# Patient Record
Sex: MALE | Race: WHITE | Employment: OTHER | URBAN - METROPOLITAN AREA
[De-identification: names, ages, dates, MRNs, and addresses within clinical notes are randomized per-mention and may not be internally consistent; named-entity substitution may affect disease eponyms.]

---

## 2017-03-08 PROBLEM — E89.0 POSTABLATIVE HYPOTHYROIDISM: Status: ACTIVE | Noted: 2017-03-08

## 2017-03-08 PROBLEM — I10 ESSENTIAL HYPERTENSION: Status: ACTIVE | Noted: 2017-03-08

## 2018-01-31 PROBLEM — E66.01 OBESITY, MORBID (HCC): Status: ACTIVE | Noted: 2018-01-31

## 2018-04-10 PROBLEM — E78.2 MIXED HYPERLIPIDEMIA: Status: ACTIVE | Noted: 2018-04-10

## 2018-08-21 PROBLEM — K52.9 GASTROENTERITIS: Status: ACTIVE | Noted: 2018-08-21

## 2019-05-09 PROBLEM — E66.01 OBESITY, MORBID (HCC): Status: RESOLVED | Noted: 2018-01-31 | Resolved: 2019-05-09

## 2019-08-29 PROBLEM — E66.01 SEVERE OBESITY (HCC): Status: ACTIVE | Noted: 2019-08-29

## 2020-01-20 ENCOUNTER — HOSPITAL ENCOUNTER (OUTPATIENT)
Dept: ULTRASOUND IMAGING | Age: 65
Discharge: HOME | End: 2020-01-20
Attending: FAMILY MEDICINE

## 2020-01-20 DIAGNOSIS — R10.9 RIGHT FLANK PAIN: ICD-10-CM

## 2020-02-08 ENCOUNTER — HOSPITAL ENCOUNTER (OUTPATIENT)
Dept: MRI IMAGING | Age: 65
Discharge: HOME | End: 2020-02-08
Attending: FAMILY MEDICINE
Payer: MEDICARE

## 2020-02-08 DIAGNOSIS — K86.9 PANCREATIC LESION: ICD-10-CM

## 2020-02-08 PROCEDURE — A9575 INJ GADOTERATE MEGLUMI 0.1ML: HCPCS | Performed by: FAMILY MEDICINE

## 2020-02-08 PROCEDURE — 74183 MRI ABD W/O CNTR FLWD CNTR: CPT

## 2020-02-08 PROCEDURE — 74011000258 HC RX REV CODE- 258: Performed by: FAMILY MEDICINE

## 2020-02-08 PROCEDURE — 74011250636 HC RX REV CODE- 250/636: Performed by: FAMILY MEDICINE

## 2020-02-08 RX ORDER — GADOTERATE MEGLUMINE 376.9 MG/ML
20 INJECTION INTRAVENOUS
Status: COMPLETED | OUTPATIENT
Start: 2020-02-08 | End: 2020-02-08

## 2020-02-08 RX ORDER — SODIUM CHLORIDE 0.9 % (FLUSH) 0.9 %
10 SYRINGE (ML) INJECTION
Status: COMPLETED | OUTPATIENT
Start: 2020-02-08 | End: 2020-02-08

## 2020-02-08 RX ADMIN — Medication 10 ML: at 09:53

## 2020-02-08 RX ADMIN — GADOTERATE MEGLUMINE 20 ML: 376.9 INJECTION INTRAVENOUS at 09:53

## 2020-02-08 RX ADMIN — SODIUM CHLORIDE 100 ML: 900 INJECTION, SOLUTION INTRAVENOUS at 09:53

## 2020-05-15 PROBLEM — K86.89 PANCREATIC MASS: Status: ACTIVE | Noted: 2020-05-15

## 2020-05-18 RX ORDER — CEFAZOLIN SODIUM/WATER 2 G/20 ML
2 SYRINGE (ML) INTRAVENOUS
Status: CANCELLED | OUTPATIENT
Start: 2020-05-18 | End: 2020-05-18

## 2020-05-27 ENCOUNTER — ANESTHESIA EVENT (OUTPATIENT)
Dept: SURGERY | Age: 65
End: 2020-05-27
Payer: MEDICARE

## 2020-05-28 ENCOUNTER — HOSPITAL ENCOUNTER (OUTPATIENT)
Age: 65
Setting detail: OUTPATIENT SURGERY
Discharge: HOME | End: 2020-05-28
Attending: UROLOGY | Admitting: UROLOGY
Payer: MEDICARE

## 2020-05-28 ENCOUNTER — ANESTHESIA (OUTPATIENT)
Dept: SURGERY | Age: 65
End: 2020-05-28
Payer: MEDICARE

## 2020-05-28 VITALS
DIASTOLIC BLOOD PRESSURE: 66 MMHG | WEIGHT: 276.6 LBS | RESPIRATION RATE: 14 BRPM | HEART RATE: 85 BPM | OXYGEN SATURATION: 99 % | HEIGHT: 70 IN | SYSTOLIC BLOOD PRESSURE: 126 MMHG | BODY MASS INDEX: 39.6 KG/M2 | TEMPERATURE: 97.9 F

## 2020-05-28 DIAGNOSIS — N20.0 LEFT RENAL STONE: Primary | ICD-10-CM

## 2020-05-28 LAB
ANION GAP SERPL CALC-SCNC: 7 MMOL/L (ref 7–16)
BUN SERPL-MCNC: 10 MG/DL (ref 8–23)
CALCIUM SERPL-MCNC: 8.1 MG/DL (ref 8.3–10.4)
CHLORIDE SERPL-SCNC: 109 MMOL/L (ref 98–107)
CO2 SERPL-SCNC: 27 MMOL/L (ref 21–32)
CREAT SERPL-MCNC: 0.77 MG/DL (ref 0.8–1.5)
ERYTHROCYTE [DISTWIDTH] IN BLOOD BY AUTOMATED COUNT: 19 % (ref 11.9–14.6)
GLUCOSE BLD STRIP.AUTO-MCNC: 139 MG/DL (ref 65–100)
GLUCOSE BLD STRIP.AUTO-MCNC: 174 MG/DL (ref 65–100)
GLUCOSE SERPL-MCNC: 165 MG/DL (ref 65–100)
HCT VFR BLD AUTO: 39.1 % (ref 41.1–50.3)
HGB BLD-MCNC: 11.7 G/DL (ref 13.6–17.2)
MCH RBC QN AUTO: 20 PG (ref 26.1–32.9)
MCHC RBC AUTO-ENTMCNC: 29.9 G/DL (ref 31.4–35)
MCV RBC AUTO: 66.7 FL (ref 79.6–97.8)
NRBC # BLD: 0 K/UL (ref 0–0.2)
PLATELET # BLD AUTO: 201 K/UL (ref 150–450)
PMV BLD AUTO: ABNORMAL FL (ref 9.4–12.3)
POTASSIUM SERPL-SCNC: 4.2 MMOL/L (ref 3.5–5.1)
RBC # BLD AUTO: 5.86 M/UL (ref 4.23–5.6)
SODIUM SERPL-SCNC: 143 MMOL/L (ref 136–145)
WBC # BLD AUTO: 8.5 K/UL (ref 4.3–11.1)

## 2020-05-28 PROCEDURE — 77030037088 HC TUBE ENDOTRACH ORAL NSL COVD-A: Performed by: NURSE ANESTHETIST, CERTIFIED REGISTERED

## 2020-05-28 PROCEDURE — 74011250636 HC RX REV CODE- 250/636: Performed by: NURSE ANESTHETIST, CERTIFIED REGISTERED

## 2020-05-28 PROCEDURE — 74011000250 HC RX REV CODE- 250: Performed by: NURSE ANESTHETIST, CERTIFIED REGISTERED

## 2020-05-28 PROCEDURE — 80048 BASIC METABOLIC PNL TOTAL CA: CPT

## 2020-05-28 PROCEDURE — 77030039425 HC BLD LARYNG TRULITE DISP TELE -A: Performed by: NURSE ANESTHETIST, CERTIFIED REGISTERED

## 2020-05-28 PROCEDURE — 74011250636 HC RX REV CODE- 250/636: Performed by: UROLOGY

## 2020-05-28 PROCEDURE — C2617 STENT, NON-COR, TEM W/O DEL: HCPCS | Performed by: UROLOGY

## 2020-05-28 PROCEDURE — 77030019908 HC STETH ESOPH SIMS -A: Performed by: NURSE ANESTHETIST, CERTIFIED REGISTERED

## 2020-05-28 PROCEDURE — C1894 INTRO/SHEATH, NON-LASER: HCPCS | Performed by: UROLOGY

## 2020-05-28 PROCEDURE — C1758 CATHETER, URETERAL: HCPCS | Performed by: UROLOGY

## 2020-05-28 PROCEDURE — 74011250637 HC RX REV CODE- 250/637: Performed by: ANESTHESIOLOGY

## 2020-05-28 PROCEDURE — 77030019927 HC TBNG IRR CYSTO BAXT -A: Performed by: UROLOGY

## 2020-05-28 PROCEDURE — 77030021678 HC GLIDESCP STAT DISP VERT -B: Performed by: NURSE ANESTHETIST, CERTIFIED REGISTERED

## 2020-05-28 PROCEDURE — 77030018832 HC SOL IRR H20 ICUM -A: Performed by: UROLOGY

## 2020-05-28 PROCEDURE — 76210000016 HC OR PH I REC 1 TO 1.5 HR: Performed by: UROLOGY

## 2020-05-28 PROCEDURE — 74011636320 HC RX REV CODE- 636/320: Performed by: UROLOGY

## 2020-05-28 PROCEDURE — 77030040361 HC SLV COMPR DVT MDII -B: Performed by: UROLOGY

## 2020-05-28 PROCEDURE — 85027 COMPLETE CBC AUTOMATED: CPT

## 2020-05-28 PROCEDURE — 76010000131 HC OR TIME 2 TO 2.5 HR: Performed by: UROLOGY

## 2020-05-28 PROCEDURE — 77030040922 HC BLNKT HYPOTHRM STRY -A: Performed by: NURSE ANESTHETIST, CERTIFIED REGISTERED

## 2020-05-28 PROCEDURE — 76210000020 HC REC RM PH II FIRST 0.5 HR: Performed by: UROLOGY

## 2020-05-28 PROCEDURE — 74011000250 HC RX REV CODE- 250: Performed by: ANESTHESIOLOGY

## 2020-05-28 PROCEDURE — 77030005518 HC CATH URETH FOL 2W BARD -B: Performed by: UROLOGY

## 2020-05-28 PROCEDURE — 76060000035 HC ANESTHESIA 2 TO 2.5 HR: Performed by: UROLOGY

## 2020-05-28 PROCEDURE — 74011250636 HC RX REV CODE- 250/636: Performed by: ANESTHESIOLOGY

## 2020-05-28 PROCEDURE — C1769 GUIDE WIRE: HCPCS | Performed by: UROLOGY

## 2020-05-28 PROCEDURE — 82962 GLUCOSE BLOOD TEST: CPT

## 2020-05-28 PROCEDURE — 77030035011 HC LSR FBR HOLM FLXIVA TRAC TIP BSC -F: Performed by: UROLOGY

## 2020-05-28 DEVICE — URETERAL STENT
Type: IMPLANTABLE DEVICE | Site: URETER | Status: FUNCTIONAL
Brand: PERCUFLEX™ PLUS

## 2020-05-28 RX ORDER — OXYCODONE HYDROCHLORIDE 5 MG/1
10 TABLET ORAL
Status: DISCONTINUED | OUTPATIENT
Start: 2020-05-28 | End: 2020-05-28 | Stop reason: HOSPADM

## 2020-05-28 RX ORDER — SODIUM CHLORIDE, SODIUM LACTATE, POTASSIUM CHLORIDE, CALCIUM CHLORIDE 600; 310; 30; 20 MG/100ML; MG/100ML; MG/100ML; MG/100ML
75 INJECTION, SOLUTION INTRAVENOUS CONTINUOUS
Status: DISCONTINUED | OUTPATIENT
Start: 2020-05-28 | End: 2020-05-28 | Stop reason: HOSPADM

## 2020-05-28 RX ORDER — ROCURONIUM BROMIDE 10 MG/ML
INJECTION, SOLUTION INTRAVENOUS AS NEEDED
Status: DISCONTINUED | OUTPATIENT
Start: 2020-05-28 | End: 2020-05-28 | Stop reason: HOSPADM

## 2020-05-28 RX ORDER — EPHEDRINE SULFATE/0.9% NACL/PF 50 MG/5 ML
SYRINGE (ML) INTRAVENOUS AS NEEDED
Status: DISCONTINUED | OUTPATIENT
Start: 2020-05-28 | End: 2020-05-28 | Stop reason: HOSPADM

## 2020-05-28 RX ORDER — LIDOCAINE HYDROCHLORIDE 10 MG/ML
0.1 INJECTION INFILTRATION; PERINEURAL AS NEEDED
Status: DISCONTINUED | OUTPATIENT
Start: 2020-05-28 | End: 2020-05-28 | Stop reason: HOSPADM

## 2020-05-28 RX ORDER — ONDANSETRON 2 MG/ML
INJECTION INTRAMUSCULAR; INTRAVENOUS AS NEEDED
Status: DISCONTINUED | OUTPATIENT
Start: 2020-05-28 | End: 2020-05-28 | Stop reason: HOSPADM

## 2020-05-28 RX ORDER — SUCCINYLCHOLINE CHLORIDE 20 MG/ML
INJECTION INTRAMUSCULAR; INTRAVENOUS AS NEEDED
Status: DISCONTINUED | OUTPATIENT
Start: 2020-05-28 | End: 2020-05-28 | Stop reason: HOSPADM

## 2020-05-28 RX ORDER — MIDAZOLAM HYDROCHLORIDE 1 MG/ML
2 INJECTION, SOLUTION INTRAMUSCULAR; INTRAVENOUS
Status: DISCONTINUED | OUTPATIENT
Start: 2020-05-28 | End: 2020-05-28 | Stop reason: HOSPADM

## 2020-05-28 RX ORDER — DIPHENHYDRAMINE HYDROCHLORIDE 50 MG/ML
12.5 INJECTION, SOLUTION INTRAMUSCULAR; INTRAVENOUS
Status: DISCONTINUED | OUTPATIENT
Start: 2020-05-28 | End: 2020-05-28 | Stop reason: HOSPADM

## 2020-05-28 RX ORDER — SODIUM CHLORIDE, SODIUM LACTATE, POTASSIUM CHLORIDE, CALCIUM CHLORIDE 600; 310; 30; 20 MG/100ML; MG/100ML; MG/100ML; MG/100ML
INJECTION, SOLUTION INTRAVENOUS
Status: DISCONTINUED | OUTPATIENT
Start: 2020-05-28 | End: 2020-05-28 | Stop reason: HOSPADM

## 2020-05-28 RX ORDER — PROPOFOL 10 MG/ML
INJECTION, EMULSION INTRAVENOUS AS NEEDED
Status: DISCONTINUED | OUTPATIENT
Start: 2020-05-28 | End: 2020-05-28 | Stop reason: HOSPADM

## 2020-05-28 RX ORDER — NALOXONE HYDROCHLORIDE 0.4 MG/ML
0.1 INJECTION, SOLUTION INTRAMUSCULAR; INTRAVENOUS; SUBCUTANEOUS
Status: DISCONTINUED | OUTPATIENT
Start: 2020-05-28 | End: 2020-05-28 | Stop reason: HOSPADM

## 2020-05-28 RX ORDER — LIDOCAINE HYDROCHLORIDE 20 MG/ML
INJECTION, SOLUTION EPIDURAL; INFILTRATION; INTRACAUDAL; PERINEURAL AS NEEDED
Status: DISCONTINUED | OUTPATIENT
Start: 2020-05-28 | End: 2020-05-28 | Stop reason: HOSPADM

## 2020-05-28 RX ORDER — ACETAMINOPHEN 500 MG
1000 TABLET ORAL ONCE
Status: COMPLETED | OUTPATIENT
Start: 2020-05-28 | End: 2020-05-28

## 2020-05-28 RX ORDER — DEXAMETHASONE SODIUM PHOSPHATE 4 MG/ML
INJECTION, SOLUTION INTRA-ARTICULAR; INTRALESIONAL; INTRAMUSCULAR; INTRAVENOUS; SOFT TISSUE AS NEEDED
Status: DISCONTINUED | OUTPATIENT
Start: 2020-05-28 | End: 2020-05-28 | Stop reason: HOSPADM

## 2020-05-28 RX ORDER — GLYCOPYRROLATE 0.2 MG/ML
INJECTION INTRAMUSCULAR; INTRAVENOUS AS NEEDED
Status: DISCONTINUED | OUTPATIENT
Start: 2020-05-28 | End: 2020-05-28 | Stop reason: HOSPADM

## 2020-05-28 RX ORDER — HYDROCODONE BITARTRATE AND ACETAMINOPHEN 5; 325 MG/1; MG/1
1 TABLET ORAL
Qty: 20 TAB | Refills: 0 | Status: SHIPPED | OUTPATIENT
Start: 2020-05-28 | End: 2020-06-04

## 2020-05-28 RX ORDER — HYDROMORPHONE HYDROCHLORIDE 2 MG/ML
0.5 INJECTION, SOLUTION INTRAMUSCULAR; INTRAVENOUS; SUBCUTANEOUS
Status: DISCONTINUED | OUTPATIENT
Start: 2020-05-28 | End: 2020-05-28 | Stop reason: HOSPADM

## 2020-05-28 RX ORDER — FENTANYL CITRATE 50 UG/ML
INJECTION, SOLUTION INTRAMUSCULAR; INTRAVENOUS AS NEEDED
Status: DISCONTINUED | OUTPATIENT
Start: 2020-05-28 | End: 2020-05-28 | Stop reason: HOSPADM

## 2020-05-28 RX ORDER — KETOROLAC TROMETHAMINE 30 MG/ML
INJECTION, SOLUTION INTRAMUSCULAR; INTRAVENOUS AS NEEDED
Status: DISCONTINUED | OUTPATIENT
Start: 2020-05-28 | End: 2020-05-28 | Stop reason: HOSPADM

## 2020-05-28 RX ORDER — OXYCODONE HYDROCHLORIDE 5 MG/1
5 TABLET ORAL
Status: DISCONTINUED | OUTPATIENT
Start: 2020-05-28 | End: 2020-05-28 | Stop reason: HOSPADM

## 2020-05-28 RX ORDER — FLUMAZENIL 0.1 MG/ML
0.2 INJECTION INTRAVENOUS AS NEEDED
Status: DISCONTINUED | OUTPATIENT
Start: 2020-05-28 | End: 2020-05-28 | Stop reason: HOSPADM

## 2020-05-28 RX ORDER — NEOSTIGMINE METHYLSULFATE 1 MG/ML
INJECTION, SOLUTION INTRAVENOUS AS NEEDED
Status: DISCONTINUED | OUTPATIENT
Start: 2020-05-28 | End: 2020-05-28 | Stop reason: HOSPADM

## 2020-05-28 RX ADMIN — PROPOFOL 20 MG: 10 INJECTION, EMULSION INTRAVENOUS at 13:36

## 2020-05-28 RX ADMIN — PHENYLEPHRINE HYDROCHLORIDE 60 MCG: 10 INJECTION INTRAVENOUS at 13:19

## 2020-05-28 RX ADMIN — PROMETHAZINE HYDROCHLORIDE 3.12 MG: 25 INJECTION INTRAMUSCULAR; INTRAVENOUS at 15:59

## 2020-05-28 RX ADMIN — FENTANYL CITRATE 25 MCG: 50 INJECTION INTRAMUSCULAR; INTRAVENOUS at 12:54

## 2020-05-28 RX ADMIN — GLYCOPYRROLATE 0.2 MG: 0.2 INJECTION, SOLUTION INTRAMUSCULAR; INTRAVENOUS at 14:54

## 2020-05-28 RX ADMIN — ONDANSETRON 4 MG: 2 INJECTION INTRAMUSCULAR; INTRAVENOUS at 13:04

## 2020-05-28 RX ADMIN — SUCCINYLCHOLINE CHLORIDE 180 MG: 20 INJECTION, SOLUTION INTRAMUSCULAR; INTRAVENOUS at 12:58

## 2020-05-28 RX ADMIN — PROPOFOL 10 MG: 10 INJECTION, EMULSION INTRAVENOUS at 13:56

## 2020-05-28 RX ADMIN — PROPOFOL 50 MG: 10 INJECTION, EMULSION INTRAVENOUS at 13:30

## 2020-05-28 RX ADMIN — PHENYLEPHRINE HYDROCHLORIDE 60 MCG: 10 INJECTION INTRAVENOUS at 13:08

## 2020-05-28 RX ADMIN — PROPOFOL 10 MG: 10 INJECTION, EMULSION INTRAVENOUS at 13:39

## 2020-05-28 RX ADMIN — Medication 5 MG: at 13:11

## 2020-05-28 RX ADMIN — GLYCOPYRROLATE 0.1 MG: 0.2 INJECTION, SOLUTION INTRAMUSCULAR; INTRAVENOUS at 13:19

## 2020-05-28 RX ADMIN — PHENYLEPHRINE HYDROCHLORIDE 60 MCG: 10 INJECTION INTRAVENOUS at 13:10

## 2020-05-28 RX ADMIN — PHENYLEPHRINE HYDROCHLORIDE 60 MCG: 10 INJECTION INTRAVENOUS at 13:17

## 2020-05-28 RX ADMIN — PROPOFOL 200 MG: 10 INJECTION, EMULSION INTRAVENOUS at 12:57

## 2020-05-28 RX ADMIN — PHENYLEPHRINE HYDROCHLORIDE 60 MCG: 10 INJECTION INTRAVENOUS at 13:13

## 2020-05-28 RX ADMIN — DEXAMETHASONE SODIUM PHOSPHATE 4 MG: 4 INJECTION, SOLUTION INTRAMUSCULAR; INTRAVENOUS at 13:04

## 2020-05-28 RX ADMIN — FENTANYL CITRATE 50 MCG: 50 INJECTION INTRAMUSCULAR; INTRAVENOUS at 12:57

## 2020-05-28 RX ADMIN — PROPOFOL 10 MG: 10 INJECTION, EMULSION INTRAVENOUS at 13:58

## 2020-05-28 RX ADMIN — KETOROLAC TROMETHAMINE 15 MG: 30 INJECTION, SOLUTION INTRAMUSCULAR; INTRAVENOUS at 14:18

## 2020-05-28 RX ADMIN — PHENYLEPHRINE HYDROCHLORIDE 60 MCG: 10 INJECTION INTRAVENOUS at 13:43

## 2020-05-28 RX ADMIN — CEFAZOLIN 1 G: 1 INJECTION, POWDER, FOR SOLUTION INTRAVENOUS at 13:02

## 2020-05-28 RX ADMIN — PROPOFOL 10 MG: 10 INJECTION, EMULSION INTRAVENOUS at 14:21

## 2020-05-28 RX ADMIN — FENTANYL CITRATE 25 MCG: 50 INJECTION INTRAMUSCULAR; INTRAVENOUS at 13:30

## 2020-05-28 RX ADMIN — GLYCOPYRROLATE 0.1 MG: 0.2 INJECTION, SOLUTION INTRAMUSCULAR; INTRAVENOUS at 13:11

## 2020-05-28 RX ADMIN — CEFAZOLIN 1 G: 1 INJECTION, POWDER, FOR SOLUTION INTRAVENOUS at 13:04

## 2020-05-28 RX ADMIN — ROCURONIUM BROMIDE 10 MG: 10 INJECTION, SOLUTION INTRAVENOUS at 12:57

## 2020-05-28 RX ADMIN — Medication 1 MG: at 14:54

## 2020-05-28 RX ADMIN — PROPOFOL 10 MG: 10 INJECTION, EMULSION INTRAVENOUS at 14:23

## 2020-05-28 RX ADMIN — ACETAMINOPHEN 1000 MG: 500 TABLET, FILM COATED ORAL at 11:36

## 2020-05-28 RX ADMIN — FENTANYL CITRATE 25 MCG: 50 INJECTION INTRAMUSCULAR; INTRAVENOUS at 14:16

## 2020-05-28 RX ADMIN — PROPOFOL 20 MG: 10 INJECTION, EMULSION INTRAVENOUS at 13:33

## 2020-05-28 RX ADMIN — PROPOFOL 50 MG: 10 INJECTION, EMULSION INTRAVENOUS at 13:02

## 2020-05-28 RX ADMIN — SODIUM CHLORIDE, SODIUM LACTATE, POTASSIUM CHLORIDE, AND CALCIUM CHLORIDE: 600; 310; 30; 20 INJECTION, SOLUTION INTRAVENOUS at 12:51

## 2020-05-28 RX ADMIN — PROPOFOL 10 MG: 10 INJECTION, EMULSION INTRAVENOUS at 14:19

## 2020-05-28 RX ADMIN — PROPOFOL 50 MG: 10 INJECTION, EMULSION INTRAVENOUS at 13:05

## 2020-05-28 RX ADMIN — FENTANYL CITRATE 50 MCG: 50 INJECTION INTRAMUSCULAR; INTRAVENOUS at 13:04

## 2020-05-28 RX ADMIN — SODIUM CHLORIDE, SODIUM LACTATE, POTASSIUM CHLORIDE, AND CALCIUM CHLORIDE: 600; 310; 30; 20 INJECTION, SOLUTION INTRAVENOUS at 14:07

## 2020-05-28 RX ADMIN — Medication 1 MG: at 14:52

## 2020-05-28 RX ADMIN — SODIUM CHLORIDE, SODIUM LACTATE, POTASSIUM CHLORIDE, AND CALCIUM CHLORIDE 75 ML/HR: 600; 310; 30; 20 INJECTION, SOLUTION INTRAVENOUS at 11:15

## 2020-05-28 RX ADMIN — PHENYLEPHRINE HYDROCHLORIDE 60 MCG: 10 INJECTION INTRAVENOUS at 13:07

## 2020-05-28 RX ADMIN — CEFAZOLIN 1 G: 1 INJECTION, POWDER, FOR SOLUTION INTRAVENOUS at 13:00

## 2020-05-28 RX ADMIN — LIDOCAINE HYDROCHLORIDE 100 MG: 20 INJECTION, SOLUTION EPIDURAL; INFILTRATION; INTRACAUDAL; PERINEURAL at 12:57

## 2020-05-28 RX ADMIN — PROPOFOL 10 MG: 10 INJECTION, EMULSION INTRAVENOUS at 14:25

## 2020-05-28 RX ADMIN — GLYCOPYRROLATE 0.2 MG: 0.2 INJECTION, SOLUTION INTRAMUSCULAR; INTRAVENOUS at 14:52

## 2020-05-28 RX ADMIN — FENTANYL CITRATE 25 MCG: 50 INJECTION INTRAMUSCULAR; INTRAVENOUS at 12:52

## 2020-05-28 NOTE — BRIEF OP NOTE
Brief Postoperative Note    Patient: Manolo Reed  YOB: 1955  MRN: [id number]    Date of Procedure: 5/28/2020     Pre-Op Diagnosis: Kidney mass [N28.89]    Post-Op Diagnosis: Same as preoperative diagnosis. Urethral stricture, left renal stone. Procedure(s):  CYSTOSCOPY/ LEFT RETROGRADE PYELOGRAM/ LEFT URETEROSCOPY W/ LEFT URETERAL BIOPSY AND LEFT STENT    Surgeon(s):  Billy Nguyen MD    Surgical Assistant: None    Anesthesia: General     Estimated Blood Loss (mL): Minimal    Complications: None    Specimens: * No specimens in log *     Implants:   Implant Name Type Inv.  Item Serial No.  Lot No. LRB No. Used Action   STENT URET 7F 26CM -- [address] [place] [address] [id number] - [id number]  STENT URET 7F 26CM -- [address] [place] [address] [id number]  [facility] [id number] Left 1 Implanted       Drains: * No LDAs found *    Findings: see op note    Electronically Signed by Santos Sawyer MD on 5/28/2020 at 2:51 PM

## 2020-05-28 NOTE — ANESTHESIA POSTPROCEDURE EVALUATION
Procedure(s):  CYSTOSCOPY/ LEFT RETROGRADE PYELOGRAM/ LEFT URETEROSCOPY W/ LEFT URETERAL BIOPSY AND LEFT STENT/.    general    Anesthesia Post Evaluation      Multimodal analgesia: multimodal analgesia used between 6 hours prior to anesthesia start to PACU discharge  Patient location during evaluation: PACU  Patient participation: complete - patient participated  Level of consciousness: awake and alert  Pain management: adequate  Airway patency: patent  Anesthetic complications: no  Cardiovascular status: acceptable and hemodynamically stable  Respiratory status: acceptable  Hydration status: acceptable        INITIAL Post-op Vital signs:   Vitals Value Taken Time   /66 5/28/2020  4:01 PM   Temp 36.6 °C (97.9 °F) 5/28/2020  3:11 PM   Pulse 89 5/28/2020  4:04 PM   Resp 16 5/28/2020  4:01 PM   spo2 98% 5/28/2020  4:04 PM   Vitals shown include unvalidated device data.

## 2020-05-28 NOTE — DISCHARGE INSTRUCTIONS
Patient Education        Cystoscopy: What to Expect at [facility]     A cystoscopy is a procedure that lets a doctor look inside of the bladder and the urethra. The urethra is the tube that carries urine from the bladder to outside the body. The doctor uses a thin, lighted tool called a cystoscope. Your bladder is filled with fluid. This stretches the bladder so that your doctor can look closely at the inside of your bladder. After the cystoscopy, your urethra may be sore at first, and it may burn when you urinate for the first few days after the procedure. You may feel the need to urinate more often, and your urine may be pink. These symptoms should get better in 1 or 2 days. You will probably be able to go back to most of your usual activities in 1 or 2 days. This care sheet gives you a general idea about how long it will take for you to recover. But each person recovers at a different pace. Follow the steps below to get better as quickly as possible. How can you care for yourself at home? Activity  · Rest when you feel tired. Getting enough sleep will help you recover. · Try to walk each day. Start by walking a little more than you did the day before. Bit by bit, increase the amount you walk. Walking boosts blood flow and helps prevent pneumonia and constipation. · Avoid strenuous activities, such as bicycle riding, jogging, weight lifting, or aerobic exercise, until your doctor says it is okay. · Ask your doctor when you can drive again. · Most people are able to return to work within 1 or 2 days after the procedure. · You may shower and take baths as usual.  · Ask your doctor when it is okay for you to have sex. Diet  · You can eat your normal diet. If your stomach is upset, try bland, low-fat foods like plain rice, broiled chicken, toast, and yogurt. · Drink plenty of fluids (unless your doctor tells you not to). Medicines  · Take pain medicines exactly as directed.   ? If the doctor gave you a prescription medicine for pain, take it as prescribed. ? If you are not taking a prescription pain medicine, ask your doctor if you can take an over-the-counter medicine. · If you think your pain medicine is making you sick to your stomach:  ? Take your medicine after meals (unless your doctor has told you not to). ? Ask your doctor for a different pain medicine. · If your doctor prescribed antibiotics, take them as directed. Do not stop taking them just because you feel better. You need to take the full course of antibiotics. Follow-up care is a key part of your treatment and safety. Be sure to make and go to all appointments, and call your doctor if you are having problems. It's also a good idea to know your test results and keep a list of the medicines you take. When should you call for help? [id number] anytime you think you may need emergency care. For example, call if:  · You passed out (lost consciousness). · You have severe trouble breathing. · You have sudden chest pain and shortness of breath, or you cough up blood. · You have severe belly pain. Call your doctor now or seek immediate medical care if:  · You are sick to your stomach or cannot keep fluids down. · Your urine is still red or you see blood clots after you have urinated several times. · You have trouble passing urine or stool, especially if you have pain or swelling in your lower belly. · You have signs of a blood clot, such as:  ? Pain in your calf, back of the knee, thigh, or groin. ? Redness and swelling in your leg or groin. · You develop a fever or severe chills. · You have pain in your back just below your rib cage. This is called flank pain. Watch closely for changes in your health, and be sure to contact your doctor if:  · You have pain or burning when you urinate. A burning feeling is normal for a day or two after the test, but call if it does not get better.   · You have a frequent urge to urinate but can pass only small amounts of urine. · Your urine is pink, red, or cloudy, or smells bad. It is normal for the urine to have a pinkish color for a few days after the test, but call if it does not get better. Where can you learn more? Go to http://mendoza-dwain.info/  Enter C842 in the search box to learn more about \"Cystoscopy: What to Expect at Home. \"  Current as of: August 22, 2019               Content Version: 12.5  © 9265-4036 [employer]. Care instructions adapted under license by [employer] (which disclaims liability or warranty for this information). If you have questions about a medical condition or this instruction, always ask your healthcare professional. [place] [address] any warranty or liability for your use of this information. TYPICAL SIDE EFFECTS OF PAIN MEDICATION:  *    Constipation: Drink lots of fluids. Over the counter stool softener if needed. *    Nausea: Take pain medication with food. Call your doctor with persistent nausea. ACTIVITY  · As tolerated and as directed by your doctor. · Bathe or shower as directed by your doctor. DIET  · Day of surgery: Clear liquids until no nausea or vomiting; small portion, light diet [place] foods (ex: baked chicken, plain rice, grits, scrambled eggs, toast). Nothing greasy, fried or spicy today. · Advance to regular diet on second day, unless your doctor orders otherwise. · If nausea and vomiting continues, call your doctor. PAIN  · Take pain medication as directed by your doctor. · DO NOT take aspirin or blood thinners unless directed by your doctor.        CALL YOUR DOCTOR IF    s Call your doctor if pain is NOT relieved by medication.   s Excessive bleeding that does not stop after holding pressure over the area  · Temperature of 101 degrees F or above  · Excessive redness, swelling or bruising, and/ or green or yellow, smelly discharge from incision    AFTER ANESTHESIA · For the first 24 hours: DO NOT Drive, Drink alcoholic beverages, or Make important decisions. · Be aware of dizziness following anesthesia and while taking pain medication. DISCHARGE SUMMARY from Nurse    PATIENT INSTRUCTIONS:    After general anesthesia or intravenous sedation, for 24 hours or while taking prescription Narcotics:  · Limit your activities  · Do not drive and operate hazardous machinery  · Do not make important personal or business decisions  · Do  not drink alcoholic beverages  · If you have not urinated within 8 hours after discharge, please contact your surgeon on call. *  Please give a list of your current medications to your Primary Care Provider. *  Please update this list whenever your medications are discontinued, doses are      changed, or new medications (including over-the-counter products) are added. *  Please carry medication information at all times in case of emergency situations. Preventing Infection at Home  We care about preventing infection and avoiding the spread of germs - not only when you are in the hospital but also when you return home. When you return home from the hospital, its important to take the following steps to help prevent infection and avoid spreading germs that could infect you and others. Ask everyone in your home to follow these guidelines, too. Clean Your Hands  · Clean your hands whenever your hands are visibly dirty, before you eat, before or after touching your mouth, nose or eyes, and before preparing food. Clean them after contact with body fluids, using the restroom, touching animals or changing diapers. · When washing hands, wet them with warm water and work up a lather. Rub hands for at least 15 seconds, then rinse them and pat them dry with a clean towel or paper towel. · When using hand sanitizers, it should take about 15 seconds to rub your hands dry. If not, you probably didnt apply enough .     Cover Your Sneeze or Cough  Germs are released into the air whenever you sneeze or cough. To prevent the spread of infection:  · Turn away from other people before coughing or sneezing. · Cover your mouth or nose with a tissue when you cough or sneeze. Put the tissue in the trash. · If you dont have a tissue, cough or sneeze into your upper sleeve, not your hands. · Always clean your hands after coughing or sneezing. Care for Wounds  Your skin is your bodys first line of defense against germs, but an open wound leaves an easy way for germs to enter your body. To prevent infection:  · Clean your hands before and after changing wound dressings, and wear gloves to change dressings if recommended by your doctor. · Take special care with IV lines or other devices inserted into the body. If you must touch them, clean your hands first.  · Follow any specific instructions from your doctor to care for your wounds. Contact your doctor if you experience any signs of infection, such as fever or increased redness at the surgical or wound site. Keep a Clean Home  · Clean or wipe commonly touched hard surfaces like door handles, sinks, tabletops, phones and TV remotes. · Use products labeled disinfectant to kill harmful bacteria and viruses. · Use a clean cloth or paper towel to clean and dry surfaces. Wiping surfaces with a dirty dishcloth, sponge or towel will only spread germs. · Never share toothbrushes, mcdermott, drinking glasses, utensils, razor blades, face cloths or bath towels to avoid spreading germs. · Be sure that the linens that you sleep on are clean. · Keep pets away from wounds and wash your hands after touching pets, their toys or bedding. We care about you and your health. Remember, preventing infections is a team effort between you, your family, friends and health care providers.        These are general instructions for a healthy lifestyle:    No smoking/ No tobacco products/ Avoid exposure to second hand smoke    Surgeon General's Warning:  Quitting smoking now greatly reduces serious risk to your health. Obesity, smoking, and sedentary lifestyle greatly increases your risk for illness    A healthy diet, regular physical exercise & weight monitoring are important for maintaining a healthy lifestyle    You may be retaining fluid if you have a history of heart failure or if you experience any of the following symptoms:  Weight gain of 3 pounds or more overnight or 5 pounds in a week, increased swelling in our hands or feet or shortness of breath while lying flat in bed. Please call your doctor as soon as you notice any of these symptoms; do not wait until your next office visit. Recognize signs and symptoms of STROKE:    F-face looks uneven    A-arms unable to move or move unevenly    S-speech slurred or non-existent    T-time-call 911 as soon as signs and symptoms begin-DO NOT go       Back to bed or wait to see if you get better-TIME IS BRAIN. Advance Care Planning  People with COVID-19 may have no symptoms, mild symptoms, such as fever, cough, and shortness of breath or they may have more severe illness, developing severe and fatal pneumonia. As a result, Advance Care Planning with attention to naming a health care decision maker (someone you trust to make healthcare decisions for you if you could not speak for yourself) and sharing other health care preferences is important BEFORE a possible health crisis. Please contact your Primary Care Provider to discuss Advance Care Planning.      Preventing the Spread of Coronavirus Disease 2019 in Homes and Residential Communities  For the most recent information go to [employer].fi    Prevention steps for People with confirmed or suspected COVID-19 (including persons under investigation) who do not need to be hospitalized  and   People with confirmed COVID-19 who were hospitalized and determined to be medically stable to go home    Your healthcare provider and public health staff will evaluate whether you can be cared for at home. If it is determined that you do not need to be hospitalized and can be isolated at home, you will be monitored by staff from your local or state health department. You should follow the prevention steps below until a healthcare provider or local or state health department says you can return to your normal activities. Stay home except to get medical care  People who are mildly ill with COVID-19 are able to isolate at home during their illness. You should restrict activities outside your home, except for getting medical care. Do not go to work, school, or public areas. Avoid using public transportation, ride-sharing, or taxis. Separate yourself from other people and animals in your home  People: As much as possible, you should stay in a specific room and away from other people in your home. Also, you should use a separate bathroom, if available. Animals: You should restrict contact with pets and other animals while you are sick with COVID-19, just like you would around other people. Although there have not been reports of pets or other animals becoming sick with COVID-19, it is still recommended that people sick with COVID-19 limit contact with animals until more information is known about the virus. When possible, have another member of your household care for your animals while you are sick. If you are sick with COVID-19, avoid contact with your pet, including petting, snuggling, being kissed or licked, and sharing food. If you must care for your pet or be around animals while you are sick, wash your hands before and after you interact with pets and wear a facemask. Call ahead before visiting your doctor  If you have a medical appointment, call the healthcare provider and tell them that you have or may have COVID-19.  This will help the healthcare providers office take steps to keep other people from getting infected or exposed. Wear a facemask  You should wear a facemask when you are around other people (e.g., sharing a room or vehicle) or pets and before you enter a healthcare providers office. If you are not able to wear a facemask (for example, because it causes trouble breathing), then people who live with you should not stay in the same room with you, or they should wear a facemask if they enter your room. Cover your coughs and sneezes  Cover your mouth and nose with a tissue when you cough or sneeze. Throw used tissues in a lined trash can. Immediately wash your hands with soap and water for at least 20 seconds or, if soap and water are not available, clean your hands with an alcohol-based hand  that contains at least 60% alcohol. Clean your hands often  Wash your hands often with soap and water for at least 20 seconds, especially after blowing your nose, coughing, or sneezing; going to the bathroom; and before eating or preparing food. If soap and water are not readily available, use an alcohol-based hand  with at least 60% alcohol, covering all surfaces of your hands and rubbing them together until they feel dry. Soap and water are the best option if hands are visibly dirty. Avoid touching your eyes, nose, and mouth with unwashed hands. Avoid sharing personal household items  You should not share dishes, drinking glasses, cups, eating utensils, towels, or bedding with other people or pets in your home. After using these items, they should be washed thoroughly with soap and water. Clean all high-touch surfaces everyday  High touch surfaces include counters, tabletops, doorknobs, bathroom fixtures, toilets, phones, keyboards, tablets, and bedside tables. Also, clean any surfaces that may have blood, stool, or body fluids on them. Use a household cleaning spray or wipe, according to the label instructions.  Labels contain instructions for safe and effective use of the cleaning product including precautions you should take when applying the product, such as wearing gloves and making sure you have good ventilation during use of the product. Monitor your symptoms  Seek prompt medical attention if your illness is worsening (e.g., difficulty breathing). Before seeking care, call your healthcare provider and tell them that you have, or are being evaluated for, COVID-19. Put on a facemask before you enter the facility. These steps will help the healthcare providers office to keep other people in the office or waiting room from getting infected or exposed. Ask your healthcare provider to call the local or state health department. Persons who are placed under active monitoring or facilitated self-monitoring should follow instructions provided by their local health department or occupational health professionals, as appropriate. When working with your local health department check their available hours. If you have a medical emergency and need to call 911, notify the dispatch personnel that you have, or are being evaluated for COVID-19. If possible, put on a facemask before emergency medical services arrive. Discontinuing home isolation  Patients with confirmed COVID-19 should remain under home isolation precautions until the risk of secondary transmission to others is thought to be low. The decision to discontinue home isolation precautions should be made on a case-by-case basis, in consultation with healthcare providers and state and local health departments.

## 2020-05-28 NOTE — ANESTHESIA PREPROCEDURE EVALUATION
Relevant Problems   No relevant active problems       Anesthetic History   No history of anesthetic complications            Review of Systems / Medical History  Patient summary reviewed and pertinent labs reviewed    Pulmonary        Sleep apnea (s/p UPPP)           Neuro/Psych   Within defined limits           Cardiovascular    Hypertension: well controlled              Exercise tolerance: >4 METS     GI/Hepatic/Renal         Renal disease (Renal mass)      Comments: Cystic pancreatic mass Endo/Other    Diabetes: poorly controlled, type 2, using insulin  Hypothyroidism: well controlled  Obesity and arthritis     Other Findings              Physical Exam    Airway  Mallampati: II  TM Distance: > 6 cm  Neck ROM: normal range of motion   Mouth opening: Normal     Cardiovascular    Rhythm: regular  Rate: normal         Dental  No notable dental hx       Pulmonary  Breath sounds clear to auscultation               Abdominal         Other Findings            Anesthetic Plan    ASA: 3  Anesthesia type: general            Anesthetic plan and risks discussed with: Patient

## 2020-05-29 NOTE — OP NOTES
[address] [place] [address]  OPERATIVE REPORT    Name:  Jerry Dorantes  MR#:  [id number]  :  1955  ACCOUNT #:  [de-identified]  DATE OF SERVICE:  2020    PREOPERATIVE DIAGNOSES:  1. Left renal mass. 2.  Hematuria. POSTOPERATIVE DIAGNOSES:  1. Left renal mass. 2.  Left renal calculus. 3.  Urethral stricture. PROCEDURES PERFORMED:  Cystoscopy, urethral dilation with Amplatz dilators, left retrograde pyelogram, left ureteropyeloscopy with laser lithotripsy of renal stone and placement of left double-J ureteral stent. SURGEON:  Mark Talley MD    ASSISTANT:  None. ANESTHESIA:  General.    COMPLICATIONS:  None. SPECIMENS REMOVED:  None. IMPLANTS:  Left ureteral stent. ESTIMATED BLOOD LOSS:  none. FINDINGS:  Tight bulbar urethral stricture, which was dilated with Amplatz dilators. A left retrograde pyelogram shows possible mass effect in the mid kidney with a 15 mm radiolucent filling defect in the upper dominik. Direct vision showed this to be a stone. DESCRIPTION OF PROCEDURE:   The patient was given a general anesthetic, placed in the dorsal supine position. He was prepped and draped in sterile fashion. Fluoroscopy was performed. It did not show any stones. I passed a 22-Dutch cystoscope into the urethra using video camera and 30-degree lens. There is a tight stricture of the bulbar urethra through which I could not pass the scope. Using fluoroscopic guidance a floppy guidewire was passed into the bladder. I then dilated the stricture with Amplatz dilators beginning at 12-Dutch and going up to 24-Dutch. The wire was left in place and the scope was passed. The stricture is well dilated. There was moderate obstructing BPH. The scope was passed into the bladder. There is no evidence of any bladder trauma. There are no bladder tumors.     Both ureteral orifices appeared normal.  A cone-tipped catheter was used to perform a left retrograde pyelogram.    INTERPRETATION OF PYELOGRAM:  Contrast was injected via a cone-tipped catheter. The left ureter appears normal.  There is a bifid type collecting system of the left renal pelvis with some evidence of mass effect in the mid kidney. There definitely is a 15 mm filling defect in the superior dominik, which appears to be radiolucent. Also, questionable filling defects in the inferior calyces. Guidewire was passed up into the upper dominik. I attempted to pass a 6.5 Northern Irish semi-rigid ureteroscope, but it would not pass up into the upper portion of the kidney. I then passed another guidewire through the scope leaving two wires in place. The 11/13 Northern Irish coaxial ureteral access sheath was passed in a twisting motion and left in the superior infundibulum. I then passed a P6 flexible ureteroscope through the sheath. I was able to pass it up into the upper dominik. The previously noted filling defect appears to be a stone, which is probably radiolucent. I did not see any urothelial tumors. A 20 micron holmium laser fiber was used to fragment a portion of the stone. We began a dusting setting of 0.3 joules and 50 Hz. I gradually increased up to 0.5 joules to break up some of the fragments. Because the patient has likely renal cancer which is likely going to require nephrectomy, we elected not to totally remove the stones. At this point one wire was left in place. I reintroduced the inner portion of the access sheath and passed a guidewire, removed access sheath carefully in a twisting motion leaving one guidewire in place going into the upper dominik. I passed a 7-Northern Irish 26-cm long double-J stent over this wire and was left in good position with suture attached to the stent and left in the patient's urethra. I then left the wire in the bladder and passed a 24-Northern Irish Pokagon catheter over that wire with 10 mL of water in the balloon. It was left indwelling.     PLAN:  He is to be discharged home.  He will have his catheter removed next week. We will discuss treatment of his renal mass at that time.         MD ANAT [id number]  D:  05/28/2020 15:00  T:  05/29/2020 0:30  JOB #:  [id number]

## 2020-06-23 ENCOUNTER — ANESTHESIA (OUTPATIENT)
Dept: ENDOSCOPY | Age: 65
End: 2020-06-23
Payer: MEDICARE

## 2020-06-23 ENCOUNTER — HOSPITAL ENCOUNTER (OUTPATIENT)
Age: 65
Setting detail: OUTPATIENT SURGERY
Discharge: HOME | End: 2020-06-23
Attending: INTERNAL MEDICINE | Admitting: INTERNAL MEDICINE
Payer: MEDICARE

## 2020-06-23 ENCOUNTER — ANESTHESIA EVENT (OUTPATIENT)
Dept: ENDOSCOPY | Age: 65
End: 2020-06-23
Payer: MEDICARE

## 2020-06-23 VITALS
HEART RATE: 75 BPM | RESPIRATION RATE: 18 BRPM | SYSTOLIC BLOOD PRESSURE: 134 MMHG | TEMPERATURE: 98 F | DIASTOLIC BLOOD PRESSURE: 81 MMHG | OXYGEN SATURATION: 91 %

## 2020-06-23 LAB — GLUCOSE BLD STRIP.AUTO-MCNC: 192 MG/DL (ref 65–100)

## 2020-06-23 PROCEDURE — 77030028690 HC NDL ASPIR ULTRSND BSC -E: Performed by: INTERNAL MEDICINE

## 2020-06-23 PROCEDURE — 88173 CYTOPATH EVAL FNA REPORT: CPT

## 2020-06-23 PROCEDURE — 88312 SPECIAL STAINS GROUP 1: CPT

## 2020-06-23 PROCEDURE — 74011250636 HC RX REV CODE- 250/636: Performed by: NURSE ANESTHETIST, CERTIFIED REGISTERED

## 2020-06-23 PROCEDURE — 74011250636 HC RX REV CODE- 250/636: Performed by: INTERNAL MEDICINE

## 2020-06-23 PROCEDURE — 88304 TISSUE EXAM BY PATHOLOGIST: CPT

## 2020-06-23 PROCEDURE — 88305 TISSUE EXAM BY PATHOLOGIST: CPT

## 2020-06-23 PROCEDURE — 76060000032 HC ANESTHESIA 0.5 TO 1 HR: Performed by: INTERNAL MEDICINE

## 2020-06-23 PROCEDURE — 74011000250 HC RX REV CODE- 250: Performed by: NURSE ANESTHETIST, CERTIFIED REGISTERED

## 2020-06-23 PROCEDURE — 76040000026: Performed by: INTERNAL MEDICINE

## 2020-06-23 PROCEDURE — 77030021593 HC FCPS BIOP ENDOSC BSC -A: Performed by: INTERNAL MEDICINE

## 2020-06-23 PROCEDURE — 82962 GLUCOSE BLOOD TEST: CPT

## 2020-06-23 PROCEDURE — 77030008969: Performed by: INTERNAL MEDICINE

## 2020-06-23 RX ORDER — LIDOCAINE HYDROCHLORIDE 40 MG/ML
SOLUTION TOPICAL AS NEEDED
Status: DISCONTINUED | OUTPATIENT
Start: 2020-06-23 | End: 2020-06-23 | Stop reason: HOSPADM

## 2020-06-23 RX ORDER — SODIUM CHLORIDE, SODIUM LACTATE, POTASSIUM CHLORIDE, CALCIUM CHLORIDE 600; 310; 30; 20 MG/100ML; MG/100ML; MG/100ML; MG/100ML
1000 INJECTION, SOLUTION INTRAVENOUS CONTINUOUS
Status: DISCONTINUED | OUTPATIENT
Start: 2020-06-23 | End: 2020-06-23 | Stop reason: HOSPADM

## 2020-06-23 RX ORDER — LIDOCAINE HYDROCHLORIDE 20 MG/ML
INJECTION, SOLUTION EPIDURAL; INFILTRATION; INTRACAUDAL; PERINEURAL AS NEEDED
Status: DISCONTINUED | OUTPATIENT
Start: 2020-06-23 | End: 2020-06-23 | Stop reason: HOSPADM

## 2020-06-23 RX ORDER — LEVOFLOXACIN 5 MG/ML
500 INJECTION, SOLUTION INTRAVENOUS ONCE
Status: DISCONTINUED | OUTPATIENT
Start: 2020-06-23 | End: 2020-06-23

## 2020-06-23 RX ORDER — PROPOFOL 10 MG/ML
INJECTION, EMULSION INTRAVENOUS AS NEEDED
Status: DISCONTINUED | OUTPATIENT
Start: 2020-06-23 | End: 2020-06-23 | Stop reason: HOSPADM

## 2020-06-23 RX ORDER — LEVOFLOXACIN 5 MG/ML
500 INJECTION, SOLUTION INTRAVENOUS
Status: COMPLETED | OUTPATIENT
Start: 2020-06-23 | End: 2020-06-23

## 2020-06-23 RX ORDER — SODIUM CHLORIDE, SODIUM LACTATE, POTASSIUM CHLORIDE, CALCIUM CHLORIDE 600; 310; 30; 20 MG/100ML; MG/100ML; MG/100ML; MG/100ML
INJECTION, SOLUTION INTRAVENOUS
Status: DISCONTINUED | OUTPATIENT
Start: 2020-06-23 | End: 2020-06-23 | Stop reason: HOSPADM

## 2020-06-23 RX ADMIN — PROPOFOL 20 MG: 10 INJECTION, EMULSION INTRAVENOUS at 14:29

## 2020-06-23 RX ADMIN — PROPOFOL 20 MG: 10 INJECTION, EMULSION INTRAVENOUS at 14:12

## 2020-06-23 RX ADMIN — PROPOFOL 20 MG: 10 INJECTION, EMULSION INTRAVENOUS at 14:21

## 2020-06-23 RX ADMIN — PROPOFOL 20 MG: 10 INJECTION, EMULSION INTRAVENOUS at 14:17

## 2020-06-23 RX ADMIN — PROPOFOL 20 MG: 10 INJECTION, EMULSION INTRAVENOUS at 14:14

## 2020-06-23 RX ADMIN — PROPOFOL 20 MG: 10 INJECTION, EMULSION INTRAVENOUS at 14:25

## 2020-06-23 RX ADMIN — PROPOFOL 20 MG: 10 INJECTION, EMULSION INTRAVENOUS at 14:41

## 2020-06-23 RX ADMIN — PROPOFOL 20 MG: 10 INJECTION, EMULSION INTRAVENOUS at 14:35

## 2020-06-23 RX ADMIN — PROPOFOL 20 MG: 10 INJECTION, EMULSION INTRAVENOUS at 14:39

## 2020-06-23 RX ADMIN — PROPOFOL 20 MG: 10 INJECTION, EMULSION INTRAVENOUS at 14:43

## 2020-06-23 RX ADMIN — PROPOFOL 50 MG: 10 INJECTION, EMULSION INTRAVENOUS at 14:11

## 2020-06-23 RX ADMIN — PROPOFOL 20 MG: 10 INJECTION, EMULSION INTRAVENOUS at 14:31

## 2020-06-23 RX ADMIN — PROPOFOL 20 MG: 10 INJECTION, EMULSION INTRAVENOUS at 14:23

## 2020-06-23 RX ADMIN — PROPOFOL 20 MG: 10 INJECTION, EMULSION INTRAVENOUS at 14:27

## 2020-06-23 RX ADMIN — SODIUM CHLORIDE, SODIUM LACTATE, POTASSIUM CHLORIDE, AND CALCIUM CHLORIDE 1000 ML: 600; 310; 30; 20 INJECTION, SOLUTION INTRAVENOUS at 13:19

## 2020-06-23 RX ADMIN — LIDOCAINE HYDROCHLORIDE 80 MG: 20 INJECTION, SOLUTION EPIDURAL; INFILTRATION; INTRACAUDAL; PERINEURAL at 14:11

## 2020-06-23 RX ADMIN — SODIUM CHLORIDE, SODIUM LACTATE, POTASSIUM CHLORIDE, AND CALCIUM CHLORIDE: 600; 310; 30; 20 INJECTION, SOLUTION INTRAVENOUS at 12:35

## 2020-06-23 RX ADMIN — PROPOFOL 20 MG: 10 INJECTION, EMULSION INTRAVENOUS at 14:37

## 2020-06-23 RX ADMIN — PROPOFOL 20 MG: 10 INJECTION, EMULSION INTRAVENOUS at 14:16

## 2020-06-23 RX ADMIN — PROPOFOL 20 MG: 10 INJECTION, EMULSION INTRAVENOUS at 14:33

## 2020-06-23 RX ADMIN — PROPOFOL 20 MG: 10 INJECTION, EMULSION INTRAVENOUS at 14:13

## 2020-06-23 RX ADMIN — PROPOFOL 20 MG: 10 INJECTION, EMULSION INTRAVENOUS at 14:19

## 2020-06-23 RX ADMIN — PROPOFOL 20 MG: 10 INJECTION, EMULSION INTRAVENOUS at 14:45

## 2020-06-23 RX ADMIN — LEVOFLOXACIN 500 MG: 5 INJECTION, SOLUTION INTRAVENOUS at 14:43

## 2020-06-23 RX ADMIN — PROPOFOL 20 MG: 10 INJECTION, EMULSION INTRAVENOUS at 14:15

## 2020-06-23 RX ADMIN — LIDOCAINE HYDROCHLORIDE 3 ML: 40 SOLUTION TOPICAL at 14:07

## 2020-06-23 NOTE — ANESTHESIA PREPROCEDURE EVALUATION
Relevant Problems   No relevant active problems       Anesthetic History   No history of anesthetic complications       Comments: Slow to awaken with cysto May 2020     Review of Systems / Medical History  Patient summary reviewed and pertinent labs reviewed    Pulmonary        Sleep apnea (s/p UPPP, does not wear CPAP and wife reports very heavy snoring)           Neuro/Psych   Within defined limits           Cardiovascular    Hypertension: well controlled              Exercise tolerance: >4 METS     GI/Hepatic/Renal         Renal disease (Renal mass)      Comments: Cystic pancreatic mass Endo/Other    Diabetes: poorly controlled, type 2, using insulin  Hypothyroidism: well controlled  Morbid obesity and arthritis     Other Findings              Physical Exam    Airway  Mallampati: II  TM Distance: > 6 cm  Neck ROM: normal range of motion   Mouth opening: Normal     Cardiovascular    Rhythm: regular  Rate: normal         Dental  No notable dental hx       Pulmonary  Breath sounds clear to auscultation               Abdominal         Other Findings            Anesthetic Plan    ASA: 3  Anesthesia type: total IV anesthesia          Induction: Intravenous  Anesthetic plan and risks discussed with: Patient

## 2020-06-23 NOTE — PROCEDURES
ENDOSCOPIC ULTRASOUND PROCEDURE NOTE    DATE OF PROCEDURE: 6/23/2020    PRE-OP DIAGNOSIS:  Abnormal CT   Pancreatic cyst     POST-OP DIAGNOSIS:  Moderate gastritis   Complex pancreatic cyst-possible serous cystadenoma versus mucinous cystic neoplasm Or pseudocyst     MEDICATIONS ADMINISTERED: MAC    INSTRUMENT: [address] T180    PROCEDURE:  After obtaining informed consent, the patient was placed in the left lateral position and sedated. The echoendoscope was advanced to the upper GI tract without difficulty. The scope was slowly removed while detailed images of the adjacent organs were obtained. The patient was taken to the recovery area in stable condition. FINDINGS:  ESOPHAGUS: Not well visualized  STOMACH: Limited views with the oblique-viewing echoendoscope Demonstrated moderate antral gastritis with erythema friability and a few small erosions. Multiple biopsies obtained with cold forceps. DUODENUM: Limited views with the oblique-viewing echoendoscope were unremarkable. PANCREAS: The pancreas was well visualized from the head to the tail. The ampulla appears normal endosonographically. There is a large complex cystic lesion involving the uncinate process and the body of the pancreas. There is a large fluid component measuring 46 x 33 mm. There are no visible septations, Mural nodules or surrounding solid mass. It has a lobular outer margin. Directly adjacent is a microcystic portion measuring 38 x 39 mm. This has the appearance of a serous cystadenoma. Given the atypical appearance of semi solid and cystic components, this could also represent a mucinous cystic neoplasm, pancreatic pseudocyst, or cystic degeneration. FNA was performed with a Hulafrog acquired 22-gauge needle. 2 passes were made from a transgastric approach. First fast in this microcystic portion returned tissue sent in formalin.  The second pass into the fluid component aspirated 40 cc of light yellow thin clear fluid and sent for cytology, CEA, amylase, and possible genetic testing. The cyst compartment completely collapsed with aspiration. Prophylactic antibiotics were given. No other cysts were identified in the pancreas. The main pancreatic duct measures 1-2 mm throughout. New direct communication is visualized between the cystic lesion in the pancreatic duct. There are hypoechoic strands in a slightly heterogeneous appearance in the body and tail, but no diagnostic features of chronic pancreatitis. BILIARY SYSTEM:  Gallbladder appears normal. Common bile duct is 5.5 mm and normal in appearance. OTHER ORGANS: There was no ascites in the upper abdomen. Limited views of the left lobe of the liver demonstrated no solid mass lesions. The celiac axis appears normal.  No significant celiac or vilma-pancreatic lymphadenopathy.     Estimated blood loss: 0-minimal     PLAN:  Begin Protonix   Prophylactic antibiotics-Cipro   Schedule screening colonoscopy   Avoid NSAIDs   Further recommendations will depend on cyst aspiration results     Boo Grace MD  [facility]

## 2020-06-23 NOTE — ANESTHESIA POSTPROCEDURE EVALUATION
Procedure(s):  ENDOSCOPIC ULTRASOUND (EUS)/BMI 39  ESOPHAGOGASTRODUODENAL (EGD) BIOPSY  ESOPHAGOGASTRODUODENOSCOPY (EGD)  FINE NEEDLE ASPIRATION. total IV anesthesia    Anesthesia Post Evaluation        Patient location during evaluation: PACU  Patient participation: complete - patient participated  Level of consciousness: awake  Pain management: adequate  Airway patency: patent  Anesthetic complications: no  Cardiovascular status: acceptable  Respiratory status: acceptable  Hydration status: acceptable  Post anesthesia nausea and vomiting:  none      INITIAL Post-op Vital signs:   Vitals Value Taken Time   /81 6/23/2020  3:22 PM   Temp     Pulse 81 6/23/2020  3:23 PM   Resp 16 6/23/2020  3:12 PM   SpO2 91 % 6/23/2020  3:23 PM   Vitals shown include unvalidated device data.
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
## 2020-06-23 NOTE — DISCHARGE INSTRUCTIONS
Gastrointestinal Esophagogastroduodenoscopy (EGD) - Upper Exam Discharge Instructions    1. Call Dr. Rodolfo Fabian at [phone] for any problems or questions. 2. Contact the doctor's office for follow up appointment as directed. 3. Medication may cause drowsiness for several hours, therefore:  · Do not drive or operate machinery for remainder of the day. · No alcohol today. · Do not make any important or legal decisions for 24 hours. · Do not sign any legal documents for 24 hours. 5. Ordinarily, you may resume regular diet and activity after exam unless otherwise specified by your physician. 6. For mild soreness in your throat you may use Cepacol throat lozenges or warm salt-water gargles as needed. Any additional instructions:   1. Begin Protonix prescription  2. Prophylactic antibiotics-Cipro   3. Schedule screening [facility] with [facility]  4. Avoid NSAIDs   5. Await pathology     Instructions given to McLeod Crmercedezzina and other family members.
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
## 2020-06-23 NOTE — ROUTINE PROCESS
VSS. Discharge instructions given to patient and family. Pt denies any needs. Pt wheeled out to car via wheelchair with staff.

## 2020-06-27 LAB
Lab: NORMAL
REFERENCE LAB,REFLB: NORMAL
TEST DESCRIPTION:,ATST: NORMAL

## 2020-07-06 ENCOUNTER — HOSPITAL ENCOUNTER (OUTPATIENT)
Dept: CT IMAGING | Age: 65
Discharge: HOME | End: 2020-07-06
Attending: NURSE PRACTITIONER
Payer: MEDICARE

## 2020-07-06 VITALS
SYSTOLIC BLOOD PRESSURE: 135 MMHG | HEIGHT: 69 IN | WEIGHT: 270 LBS | DIASTOLIC BLOOD PRESSURE: 78 MMHG | RESPIRATION RATE: 18 BRPM | HEART RATE: 80 BPM | OXYGEN SATURATION: 92 % | TEMPERATURE: 98.2 F | BODY MASS INDEX: 39.99 KG/M2

## 2020-07-06 DIAGNOSIS — N28.89 LEFT RENAL MASS: ICD-10-CM

## 2020-07-06 LAB — GLUCOSE BLD STRIP.AUTO-MCNC: 266 MG/DL (ref 65–100)

## 2020-07-06 PROCEDURE — 88305 TISSUE EXAM BY PATHOLOGIST: CPT

## 2020-07-06 PROCEDURE — 82962 GLUCOSE BLOOD TEST: CPT

## 2020-07-06 PROCEDURE — 74011000250 HC RX REV CODE- 250: Performed by: RADIOLOGY

## 2020-07-06 PROCEDURE — 88342 IMHCHEM/IMCYTCHM 1ST ANTB: CPT

## 2020-07-06 PROCEDURE — 74011250636 HC RX REV CODE- 250/636: Performed by: RADIOLOGY

## 2020-07-06 PROCEDURE — 99152 MOD SED SAME PHYS/QHP 5/>YRS: CPT

## 2020-07-06 PROCEDURE — 50200 RENAL BIOPSY PERQ: CPT

## 2020-07-06 PROCEDURE — 99153 MOD SED SAME PHYS/QHP EA: CPT

## 2020-07-06 PROCEDURE — 88341 IMHCHEM/IMCYTCHM EA ADD ANTB: CPT

## 2020-07-06 RX ORDER — MIDAZOLAM HYDROCHLORIDE 1 MG/ML
.5-2 INJECTION, SOLUTION INTRAMUSCULAR; INTRAVENOUS
Status: DISCONTINUED | OUTPATIENT
Start: 2020-07-06 | End: 2020-07-10 | Stop reason: HOSPADM

## 2020-07-06 RX ORDER — LIDOCAINE HYDROCHLORIDE 20 MG/ML
0.2 INJECTION, SOLUTION INFILTRATION; PERINEURAL ONCE
Status: COMPLETED | OUTPATIENT
Start: 2020-07-06 | End: 2020-07-06

## 2020-07-06 RX ORDER — FENTANYL CITRATE 50 UG/ML
25-50 INJECTION, SOLUTION INTRAMUSCULAR; INTRAVENOUS
Status: DISCONTINUED | OUTPATIENT
Start: 2020-07-06 | End: 2020-07-10 | Stop reason: HOSPADM

## 2020-07-06 RX ORDER — SODIUM CHLORIDE 9 MG/ML
25 INJECTION, SOLUTION INTRAVENOUS ONCE
Status: COMPLETED | OUTPATIENT
Start: 2020-07-06 | End: 2020-07-06

## 2020-07-06 RX ORDER — DIPHENHYDRAMINE HYDROCHLORIDE 50 MG/ML
12.5-5 INJECTION, SOLUTION INTRAMUSCULAR; INTRAVENOUS ONCE
Status: COMPLETED | OUTPATIENT
Start: 2020-07-06 | End: 2020-07-06

## 2020-07-06 RX ADMIN — DIPHENHYDRAMINE HYDROCHLORIDE 50 MG: 50 INJECTION, SOLUTION INTRAMUSCULAR; INTRAVENOUS at 09:56

## 2020-07-06 RX ADMIN — LIDOCAINE HYDROCHLORIDE 4 MG: 20 INJECTION, SOLUTION INFILTRATION; PERINEURAL at 10:08

## 2020-07-06 RX ADMIN — FENTANYL CITRATE 50 MCG: 50 INJECTION, SOLUTION INTRAMUSCULAR; INTRAVENOUS at 10:08

## 2020-07-06 RX ADMIN — SODIUM CHLORIDE 25 ML/HR: 900 INJECTION, SOLUTION INTRAVENOUS at 09:45

## 2020-07-06 RX ADMIN — FENTANYL CITRATE 50 MCG: 50 INJECTION, SOLUTION INTRAMUSCULAR; INTRAVENOUS at 09:56

## 2020-07-06 RX ADMIN — MIDAZOLAM 1 MG: 1 INJECTION INTRAMUSCULAR; INTRAVENOUS at 09:56

## 2020-07-06 RX ADMIN — MIDAZOLAM 1 MG: 1 INJECTION INTRAMUSCULAR; INTRAVENOUS at 10:08

## 2020-07-06 NOTE — PROGRESS NOTES
Called CT regarding room availability. They stated the floor pt just got there, to give them 20 minutes.
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
## 2020-07-06 NOTE — DISCHARGE INSTRUCTIONS
[address]  Department of Interventional Radiology  [address] [facility]  [phone] Office  [phone] Fax    BIOPSY DISCHARGE INSTRUCTIONS    General Instructions:     A biopsy is the removal of a small piece of tissue for microscopic examination or testing. Healthy tissue can be obtained for the purpose of tissue-type matching for transplants. Unhealthy tissues are more commonly biopsied to diagnose disease. Lung Biopsy:     A needle lung biopsy is performed when there is a mass discovered in the lung area. The most serious risk is infection, bleeding, and pneumothorax (a collapsed lung). Signs of pneumothorax include shortness of breath, rapid heart rate, and blueness of the skin. If any of these occur, call 911. Liver Biopsy: This test helps detect cancer, infections, and the cause of an enlargement of the liver or elevated liver enzymes. It also helps to diagnose a number of liver diseases. The pain associated with the procedure may be felt in the shoulder. The risks include internal bleeding, pneumothorax, and injury to the surrounding organs. Renal Biopsy: This procedure is sometimes done to evaluate a transplanted kidney. It is also used to evaluate unexplained decrease in kidney function, blood, or protein in the urine. You may see bright red blood in the urine the first 24 hours after the test. If the bleeding lasts longer, you need to call your doctor. There is a risk of infection and bleeding into the muscle, which may cause soreness. Spinal Biopsy: This test is sometimes done in conjunction with other procedures. Your back will be sore, as we are taking a small sample of bone, which is slightly more difficult to sample than tissue. General Biopsy:     A mass can grow in any area of the body, and we are taking a specimen as ordered by your doctor. The risks are the same.  They include bleeding, pain, and infection. Home Care Instructions: You may resume your regular diet and medication regimen. Do not drink alcohol, drive, or make any important legal decisions in the next 24 hours. Do not lift anything heavier than a gallon of milk until the soreness goes away. You may use over the counter acetaminophen or ibuprofen for the soreness. You may apply an ice pack to the affected area for 20-30 minutes at time for the first 24 hours. After that, you may apply a heat pack. Call If: You should call your Physician and/or the Radiology Nurse if you have any questions or concerns about the biopsy site. Call if you should have increased pain, fever, redness, drainage, or bleeding more than a small spot on the bandage. Follow-Up Instructions: Please see your ordering doctor as he/she has requested. To Reach Us: If you have any questions about your procedure, please call the Interventional Radiology department at [phone]. After business hours (5pm) and weekends, call the answering service at [phone] and ask for the Radiologist on call to be paged. Si tiene Preguntas acerca del procedimiento, por favor llame al departamento de Radiología Intervencional al [phone]. Después de horas de oficina (5 pm) y los fines de [place], llamar al Leena Ko al [phone] y pregunte por el Rad[facility]ologo de [place] [facility]. Interventional Radiology General Nurse Discharge    After general anesthesia or intravenous sedation, for 24 hours or while taking prescription Narcotics:  · Limit your activities  · Do not drive and operate hazardous machinery  · Do not make important personal or business decisions  · Do  not drink alcoholic beverages  · If you have not urinated within 8 hours after discharge, please contact your surgeon on call. * Please give a list of your current medications to your Primary Care Provider.   * Please update this list whenever your medications are discontinued, doses are changed, or new medications (including over-the-counter products) are added. * Please carry medication information at all times in case of emergency situations. These are general instructions for a healthy lifestyle:    No smoking/ No tobacco products/ Avoid exposure to second hand smoke  Surgeon General's Warning:  Quitting smoking now greatly reduces serious risk to your health. Obesity, smoking, and sedentary lifestyle greatly increases your risk for illness  A healthy diet, regular physical exercise & weight monitoring are important for maintaining a healthy lifestyle    You may be retaining fluid if you have a history of heart failure or if you experience any of the following symptoms:  Weight gain of 3 pounds or more overnight or 5 pounds in a week, increased swelling in our hands or feet or shortness of breath while lying flat in bed. Please call your doctor as soon as you notice any of these symptoms; do not wait until your next office visit. Recognize signs and symptoms of STROKE:  F-face looks uneven    A-arms unable to move or move unevenly    S-speech slurred or non-existent    T-time-call 911 as soon as signs and symptoms begin-DO NOT go       Back to bed or wait to see if you get better-TIME IS BRAIN.       Patient Signature:  Date: 7/6/2020  Discharging Nurse: Currie Mohs, RN

## 2020-07-06 NOTE — PROGRESS NOTES
CT unavailable; stated they have a patient on the way down form the floor. To give them 20 minutes or so.

## 2020-07-06 NOTE — PROCEDURES
Department of Interventional Radiology  [phone]        Interventional Radiology Brief Procedure Note    Patient: Erin Oviedo MRN: [id number]  SSN: [id number]    YOB: 1955  Age: 71 y.o.   Sex: male      Date of Procedure: 7/6/2020    Pre-Procedure Diagnosis: renal mass    Post-Procedure Diagnosis: SAME    Procedure(s): Image Guided Biopsy    Brief Description of Procedure: as above    Performed By: Jane Restrepo MD     Assistants: None    Anesthesia:Moderate Sedation    Estimated Blood Loss:  10-20 ml    Specimens:  Pathology    Implants:  None    Findings: left perihilar mass    Complications: None    Recommendations: close monitoring for bleeding     Follow Up: as needed    Signed By: Jane Restrepo MD     July 6, 2020

## 2020-07-06 NOTE — PROGRESS NOTES
TRANSFER - OUT REPORT:    Verbal report given to [facility] on Freeda Leaks  being transferred to IR room #5 for routine progression of care       Report consisted of patients Situation, Background, Assessment and   Recommendations(SBAR). Information from the following report(s) SBAR, Kardex, Procedure Summary and MAR was reviewed with the receiving nurse. Lines:   Peripheral IV 07/06/20 Left Forearm (Active)        Opportunity for questions and clarification was provided.       Patient transported with:   Registered Nurse

## 2020-08-17 ENCOUNTER — HOSPITAL ENCOUNTER (INPATIENT)
Age: 65
LOS: 2 days | Discharge: HOME | DRG: 377 | End: 2020-08-19
Attending: EMERGENCY MEDICINE | Admitting: HOSPITALIST
Payer: MEDICARE

## 2020-08-17 ENCOUNTER — APPOINTMENT (OUTPATIENT)
Dept: ULTRASOUND IMAGING | Age: 65
DRG: 377 | End: 2020-08-17
Attending: HOSPITALIST
Payer: MEDICARE

## 2020-08-17 DIAGNOSIS — K92.2 UPPER GI BLEED: Primary | ICD-10-CM

## 2020-08-17 PROBLEM — D62 ACUTE BLOOD LOSS ANEMIA: Status: ACTIVE | Noted: 2020-08-17

## 2020-08-17 PROBLEM — C64.2 MALIGNANT NEOPLASM OF LEFT KIDNEY (HCC): Status: ACTIVE | Noted: 2020-08-17

## 2020-08-17 PROBLEM — N17.0 ACUTE KIDNEY INJURY (AKI) WITH ACUTE TUBULAR NECROSIS (ATN) (HCC): Status: ACTIVE | Noted: 2020-08-17

## 2020-08-17 LAB
ALBUMIN SERPL-MCNC: 3.3 G/DL (ref 3.2–4.6)
ALBUMIN/GLOB SERPL: 1.1 {RATIO} (ref 1.2–3.5)
ALP SERPL-CCNC: 50 U/L (ref 50–136)
ALT SERPL-CCNC: 46 U/L (ref 12–65)
ANION GAP SERPL CALC-SCNC: 7 MMOL/L (ref 7–16)
AST SERPL-CCNC: 20 U/L (ref 15–37)
BASOPHILS # BLD: 0.1 K/UL (ref 0–0.2)
BASOPHILS NFR BLD: 1 % (ref 0–2)
BILIRUB SERPL-MCNC: 1.2 MG/DL (ref 0.2–1.1)
BUN SERPL-MCNC: 47 MG/DL (ref 8–23)
CALCIUM SERPL-MCNC: 8.5 MG/DL (ref 8.3–10.4)
CHLORIDE SERPL-SCNC: 103 MMOL/L (ref 98–107)
CO2 SERPL-SCNC: 28 MMOL/L (ref 21–32)
CREAT SERPL-MCNC: 1.24 MG/DL (ref 0.8–1.5)
DIFFERENTIAL METHOD BLD: ABNORMAL
EOSINOPHIL # BLD: 0.1 K/UL (ref 0–0.8)
EOSINOPHIL NFR BLD: 1 % (ref 0.5–7.8)
ERYTHROCYTE [DISTWIDTH] IN BLOOD BY AUTOMATED COUNT: 17.8 % (ref 11.9–14.6)
FERRITIN SERPL-MCNC: 60 NG/ML (ref 8–388)
GLOBULIN SER CALC-MCNC: 3 G/DL (ref 2.3–3.5)
GLUCOSE BLD STRIP.AUTO-MCNC: 317 MG/DL (ref 65–100)
GLUCOSE SERPL-MCNC: 444 MG/DL (ref 65–100)
HCT VFR BLD AUTO: 25.6 % (ref 41.1–50.3)
HCT VFR BLD AUTO: 26.1 % (ref 41.1–50.3)
HGB BLD-MCNC: 7.9 G/DL (ref 13.6–17.2)
HGB BLD-MCNC: 7.9 G/DL (ref 13.6–17.2)
IMM GRANULOCYTES # BLD AUTO: 0.1 K/UL (ref 0–0.5)
IMM GRANULOCYTES NFR BLD AUTO: 1 % (ref 0–5)
IRON SATN MFR SERPL: 83 %
IRON SERPL-MCNC: 290 UG/DL (ref 35–150)
IRON SERPL-MCNC: 293 UG/DL (ref 35–150)
LIPASE SERPL-CCNC: 173 U/L (ref 73–393)
LYMPHOCYTES # BLD: 2.8 K/UL (ref 0.5–4.6)
LYMPHOCYTES NFR BLD: 23 % (ref 13–44)
MCH RBC QN AUTO: 20.1 PG (ref 26.1–32.9)
MCHC RBC AUTO-ENTMCNC: 30.3 G/DL (ref 31.4–35)
MCV RBC AUTO: 66.2 FL (ref 79.6–97.8)
MONOCYTES # BLD: 1 K/UL (ref 0.1–1.3)
MONOCYTES NFR BLD: 8 % (ref 4–12)
NEUTS SEG # BLD: 8.3 K/UL (ref 1.7–8.2)
NEUTS SEG NFR BLD: 67 % (ref 43–78)
NRBC # BLD: 0.02 K/UL (ref 0–0.2)
PLATELET # BLD AUTO: 223 K/UL (ref 150–450)
PMV BLD AUTO: 12.3 FL (ref 9.4–12.3)
POTASSIUM SERPL-SCNC: 4.5 MMOL/L (ref 3.5–5.1)
PROT SERPL-MCNC: 6.3 G/DL (ref 6.3–8.2)
RBC # BLD AUTO: 3.94 M/UL (ref 4.23–5.6)
SODIUM SERPL-SCNC: 138 MMOL/L (ref 136–145)
TIBC SERPL-MCNC: 355 UG/DL (ref 250–450)
VIT B12 SERPL-MCNC: 480 PG/ML (ref 193–986)
WBC # BLD AUTO: 12.3 K/UL (ref 4.3–11.1)

## 2020-08-17 PROCEDURE — 82962 GLUCOSE BLOOD TEST: CPT

## 2020-08-17 PROCEDURE — 76705 ECHO EXAM OF ABDOMEN: CPT

## 2020-08-17 PROCEDURE — 82607 VITAMIN B-12: CPT

## 2020-08-17 PROCEDURE — 74011250636 HC RX REV CODE- 250/636: Performed by: EMERGENCY MEDICINE

## 2020-08-17 PROCEDURE — 74011636637 HC RX REV CODE- 636/637: Performed by: HOSPITALIST

## 2020-08-17 PROCEDURE — 96361 HYDRATE IV INFUSION ADD-ON: CPT

## 2020-08-17 PROCEDURE — 74011250637 HC RX REV CODE- 250/637: Performed by: HOSPITALIST

## 2020-08-17 PROCEDURE — 74011636637 HC RX REV CODE- 636/637: Performed by: EMERGENCY MEDICINE

## 2020-08-17 PROCEDURE — 83540 ASSAY OF IRON: CPT

## 2020-08-17 PROCEDURE — 36430 TRANSFUSION BLD/BLD COMPNT: CPT

## 2020-08-17 PROCEDURE — 86923 COMPATIBILITY TEST ELECTRIC: CPT

## 2020-08-17 PROCEDURE — 74011250636 HC RX REV CODE- 250/636: Performed by: HOSPITALIST

## 2020-08-17 PROCEDURE — C9113 INJ PANTOPRAZOLE SODIUM, VIA: HCPCS | Performed by: EMERGENCY MEDICINE

## 2020-08-17 PROCEDURE — 93005 ELECTROCARDIOGRAM TRACING: CPT | Performed by: EMERGENCY MEDICINE

## 2020-08-17 PROCEDURE — 82728 ASSAY OF FERRITIN: CPT

## 2020-08-17 PROCEDURE — 96375 TX/PRO/DX INJ NEW DRUG ADDON: CPT

## 2020-08-17 PROCEDURE — 74011000250 HC RX REV CODE- 250: Performed by: EMERGENCY MEDICINE

## 2020-08-17 PROCEDURE — 96374 THER/PROPH/DIAG INJ IV PUSH: CPT

## 2020-08-17 PROCEDURE — 85018 HEMOGLOBIN: CPT

## 2020-08-17 PROCEDURE — 83690 ASSAY OF LIPASE: CPT

## 2020-08-17 PROCEDURE — 80053 COMPREHEN METABOLIC PANEL: CPT

## 2020-08-17 PROCEDURE — 85025 COMPLETE CBC W/AUTO DIFF WBC: CPT

## 2020-08-17 PROCEDURE — 99284 EMERGENCY DEPT VISIT MOD MDM: CPT

## 2020-08-17 PROCEDURE — 65270000029 HC RM PRIVATE

## 2020-08-17 PROCEDURE — 86900 BLOOD TYPING SEROLOGIC ABO: CPT

## 2020-08-17 PROCEDURE — 30233N1 TRANSFUSION OF NONAUTOLOGOUS RED BLOOD CELLS INTO PERIPHERAL VEIN, PERCUTANEOUS APPROACH: ICD-10-PCS | Performed by: EMERGENCY MEDICINE

## 2020-08-17 PROCEDURE — P9016 RBC LEUKOCYTES REDUCED: HCPCS

## 2020-08-17 RX ORDER — INSULIN GLARGINE 100 [IU]/ML
25 INJECTION, SOLUTION SUBCUTANEOUS
Status: DISCONTINUED | OUTPATIENT
Start: 2020-08-17 | End: 2020-08-18

## 2020-08-17 RX ORDER — INSULIN LISPRO 100 [IU]/ML
INJECTION, SOLUTION INTRAVENOUS; SUBCUTANEOUS EVERY 4 HOURS
Status: DISCONTINUED | OUTPATIENT
Start: 2020-08-17 | End: 2020-08-18

## 2020-08-17 RX ORDER — POTASSIUM CHLORIDE 7.45 MG/ML
10 INJECTION INTRAVENOUS AS NEEDED
Status: DISCONTINUED | OUTPATIENT
Start: 2020-08-17 | End: 2020-08-19 | Stop reason: HOSPADM

## 2020-08-17 RX ORDER — SODIUM CHLORIDE 0.9 % (FLUSH) 0.9 %
5-40 SYRINGE (ML) INJECTION AS NEEDED
Status: DISCONTINUED | OUTPATIENT
Start: 2020-08-17 | End: 2020-08-19 | Stop reason: HOSPADM

## 2020-08-17 RX ORDER — PROMETHAZINE HYDROCHLORIDE 25 MG/1
12.5 TABLET ORAL
Status: DISCONTINUED | OUTPATIENT
Start: 2020-08-17 | End: 2020-08-19 | Stop reason: HOSPADM

## 2020-08-17 RX ORDER — MAGNESIUM SULFATE HEPTAHYDRATE 40 MG/ML
2 INJECTION, SOLUTION INTRAVENOUS AS NEEDED
Status: DISCONTINUED | OUTPATIENT
Start: 2020-08-17 | End: 2020-08-19 | Stop reason: HOSPADM

## 2020-08-17 RX ORDER — ONDANSETRON 2 MG/ML
4 INJECTION INTRAMUSCULAR; INTRAVENOUS
Status: DISCONTINUED | OUTPATIENT
Start: 2020-08-17 | End: 2020-08-19 | Stop reason: HOSPADM

## 2020-08-17 RX ORDER — SODIUM CHLORIDE 0.9 % (FLUSH) 0.9 %
5-40 SYRINGE (ML) INJECTION EVERY 8 HOURS
Status: DISCONTINUED | OUTPATIENT
Start: 2020-08-17 | End: 2020-08-19 | Stop reason: HOSPADM

## 2020-08-17 RX ORDER — ACETAMINOPHEN 650 MG/1
650 SUPPOSITORY RECTAL
Status: DISCONTINUED | OUTPATIENT
Start: 2020-08-17 | End: 2020-08-19 | Stop reason: HOSPADM

## 2020-08-17 RX ORDER — POLYETHYLENE GLYCOL 3350 17 G/17G
17 POWDER, FOR SOLUTION ORAL DAILY PRN
Status: DISCONTINUED | OUTPATIENT
Start: 2020-08-17 | End: 2020-08-19 | Stop reason: HOSPADM

## 2020-08-17 RX ORDER — SODIUM CHLORIDE 9 MG/ML
250 INJECTION, SOLUTION INTRAVENOUS AS NEEDED
Status: DISCONTINUED | OUTPATIENT
Start: 2020-08-17 | End: 2020-08-19 | Stop reason: HOSPADM

## 2020-08-17 RX ORDER — PRAVASTATIN SODIUM 20 MG/1
40 TABLET ORAL
Status: DISCONTINUED | OUTPATIENT
Start: 2020-08-17 | End: 2020-08-19 | Stop reason: HOSPADM

## 2020-08-17 RX ORDER — ACETAMINOPHEN 325 MG/1
650 TABLET ORAL
Status: DISCONTINUED | OUTPATIENT
Start: 2020-08-17 | End: 2020-08-19 | Stop reason: HOSPADM

## 2020-08-17 RX ORDER — LEVOTHYROXINE SODIUM 100 UG/1
100 TABLET ORAL
Status: DISCONTINUED | OUTPATIENT
Start: 2020-08-18 | End: 2020-08-19 | Stop reason: HOSPADM

## 2020-08-17 RX ADMIN — OCTREOTIDE ACETATE 50 MCG/HR: 500 INJECTION, SOLUTION INTRAVENOUS; SUBCUTANEOUS at 19:59

## 2020-08-17 RX ADMIN — SODIUM CHLORIDE 1000 ML: 900 INJECTION, SOLUTION INTRAVENOUS at 15:47

## 2020-08-17 RX ADMIN — INSULIN GLARGINE 25 UNITS: 100 INJECTION, SOLUTION SUBCUTANEOUS at 20:48

## 2020-08-17 RX ADMIN — Medication 10 ML: at 20:46

## 2020-08-17 RX ADMIN — INSULIN LISPRO 12 UNITS: 100 INJECTION, SOLUTION INTRAVENOUS; SUBCUTANEOUS at 20:46

## 2020-08-17 RX ADMIN — PRAVASTATIN SODIUM 40 MG: 20 TABLET ORAL at 20:45

## 2020-08-17 RX ADMIN — INSULIN HUMAN 10 UNITS: 100 INJECTION, SOLUTION PARENTERAL at 17:13

## 2020-08-17 RX ADMIN — SODIUM CHLORIDE 40 MG: 9 INJECTION, SOLUTION INTRAMUSCULAR; INTRAVENOUS; SUBCUTANEOUS at 16:12

## 2020-08-17 NOTE — PROGRESS NOTES
Area of cytotoxic cerebral edema identified when reviewing brain imaging in the territory of the right middle cerebral artery. There is no mass effect associated with it. Patient remained neurologically stable.   TRANSFER - IN REPORT:    Verbal report received from [facility] RN(name) on [place]  being received from ER(unit) for routine progression of care      Report consisted of patients Situation, Background, Assessment and   Recommendations(SBAR). Information from the following report(s) SBAR, Kardex, ED Summary, Intake/Output and MAR was reviewed with the receiving nurse. Opportunity for questions and clarification was provided. Assessment will be completed upon patients arrival to unit and care assumed.

## 2020-08-17 NOTE — ED NOTES
TRANSFER - OUT REPORT:    Verbal report given to QUINTON High  on Supriya Hearing  being transferred to 6th floor  for routine progression of care       Report consisted of patients Situation, Background, Assessment and   Recommendations(SBAR). Information from the following report(s) ED Summary was reviewed with the receiving nurse. Lines:   Peripheral IV 08/17/20 Right Antecubital (Active)        Opportunity for questions and clarification was provided.       Patient transported with:  rosy
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
## 2020-08-17 NOTE — ED TRIAGE NOTES
Patient arrives via POV after being told to come to the ED because he has a \"hole in his bowel\". Primary RN had received report from MD office. Patient found to be near-syncopal in parking lot of ED by EMS and brought in by wheelchair by EMS.

## 2020-08-17 NOTE — PROGRESS NOTES
08/17/20 1934   Dual Skin Pressure Injury Assessment   Dual Skin Pressure Injury Assessment WDL  (Pt has a small scabbed sore to rt back,appears to be healing)   Second Care Provider (Based on [address] [place] [address]) Porter Casas RN   Skin Integumentary   Skin Integumentary (WDL) WDL    Pressure  Injury Documentation No Pressure Injury Noted-Pressure Ulcer Prevention Initiated
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
## 2020-08-18 ENCOUNTER — ANESTHESIA (OUTPATIENT)
Dept: ENDOSCOPY | Age: 65
DRG: 377 | End: 2020-08-18
Payer: MEDICARE

## 2020-08-18 ENCOUNTER — ANESTHESIA EVENT (OUTPATIENT)
Dept: ENDOSCOPY | Age: 65
DRG: 377 | End: 2020-08-18
Payer: MEDICARE

## 2020-08-18 LAB
ALBUMIN SERPL-MCNC: 3.1 G/DL (ref 3.2–4.6)
ALBUMIN/GLOB SERPL: 1.1 {RATIO} (ref 1.2–3.5)
ALP SERPL-CCNC: 46 U/L (ref 50–136)
ALT SERPL-CCNC: 44 U/L (ref 12–65)
ANION GAP SERPL CALC-SCNC: 4 MMOL/L (ref 7–16)
APPEARANCE UR: CLEAR
AST SERPL-CCNC: 34 U/L (ref 15–37)
ATRIAL RATE: 99 BPM
BACTERIA URNS QL MICRO: 0 /HPF
BILIRUB SERPL-MCNC: 2.6 MG/DL (ref 0.2–1.1)
BILIRUB UR QL: NEGATIVE
BUN SERPL-MCNC: 32 MG/DL (ref 8–23)
CALCIUM SERPL-MCNC: 8 MG/DL (ref 8.3–10.4)
CALCULATED P AXIS, ECG09: 51 DEGREES
CALCULATED R AXIS, ECG10: -14 DEGREES
CALCULATED T AXIS, ECG11: 33 DEGREES
CHLORIDE SERPL-SCNC: 113 MMOL/L (ref 98–107)
CO2 SERPL-SCNC: 30 MMOL/L (ref 21–32)
COLOR UR: YELLOW
CREAT SERPL-MCNC: 0.95 MG/DL (ref 0.8–1.5)
DIAGNOSIS, 93000: NORMAL
EPI CELLS #/AREA URNS HPF: ABNORMAL /HPF
GLOBULIN SER CALC-MCNC: 2.7 G/DL (ref 2.3–3.5)
GLUCOSE BLD STRIP.AUTO-MCNC: 133 MG/DL (ref 65–100)
GLUCOSE BLD STRIP.AUTO-MCNC: 201 MG/DL (ref 65–100)
GLUCOSE BLD STRIP.AUTO-MCNC: 213 MG/DL (ref 65–100)
GLUCOSE BLD STRIP.AUTO-MCNC: 230 MG/DL (ref 65–100)
GLUCOSE BLD STRIP.AUTO-MCNC: 259 MG/DL (ref 65–100)
GLUCOSE BLD STRIP.AUTO-MCNC: 282 MG/DL (ref 65–100)
GLUCOSE SERPL-MCNC: 122 MG/DL (ref 65–100)
GLUCOSE UR STRIP.AUTO-MCNC: NEGATIVE MG/DL
HCT VFR BLD AUTO: 30.3 % (ref 41.1–50.3)
HGB BLD-MCNC: 9.6 G/DL (ref 13.6–17.2)
HGB UR QL STRIP: ABNORMAL
KETONES UR QL STRIP.AUTO: NEGATIVE MG/DL
LEUKOCYTE ESTERASE UR QL STRIP.AUTO: ABNORMAL
MUCOUS THREADS URNS QL MICRO: ABNORMAL /LPF
NITRITE UR QL STRIP.AUTO: NEGATIVE
OTHER OBSERVATIONS,UCOM: ABNORMAL
P-R INTERVAL, ECG05: 164 MS
PH UR STRIP: 5.5 [PH] (ref 5–9)
POTASSIUM SERPL-SCNC: 4 MMOL/L (ref 3.5–5.1)
PROT SERPL-MCNC: 5.8 G/DL (ref 6.3–8.2)
PROT UR STRIP-MCNC: NEGATIVE MG/DL
Q-T INTERVAL, ECG07: 336 MS
QRS DURATION, ECG06: 90 MS
QTC CALCULATION (BEZET), ECG08: 431 MS
RBC #/AREA URNS HPF: ABNORMAL /HPF
SODIUM SERPL-SCNC: 147 MMOL/L (ref 136–145)
SP GR UR REFRACTOMETRY: 1.01 (ref 1–1.02)
UROBILINOGEN UR QL STRIP.AUTO: 0.2 EU/DL (ref 0.2–1)
VENTRICULAR RATE, ECG03: 99 BPM
WBC URNS QL MICRO: ABNORMAL /HPF

## 2020-08-18 PROCEDURE — 74011000250 HC RX REV CODE- 250: Performed by: NURSE ANESTHETIST, CERTIFIED REGISTERED

## 2020-08-18 PROCEDURE — 76040000025: Performed by: INTERNAL MEDICINE

## 2020-08-18 PROCEDURE — P9016 RBC LEUKOCYTES REDUCED: HCPCS

## 2020-08-18 PROCEDURE — 74011636637 HC RX REV CODE- 636/637: Performed by: INTERNAL MEDICINE

## 2020-08-18 PROCEDURE — 88305 TISSUE EXAM BY PATHOLOGIST: CPT

## 2020-08-18 PROCEDURE — 80053 COMPREHEN METABOLIC PANEL: CPT

## 2020-08-18 PROCEDURE — C9113 INJ PANTOPRAZOLE SODIUM, VIA: HCPCS | Performed by: HOSPITALIST

## 2020-08-18 PROCEDURE — 74011636637 HC RX REV CODE- 636/637: Performed by: HOSPITALIST

## 2020-08-18 PROCEDURE — 36415 COLL VENOUS BLD VENIPUNCTURE: CPT

## 2020-08-18 PROCEDURE — 85018 HEMOGLOBIN: CPT

## 2020-08-18 PROCEDURE — 0DJ08ZZ INSPECTION OF UPPER INTESTINAL TRACT, VIA NATURAL OR ARTIFICIAL OPENING ENDOSCOPIC: ICD-10-PCS | Performed by: INTERNAL MEDICINE

## 2020-08-18 PROCEDURE — 82962 GLUCOSE BLOOD TEST: CPT

## 2020-08-18 PROCEDURE — 88312 SPECIAL STAINS GROUP 1: CPT

## 2020-08-18 PROCEDURE — 74011250636 HC RX REV CODE- 250/636: Performed by: NURSE ANESTHETIST, CERTIFIED REGISTERED

## 2020-08-18 PROCEDURE — 74011250636 HC RX REV CODE- 250/636: Performed by: INTERNAL MEDICINE

## 2020-08-18 PROCEDURE — 76060000031 HC ANESTHESIA FIRST 0.5 HR: Performed by: INTERNAL MEDICINE

## 2020-08-18 PROCEDURE — 65270000029 HC RM PRIVATE

## 2020-08-18 PROCEDURE — 77030021593 HC FCPS BIOP ENDOSC BSC -A: Performed by: INTERNAL MEDICINE

## 2020-08-18 PROCEDURE — 74011000250 HC RX REV CODE- 250: Performed by: HOSPITALIST

## 2020-08-18 PROCEDURE — 36430 TRANSFUSION BLD/BLD COMPNT: CPT

## 2020-08-18 PROCEDURE — 74011250637 HC RX REV CODE- 250/637: Performed by: HOSPITALIST

## 2020-08-18 PROCEDURE — 74011250636 HC RX REV CODE- 250/636: Performed by: ANESTHESIOLOGY

## 2020-08-18 PROCEDURE — 74011250636 HC RX REV CODE- 250/636: Performed by: HOSPITALIST

## 2020-08-18 PROCEDURE — 81001 URINALYSIS AUTO W/SCOPE: CPT

## 2020-08-18 RX ORDER — INSULIN GLARGINE 100 [IU]/ML
40 INJECTION, SOLUTION SUBCUTANEOUS
Status: DISCONTINUED | OUTPATIENT
Start: 2020-08-18 | End: 2020-08-19 | Stop reason: HOSPADM

## 2020-08-18 RX ORDER — LIDOCAINE HYDROCHLORIDE 20 MG/ML
INJECTION, SOLUTION EPIDURAL; INFILTRATION; INTRACAUDAL; PERINEURAL AS NEEDED
Status: DISCONTINUED | OUTPATIENT
Start: 2020-08-18 | End: 2020-08-18 | Stop reason: HOSPADM

## 2020-08-18 RX ORDER — MORPHINE SULFATE 2 MG/ML
2 INJECTION, SOLUTION INTRAMUSCULAR; INTRAVENOUS ONCE
Status: COMPLETED | OUTPATIENT
Start: 2020-08-18 | End: 2020-08-18

## 2020-08-18 RX ORDER — PROPOFOL 10 MG/ML
INJECTION, EMULSION INTRAVENOUS AS NEEDED
Status: DISCONTINUED | OUTPATIENT
Start: 2020-08-18 | End: 2020-08-18 | Stop reason: HOSPADM

## 2020-08-18 RX ORDER — SODIUM CHLORIDE 9 MG/ML
125 INJECTION, SOLUTION INTRAVENOUS CONTINUOUS
Status: DISCONTINUED | OUTPATIENT
Start: 2020-08-18 | End: 2020-08-18

## 2020-08-18 RX ORDER — INSULIN LISPRO 100 [IU]/ML
INJECTION, SOLUTION INTRAVENOUS; SUBCUTANEOUS
Status: DISCONTINUED | OUTPATIENT
Start: 2020-08-18 | End: 2020-08-19 | Stop reason: HOSPADM

## 2020-08-18 RX ORDER — PROPOFOL 10 MG/ML
INJECTION, EMULSION INTRAVENOUS
Status: DISCONTINUED | OUTPATIENT
Start: 2020-08-18 | End: 2020-08-18 | Stop reason: HOSPADM

## 2020-08-18 RX ORDER — HYDROCODONE BITARTRATE AND ACETAMINOPHEN 5; 325 MG/1; MG/1
1 TABLET ORAL
Status: DISCONTINUED | OUTPATIENT
Start: 2020-08-18 | End: 2020-08-19 | Stop reason: HOSPADM

## 2020-08-18 RX ORDER — SODIUM CHLORIDE, SODIUM LACTATE, POTASSIUM CHLORIDE, CALCIUM CHLORIDE 600; 310; 30; 20 MG/100ML; MG/100ML; MG/100ML; MG/100ML
1000 INJECTION, SOLUTION INTRAVENOUS CONTINUOUS
Status: DISCONTINUED | OUTPATIENT
Start: 2020-08-18 | End: 2020-08-18 | Stop reason: HOSPADM

## 2020-08-18 RX ADMIN — PROPOFOL 180 MCG/KG/MIN: 10 INJECTION, EMULSION INTRAVENOUS at 13:01

## 2020-08-18 RX ADMIN — MORPHINE SULFATE 2 MG: 2 INJECTION, SOLUTION INTRAMUSCULAR; INTRAVENOUS at 08:01

## 2020-08-18 RX ADMIN — LEVOTHYROXINE SODIUM 100 MCG: 0.1 TABLET ORAL at 06:07

## 2020-08-18 RX ADMIN — SODIUM CHLORIDE 40 MG: 9 INJECTION, SOLUTION INTRAMUSCULAR; INTRAVENOUS; SUBCUTANEOUS at 03:57

## 2020-08-18 RX ADMIN — Medication 10 ML: at 15:16

## 2020-08-18 RX ADMIN — SODIUM CHLORIDE 40 MG: 9 INJECTION, SOLUTION INTRAMUSCULAR; INTRAVENOUS; SUBCUTANEOUS at 18:19

## 2020-08-18 RX ADMIN — Medication 10 ML: at 21:56

## 2020-08-18 RX ADMIN — SODIUM CHLORIDE 125 ML/HR: 900 INJECTION, SOLUTION INTRAVENOUS at 15:10

## 2020-08-18 RX ADMIN — PRAVASTATIN SODIUM 40 MG: 20 TABLET ORAL at 21:55

## 2020-08-18 RX ADMIN — PROPOFOL 50 MG: 10 INJECTION, EMULSION INTRAVENOUS at 13:00

## 2020-08-18 RX ADMIN — INSULIN LISPRO 6 UNITS: 100 INJECTION, SOLUTION INTRAVENOUS; SUBCUTANEOUS at 15:15

## 2020-08-18 RX ADMIN — INSULIN LISPRO 9 UNITS: 100 INJECTION, SOLUTION INTRAVENOUS; SUBCUTANEOUS at 21:55

## 2020-08-18 RX ADMIN — INSULIN LISPRO 6 UNITS: 100 INJECTION, SOLUTION INTRAVENOUS; SUBCUTANEOUS at 09:26

## 2020-08-18 RX ADMIN — OCTREOTIDE ACETATE 50 MCG/HR: 500 INJECTION, SOLUTION INTRAVENOUS; SUBCUTANEOUS at 06:56

## 2020-08-18 RX ADMIN — SODIUM CHLORIDE, SODIUM LACTATE, POTASSIUM CHLORIDE, AND CALCIUM CHLORIDE 1000 ML: 600; 310; 30; 20 INJECTION, SOLUTION INTRAVENOUS at 12:38

## 2020-08-18 RX ADMIN — Medication 10 ML: at 06:08

## 2020-08-18 RX ADMIN — PHENYLEPHRINE HYDROCHLORIDE 100 MCG: 10 INJECTION INTRAVENOUS at 13:06

## 2020-08-18 RX ADMIN — LIDOCAINE HYDROCHLORIDE 60 MG: 20 INJECTION, SOLUTION EPIDURAL; INFILTRATION; INTRACAUDAL; PERINEURAL at 12:58

## 2020-08-18 RX ADMIN — INSULIN GLARGINE 40 UNITS: 100 INJECTION, SOLUTION SUBCUTANEOUS at 21:55

## 2020-08-18 RX ADMIN — SODIUM CHLORIDE 1000 ML: 900 INJECTION, SOLUTION INTRAVENOUS at 15:03

## 2020-08-18 RX ADMIN — INSULIN LISPRO 9 UNITS: 100 INJECTION, SOLUTION INTRAVENOUS; SUBCUTANEOUS at 01:14

## 2020-08-18 RX ADMIN — PROPOFOL 50 MG: 10 INJECTION, EMULSION INTRAVENOUS at 13:01

## 2020-08-18 RX ADMIN — ONDANSETRON 4 MG: 2 INJECTION INTRAMUSCULAR; INTRAVENOUS at 08:01

## 2020-08-18 NOTE — PROGRESS NOTES
Hospitalist Progress Note     Admit Date:  2020  3:30 PM   Name:  Supriya Farrell   Age:  76 y.o.  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 Pt reports taking NSAID for past 4 days for his back pain that got worse as he is moving his house. As per records, pt was taking Naprosyn BID along with flexeril. Pt denies any abdominal pain. He had 3 episodes of melena and 1 episode of hematemesis yesterday. Today, he had 2-3 episodes of melena. He was seen by his PCP, he had a near syncopal episode in ED parking lot. He feels weak, but denies lightheadedness/dizziness. He drinks 2-3 beers every on and off. He denies chest pain, SOB, urinary symptoms, fever, cough, headache. GI notified by ER physician. CBC showed Hb 7.9, MCV 66 (BASELINE Hb 12-13), Cr 1.24 with mild transaminitis. Stool guaiac positive in PCP's office.        patient seen and examined at bedside    Transfused 2U PRBC  s/p EGD which showed Non bleeding antral gastric ulcer, mild gastritis. No bleeding seen or old blood.  is the suspected source. Pending UA  Complains of persistent right sided abdominal pain. Will start norco for this  [email or web address]  Stop octreotide    Nursing notes and chart reviewed. Review of Systems negative with exception of pertinent positives noted above.       Objective:     Patient Vitals for the past 24 hrs:   Temp Pulse Resp BP SpO2   20 1408  86 14 130/74 100 %   20 1358  86 14 117/76 100 %   20 1347  86 12 123/67 99 %   20 1337  86 14 118/65 96 %   20 1328  85 14 109/67 94 %   08/18/20 1318  80 16 101/60 100 %   08/18/20 1257  96 18 141/90 98 %   08/18/20 1214 97.8 °F (36.6 °C) 92 20 118/69 96 %   08/18/20 0818 97 °F (36.1 °C) 77 20 128/84 97 %   08/18/20 0516 97.8 °F (36.6 °C) 84 20 140/88 96 %   08/18/20 0330 98.1 °F (36.7 °C) 80 18 151/78 97 %   08/18/20 0258 97.6 °F (36.4 °C) 90 18 136/89 98 %   08/18/20 0240 97.6 °F (36.4 °C) 87 18 139/85 98 %   08/18/20 0224 98 °F (36.7 °C) 91 19 124/72 98 %   08/18/20 0037 97.5 °F (36.4 °C) 93 18 123/67 93 %   08/17/20 2336 97.8 °F (36.6 °C) 97 20 116/79 100 %   08/17/20 2306 97.7 °F (36.5 °C) 97 20 137/79 100 %   08/17/20 2251 98.2 °F (36.8 °C) 96 18 111/75 99 %   08/17/20 2235 98.2 °F (36.8 °C) 94 19 116/78    08/17/20 2200 98 °F (36.7 °C) 99 18 128/74 100 %   08/17/20 2055 98.4 °F (36.9 °C) 96 18 124/79    08/17/20 2028 98.4 °F (36.9 °C) 92 18 110/73 94 %   08/17/20 1956 98.3 °F (36.8 °C) 90 18 119/60 94 %   08/17/20 1900 98.1 °F (36.7 °C) 97 17 126/81 96 %   08/17/20 1809  (!) 106      08/17/20 1808    124/83    08/17/20 1741    110/75    08/17/20 1727  (!) 107      08/17/20 1640    133/85    08/17/20 1538 97.8 °F (36.6 °C) (!) 108 16 104/62 95 %     Oxygen Therapy  O2 Sat (%): 100 % (08/18/20 1408)  Pulse via Oximetry: 87 beats per minute (08/18/20 1408)  O2 Device: Nasal cannula (08/18/20 1408)  O2 Flow Rate (L/min): 3 l/min (08/18/20 1408)    Intake/Output Summary (Last 24 hours) at 8/18/2020 1448  Last data filed at 8/18/2020 1303  Gross per 24 hour   Intake 1150 ml   Output 600 ml   Net 550 ml         General:    Well nourished. Alert. Wearing oxygen   CV:   RRR. No murmur, rub, or gallop. Lungs:   CTAB. No wheezing, rhonchi, or rales. Abdomen:   Soft, nontender, nondistended. Extremities: Warm and dry. No cyanosis or edema. Skin:     No rashes or jaundice. Data Review:  I have reviewed all labs, meds, telemetry events, and studies from the last 24 hours.     Recent Results (from the past 24 hour(s))   CBC WITH AUTOMATED DIFF    Collection Time: 08/17/20  3:43 PM   Result Value Ref Range    WBC 12.3 (H) 4.3 - 11.1 K/uL    RBC 3.94 (L) 4.23 - 5.6 M/uL    HGB 7.9 (L) 13.6 - 17.2 g/dL    HCT 26.1 (L) 41.1 - 50.3 %    MCV 66.2 (L) 79.6 - 97.8 FL    MCH 20.1 (L) 26.1 - 32.9 PG    MCHC 30.3 (L) 31.4 - 35.0 g/dL    RDW 17.8 (H) 11.9 - 14.6 %    PLATELET 489 928 - 745 K/uL    MPV 12.3 9.4 - 12.3 FL    ABSOLUTE NRBC 0.02 0.0 - 0.2 K/uL    DF AUTOMATED      NEUTROPHILS 67 43 - 78 %    LYMPHOCYTES 23 13 - 44 %    MONOCYTES 8 4.0 - 12.0 %    EOSINOPHILS 1 0.5 - 7.8 %    BASOPHILS 1 0.0 - 2.0 %    IMMATURE GRANULOCYTES 1 0.0 - 5.0 %    ABS. NEUTROPHILS 8.3 (H) 1.7 - 8.2 K/UL    ABS. LYMPHOCYTES 2.8 0.5 - 4.6 K/UL    ABS. MONOCYTES 1.0 0.1 - 1.3 K/UL    ABS. EOSINOPHILS 0.1 0.0 - 0.8 K/UL    ABS. BASOPHILS 0.1 0.0 - 0.2 K/UL    ABS. IMM. GRANS. 0.1 0.0 - 0.5 K/UL   METABOLIC PANEL, COMPREHENSIVE    Collection Time: 08/17/20  3:43 PM   Result Value Ref Range    Sodium 138 136 - 145 mmol/L    Potassium 4.5 3.5 - 5.1 mmol/L    Chloride 103 98 - 107 mmol/L    CO2 28 21 - 32 mmol/L    Anion gap 7 7 - 16 mmol/L    Glucose 444 (H) 65 - 100 mg/dL    BUN 47 (H) 8 - 23 MG/DL    Creatinine 1.24 0.8 - 1.5 MG/DL    GFR est AA >60 >60 ml/min/1.73m2    GFR est non-AA >60 >60 ml/min/1.73m2    Calcium 8.5 8.3 - 10.4 MG/DL    Bilirubin, total 1.2 (H) 0.2 - 1.1 MG/DL    ALT (SGPT) 46 12 - 65 U/L    AST (SGOT) 20 15 - 37 U/L    Alk.  phosphatase 50 50 - 136 U/L    Protein, total 6.3 6.3 - 8.2 g/dL    Albumin 3.3 3.2 - 4.6 g/dL    Globulin 3.0 2.3 - 3.5 g/dL    A-G Ratio 1.1 (L) 1.2 - 3.5     TYPE & SCREEN    Collection Time: 08/17/20  3:43 PM   Result Value Ref Range    Crossmatch Expiration 08/20/2020     ABO/Rh(D) A POSITIVE     Antibody screen NEG     Unit number [id number]     Blood component type RC LR     Unit division 00     Status of unit TRANSFUSED     Crossmatch result Compatible     Unit number [id number]     Blood component type  LR     Unit division 00     Status of unit ISSUED     Crossmatch result Compatible     Unit number [id number]     Blood component type  LR     Unit division 00     Status of unit TRANSFUSED     Crossmatch result Compatible    LIPASE    Collection Time: 08/17/20  3:43 PM   Result Value Ref Range    Lipase 173 73 - 393 U/L   EKG, 12 LEAD, INITIAL    Collection Time: 08/17/20  4:16 PM   Result Value Ref Range    Ventricular Rate 99 BPM    Atrial Rate 99 BPM    P-R Interval 164 ms    QRS Duration 90 ms    Q-T Interval 336 ms    QTC Calculation (Bezet) 431 ms    Calculated P Axis 51 degrees    Calculated R Axis -14 degrees    Calculated T Axis 33 degrees    Diagnosis       !! AGE AND GENDER SPECIFIC ECG ANALYSIS !!   Normal sinus rhythm  Normal ECG  No previous ECGs available  Confirmed by REEMA BARNES (), IZABELA JOSEPH ([id number]) on 8/18/2020 9:04:45 AM     HGB & HCT    Collection Time: 08/17/20  6:04 PM   Result Value Ref Range    HGB 7.9 (L) 13.6 - 17.2 g/dL    HCT 25.6 (L) 41.1 - 50.3 %   IRON    Collection Time: 08/17/20  6:05 PM   Result Value Ref Range    Iron 290 (H) 35 - 150 ug/dL   FERRITIN    Collection Time: 08/17/20  6:05 PM   Result Value Ref Range    Ferritin 60 8 - 388 NG/ML   TRANSFERRIN SATURATION    Collection Time: 08/17/20  6:05 PM   Result Value Ref Range    Iron 293 (H) 35 - 150 ug/dL    TIBC 355 250 - 450 ug/dL    Transferrin Saturation 83 >20 %   VITAMIN B12    Collection Time: 08/17/20  6:05 PM   Result Value Ref Range    Vitamin B12 480 193 - 986 pg/mL   GLUCOSE, POC    Collection Time: 08/17/20  8:27 PM   Result Value Ref Range    Glucose (POC) 317 (H) 65 - 100 mg/dL   GLUCOSE, POC    Collection Time: 08/18/20 12:46 AM   Result Value Ref Range    Glucose (POC) 259 (H) 65 - 100 mg/dL   GLUCOSE, POC    Collection Time: 08/18/20  4:59 AM   Result Value Ref Range    Glucose (POC) 133 (H) 65 - 217 mg/dL   METABOLIC PANEL, COMPREHENSIVE    Collection Time: 08/18/20  6:44 AM   Result Value Ref Range    Sodium 147 (H) 136 - 145 mmol/L    Potassium 4.0 3.5 - 5.1 mmol/L    Chloride 113 (H) 98 - 107 mmol/L    CO2 30 21 - 32 mmol/L    Anion gap 4 (L) 7 - 16 mmol/L    Glucose 122 (H) 65 - 100 mg/dL    BUN 32 (H) 8 - 23 MG/DL    Creatinine 0.95 0.8 - 1.5 MG/DL    GFR est AA >60 >60 ml/min/1.73m2    GFR est non-AA >60 >60 ml/min/1.73m2    Calcium 8.0 (L) 8.3 - 10.4 MG/DL    Bilirubin, total 2.6 (H) 0.2 - 1.1 MG/DL    ALT (SGPT) 44 12 - 65 U/L    AST (SGOT) 34 15 - 37 U/L    Alk.  phosphatase 46 (L) 50 - 136 U/L    Protein, total 5.8 (L) 6.3 - 8.2 g/dL    Albumin 3.1 (L) 3.2 - 4.6 g/dL    Globulin 2.7 2.3 - 3.5 g/dL    A-G Ratio 1.1 (L) 1.2 - 3.5     HGB & HCT    Collection Time: 08/18/20  6:45 AM   Result Value Ref Range    HGB 9.6 (L) 13.6 - 17.2 g/dL    HCT 30.3 (L) 41.1 - 50.3 %   GLUCOSE, POC    Collection Time: 08/18/20  9:03 AM   Result Value Ref Range    Glucose (POC) 230 (H) 65 - 100 mg/dL   GLUCOSE, POC    Collection Time: 08/18/20 12:35 PM   Result Value Ref Range    Glucose (POC) 201 (H) 65 - 100 mg/dL        All Micro Results     None          Current Meds:  Current Facility-Administered Medications   Medication Dose Route Frequency    0.9% sodium chloride infusion 250 mL  250 mL IntraVENous PRN    levothyroxine (SYNTHROID) tablet 100 mcg  100 mcg Oral ACB    pravastatin (PRAVACHOL) tablet 40 mg  40 mg Oral QHS    sodium chloride (NS) flush 5-40 mL  5-40 mL IntraVENous Q8H    sodium chloride (NS) flush 5-40 mL  5-40 mL IntraVENous PRN    acetaminophen (TYLENOL) tablet 650 mg  650 mg Oral Q6H PRN    Or    acetaminophen (TYLENOL) suppository 650 mg  650 mg Rectal Q6H PRN    polyethylene glycol (MIRALAX) packet 17 g  17 g Oral DAILY PRN    promethazine (PHENERGAN) tablet 12.5 mg  12.5 mg Oral Q6H PRN    Or    ondansetron (ZOFRAN) injection 4 mg  4 mg IntraVENous Q4H PRN    potassium chloride 10 mEq in 100 ml IVPB  10 mEq IntraVENous PRN    magnesium sulfate 2 g/50 ml IVPB (premix or compounded)  2 g IntraVENous PRN    octreotide (SANDOSTATIN) 500 mcg in 0.9% sodium chloride 500 mL infusion  50 mcg/hr IntraVENous CONTINUOUS    pantoprazole (PROTONIX) 40 mg in 0.9% sodium chloride 10 mL injection  40 mg IntraVENous Q12H    insulin glargine (LANTUS) injection 25 Units  25 Units SubCUTAneous QHS    insulin lispro (HUMALOG) injection   SubCUTAneous Q4H    0.9% sodium chloride infusion 250 mL  250 mL IntraVENous PRN       Other Studies (last 24 hours):  [employer]    Result Date: 8/17/2020  Right Upper Quadrant Ultrasound INDICATION: Cirrhosis, acute GI bleeding, anemia, hyperglycemia History includes pancreatic mass, left renal mass, heterogeneous liver with suspected fatty infiltration, splenomegaly. COMPARISON: Abdomen MRI 2/8/2020 and ultrasound abdomen complete 1/20/2020 TECHNIQUE:  Sonographic gray scale and Doppler images were obtained of the right upper quadrant of the abdomen. FINDINGS: Liver parenchymal echotexture is moderately heterogeneous throughout which can limit the sensitivity for masses. There are no discrete lesions in the visualized portions of the liver. Liver size is 23.0 cm, similar to previous MRI. Main portal vein is patent on Doppler imaging. There is no bile duct dilatation. The common bile duct diameter is 3-4 mm. The gallbladder is normal in size and appearance. No gallstones are seen. There is no wall thickening. Sonographic Astorga's sign is not seen. Barbara arising from or abutting the pancreas are several cystic and solid masses. The most prominent cystic mass is similar to previous MRI given technique, and measures up to 2.2 cm. Immediately adjacent to this is a complex mass which is mostly solid and measures up to 3.9 x 3.6 x 2.6 cm, with mild increased color Doppler flow. The right kidney measures 13.9 cm in length. There is no hydronephrosis. A cyst with thin linear septation is noted in the upper pole measuring up to 1.9 cm diameter.  There is no evidence of a solid renal mass. There is no evidence of ascites. The aorta and IVC are patent on Doppler imaging. Aortic diameter is within normal limits. IMPRESSION: 1. Upper abdominal masses within or adjacent to pancreas, suspicious for malignancy. 2. Enlarged liver, with fatty infiltration. Assessment and Plan:     Hospital Problems as of 8/18/2020 Date Reviewed: 8/17/2020          Codes Class Noted - Resolved POA    Acute blood loss anemia ICD-10-CM: D62  ICD-9-CM: 285.1  8/17/2020 - Present Unknown        * (Principal) Upper GI bleed ICD-10-CM: K92.2  ICD-9-CM: 578.9  8/17/2020 - Present Unknown        Malignant neoplasm of left kidney (HCC) ICD-10-CM: C64.2  ICD-9-CM: 189.0  8/17/2020 - Present Unknown        Acute kidney injury (MARTIN) with acute tubular necrosis (ATN) ([facility] ICD-10-CM: N17.0  ICD-9-CM: 584.5  8/17/2020 - Present Unknown        Pancreatic mass ICD-10-CM: K86.89  ICD-9-CM: 577.8  5/15/2020 - Present Yes        Severe obesity ([facility] ICD-10-CM: E66.01  ICD-9-CM: 278.01  8/29/2019 - Present Yes        Mixed hyperlipidemia ICD-10-CM: E78.2  ICD-9-CM: 272.2  4/10/2018 - Present Yes        Diabetes mellitus type 2, insulin dependent ([facility] ICD-10-CM: E11.9, Z79.4  ICD-9-CM: 250.00, V58.67  Unknown - Present Yes        Postablative hypothyroidism ICD-10-CM: E89.0  ICD-9-CM: 244.1  3/8/2017 - Present Yes        Essential hypertension ICD-10-CM: I10  ICD-9-CM: 401.9  3/8/2017 - Present Yes              PLAN:    #Acute blood loss anemia  GI intervention appreciated - EGD on 8/18 - suspect  source. PPI. Avoid NSAIDs. Follow up 2 months  Follow up UA  transfuse as needed to maintain Hgb > 7  IVF. Hold octreotide for now    #MARTIN, resolved  Baseline 0.8, admission 1.24  Appears prerenal  Monitor BUN:Cr daily    #clear cell carcinoma of left kidney-upper abdominal mass seen near/adjacent to the pancreas.  No ascites  #DM2-SSI, lantus half dose until eating  #hypothyroidism-home meds  #HTN -home meds as tolerated    DC planning/Dispo:  Home when able  DVT ppx:  SCD for now    Signed:  Noemi Lamb MD

## 2020-08-18 NOTE — PROCEDURES
Endoscopic Gastroduodenoscopy Procedure Note    Indications:  Melena, GI blood loss anemia    Anesthesia/Sedation: MAC IV     Pre-Procedure Physical:    Current Facility-Administered Medications   Medication Dose Route Frequency    lactated Ringers infusion 1,000 mL  1,000 mL IntraVENous CONTINUOUS    0.9% sodium chloride infusion 250 mL  250 mL IntraVENous PRN    levothyroxine (SYNTHROID) tablet 100 mcg  100 mcg Oral ACB    pravastatin (PRAVACHOL) tablet 40 mg  40 mg Oral QHS    sodium chloride (NS) flush 5-40 mL  5-40 mL IntraVENous Q8H    sodium chloride (NS) flush 5-40 mL  5-40 mL IntraVENous PRN    acetaminophen (TYLENOL) tablet 650 mg  650 mg Oral Q6H PRN    Or    acetaminophen (TYLENOL) suppository 650 mg  650 mg Rectal Q6H PRN    polyethylene glycol (MIRALAX) packet 17 g  17 g Oral DAILY PRN    promethazine (PHENERGAN) tablet 12.5 mg  12.5 mg Oral Q6H PRN    Or    ondansetron (ZOFRAN) injection 4 mg  4 mg IntraVENous Q4H PRN    potassium chloride 10 mEq in 100 ml IVPB  10 mEq IntraVENous PRN    magnesium sulfate 2 g/50 ml IVPB (premix or compounded)  2 g IntraVENous PRN    octreotide (SANDOSTATIN) 500 mcg in 0.9% sodium chloride 500 mL infusion  50 mcg/hr IntraVENous CONTINUOUS    pantoprazole (PROTONIX) 40 mg in 0.9% sodium chloride 10 mL injection  40 mg IntraVENous Q12H    insulin glargine (LANTUS) injection 25 Units  25 Units SubCUTAneous QHS    insulin lispro (HUMALOG) injection   SubCUTAneous Q4H    0.9% sodium chloride infusion 250 mL  250 mL IntraVENous PRN      Patient has no known allergies.     Patient Vitals for the past 8 hrs:   BP Temp Pulse Resp SpO2 Height Weight   08/18/20 1214 118/69 97.8 °F (36.6 °C) 92 20 96 % 5' 9\" (1.753 m) 122.5 kg (270 lb)   08/18/20 0818 128/84 97 °F (36.1 °C) 77 20 97 %     08/18/20 0516 140/88 97.8 °F (36.6 °C) 84 20 96 %         Exam      Airway: clear   Heart: normal S1and S2    Lungs: clear bilateral  Abdomen: soft, nontender, bowel sounds present and normal in all quads   Mental Status: awake, alert and oriented to person, place and time          Procedure Details     Informed consent was obtained for the procedure, including conscious sedation. Risks of pancreatitis, infection, perforation, hemorrhage, adverse drug reaction and aspiration were discussed. The patient was placed in the left lateral decubitus position. Based on the pre-procedure assessment, including review of the patient's medical history, medications, allergies, and review of systems, he had been deemed to be an appropriate candidate for conscious sedation; he was therefore sedated with the medications listed below. He was monitored continuously with ECG tracing, pulse oximetry, blood pressure monitoring, and direct observation. The EGD gastroscope was inserted into the mouth and advanced under direct vision to the second portion of the duodenum. A careful inspection was made as the gastroscope was withdrawn, including a retroflexed view of the proximal stomach; findings and interventions are described below. Appropriate photodocumentation was obtained. Findings:   Esophagus- Normal. Z line was slightly irregular. Stomach- No blood old or new was seen. On the anterior wall of the distal antrum was a moderate sized ulcer. Edematous folds surrounding it and difficult to see all aspects. No definite vessels seen. Duodenum- Normal.    Therapies: None    Specimens: Random gastric biopsies    Estimated Blood Loss: Minimal           Complications:   None; patient tolerated the procedure well. Attending Attestation:  I performed the procedure. Impression:  Non bleeding antral gastric ulcer, mild gastritis. No bleeding seen or old blood.  is the suspected source. Recommendations:  F/U biopsies, avoid NSAIDs, and continue PPIs. Repeat EGD in two months.     Magnolia Lo MD

## 2020-08-18 NOTE — H&P
Date of Surgery Update:  Zuleika Torres was seen and examined. History and physical has been reviewed. The patient has been examined.  There have been no significant clinical changes since the completion of the originally dated History and Physical.    Signed By: Ashley Valente MD     August 18, 2020 12:47 PM

## 2020-08-18 NOTE — PROGRESS NOTES
TRANSFER - OUT REPORT:    Verbal report given to [address], RN(name) on Tico Hardy  being transferred to [facility](unit) for routine post - op       Report consisted of patients Situation, Background, Assessment and   Recommendations(SBAR). Information from the following report(s) SBAR was reviewed with the receiving nurse. Lines:   Peripheral IV 08/17/20 Right Antecubital (Active)   Site Assessment Clean, dry, & intact 08/18/20 1230   Phlebitis Assessment 0 08/18/20 1230   Infiltration Assessment 0 08/18/20 1230   Dressing Status Clean, dry, & intact 08/18/20 1230   Dressing Type Tape;Transparent 08/18/20 1230   Hub Color/Line Status Pink; Infusing 08/18/20 1230   Alcohol Cap Used No 08/18/20 0715       Peripheral IV 08/17/20 Left Antecubital (Active)   Site Assessment Clean, dry, & intact 08/18/20 1230   Phlebitis Assessment 0 08/18/20 1230   Infiltration Assessment 0 08/18/20 1230   Dressing Status Clean, dry, & intact 08/18/20 1230   Dressing Type Tape;Transparent 08/18/20 1230   Hub Color/Line Status Pink; Infusing;Flushed 08/18/20 1230   Alcohol Cap Used No 08/18/20 0715        Opportunity for questions and clarification was provided.       Patient transported with:   O2 @ 3 liters

## 2020-08-18 NOTE — PROGRESS NOTES
TRANSFER - OUT REPORT:    Verbal report given to Titus(name) on Gil Sierra  being transferred to GI Lab(unit) for routine progression of care       Report consisted of patients Situation, Background, Assessment and   Recommendations(SBAR). Information from the following report(s) SBAR, Kardex, Intake/Output and MAR was reviewed with the receiving nurse. Lines:   Peripheral IV 08/17/20 Right Antecubital (Active)   Site Assessment Clean, dry, & intact 08/18/20 0300   Phlebitis Assessment 0 08/18/20 0300   Infiltration Assessment 0 08/18/20 0300   Dressing Status Clean, dry, & intact 08/18/20 0300   Dressing Type Tape;Transparent 08/18/20 0300   Hub Color/Line Status Infusing 08/18/20 0300   Alcohol Cap Used No 08/18/20 0300       Peripheral IV 08/17/20 Left Antecubital (Active)   Site Assessment Clean, dry, & intact 08/18/20 0300   Phlebitis Assessment 0 08/18/20 0300   Infiltration Assessment 0 08/18/20 0300   Dressing Status Clean, dry, & intact 08/18/20 0300   Dressing Type Tape;Transparent 08/18/20 0300   Hub Color/Line Status Infusing 08/18/20 0300   Alcohol Cap Used No 08/18/20 0300        Opportunity for questions and clarification was provided.       Patient transported with:   Yekra

## 2020-08-18 NOTE — PROGRESS NOTES
TRANSFER - IN REPORT:    Verbal report received on Dennise West  being received from 6th floor(unit) for ordered procedure      Report consisted of patients Situation, Background, Assessment and   Recommendations(SBAR). Information from the following report(s) SBAR, Kardex, Intake/Output, MAR and Recent Results was reviewed with the receiving nurse. Opportunity for questions and clarification was provided. Assessment completed upon patients arrival to unit and care assumed.

## 2020-08-18 NOTE — PROGRESS NOTES
Hourly rounds completed. Pt received 3 units of PRBCs with no adverse effects. V/S taken and recorded, pt tolerated. For H&H post Blood transfusion. Latest . Pt resting in bed. Will continue to monitor.

## 2020-08-18 NOTE — ANESTHESIA PREPROCEDURE EVALUATION
Relevant Problems   No relevant active problems       Anesthetic History   No history of anesthetic complications       Comments: Slow to awaken with cysto May 2020     Review of Systems / Medical History  Patient summary reviewed and pertinent labs reviewed    Pulmonary        Sleep apnea (s/p UPPP, does not wear CPAP and wife reports very heavy snoring)           Neuro/Psych   Within defined limits           Cardiovascular    Hypertension: well controlled              Exercise tolerance: >4 METS     GI/Hepatic/Renal     GERD: well controlled    Renal disease (Renal mass)      Comments: Cystic pancreatic mass    GIB Endo/Other    Diabetes: poorly controlled, type 2, using insulin  Hypothyroidism: well controlled  Morbid obesity, arthritis and anemia (Hb 9.6)     Other Findings            Physical Exam    Airway  Mallampati: II  TM Distance: > 6 cm  Neck ROM: normal range of motion   Mouth opening: Normal     Cardiovascular    Rhythm: regular  Rate: normal         Dental  No notable dental hx       Pulmonary  Breath sounds clear to auscultation               Abdominal         Other Findings            Anesthetic Plan    ASA: 3  Anesthesia type: total IV anesthesia          Induction: Intravenous  Anesthetic plan and risks discussed with: Patient

## 2020-08-18 NOTE — PROGRESS NOTES
1230 Report received from [address], RN  Pt in GI lab for EGD.    1300 Pt has arrived back to the floor. No n/v at this time and no pain. Dr Pretty Bloom ordering to stop octreotide and start NS at 125. Hungry; sandwich plate provided. Wife [place] at bedside. UA sent to lab per orders. 1500 UA results available. Updated  according to GI lab. Covered with humalog per orders. FSBS changed to ACHS. Disregard 1800 scheduled per Dr Pretty Bloom.      1905 Report given to Pascale Montes

## 2020-08-18 NOTE — ANESTHESIA POSTPROCEDURE EVALUATION
Procedure(s):  ESOPHAGOGASTRODUODENOSCOPY (EGD) /40/ Room 637  ESOPHAGOGASTRODUODENAL (EGD) BIOPSY. total IV anesthesia    Anesthesia Post Evaluation      Multimodal analgesia: multimodal analgesia used between 6 hours prior to anesthesia start to PACU discharge  Patient location during evaluation: bedside  Patient participation: complete - patient participated  Level of consciousness: awake and alert  Pain score: 0  Pain management: adequate  Airway patency: patent  Anesthetic complications: no  Cardiovascular status: acceptable and hemodynamically stable  Respiratory status: acceptable  Hydration status: acceptable  Post anesthesia nausea and vomiting:  none  Final Post Anesthesia Temperature Assessment:  Normothermia (36.0-37.5 degrees C)      INITIAL Post-op Vital signs:   Vitals Value Taken Time   /60 8/18/2020  1:18 PM   Temp     Pulse 82 8/18/2020  1:21 PM   Resp 16 8/18/2020  1:18 PM   SpO2 97 % 8/18/2020  1:21 PM   Vitals shown include unvalidated device data.

## 2020-08-18 NOTE — PROGRESS NOTES
Pt c/o severe pain to his stomach. messaged Dr. Angeline Matos, ordered 2 mg of morphine.         0830  Pt notified RN his pain got better after he got morphine.

## 2020-08-19 VITALS
BODY MASS INDEX: 39.99 KG/M2 | OXYGEN SATURATION: 100 % | WEIGHT: 270 LBS | TEMPERATURE: 98.6 F | RESPIRATION RATE: 18 BRPM | HEIGHT: 69 IN | SYSTOLIC BLOOD PRESSURE: 102 MMHG | DIASTOLIC BLOOD PRESSURE: 67 MMHG | HEART RATE: 83 BPM

## 2020-08-19 LAB
ABO + RH BLD: NORMAL
ALBUMIN SERPL-MCNC: 2.8 G/DL (ref 3.2–4.6)
ALBUMIN/GLOB SERPL: 1 {RATIO} (ref 1.2–3.5)
ALP SERPL-CCNC: 41 U/L (ref 50–136)
ALT SERPL-CCNC: 81 U/L (ref 12–65)
ANION GAP SERPL CALC-SCNC: 6 MMOL/L (ref 7–16)
AST SERPL-CCNC: 84 U/L (ref 15–37)
BASOPHILS # BLD: 0 K/UL (ref 0–0.2)
BASOPHILS NFR BLD: 0 % (ref 0–2)
BILIRUB SERPL-MCNC: 1.3 MG/DL (ref 0.2–1.1)
BLD PROD TYP BPU: NORMAL
BLOOD GROUP ANTIBODIES SERPL: NORMAL
BPU ID: NORMAL
BUN SERPL-MCNC: 19 MG/DL (ref 8–23)
CALCIUM SERPL-MCNC: 7.4 MG/DL (ref 8.3–10.4)
CHLORIDE SERPL-SCNC: 111 MMOL/L (ref 98–107)
CK SERPL-CCNC: 70 U/L (ref 21–215)
CO2 SERPL-SCNC: 29 MMOL/L (ref 21–32)
CREAT SERPL-MCNC: 0.89 MG/DL (ref 0.8–1.5)
CROSSMATCH RESULT,%XM: NORMAL
DIFFERENTIAL METHOD BLD: ABNORMAL
EOSINOPHIL # BLD: 0.2 K/UL (ref 0–0.8)
EOSINOPHIL NFR BLD: 3 % (ref 0.5–7.8)
ERYTHROCYTE [DISTWIDTH] IN BLOOD BY AUTOMATED COUNT: 21.6 % (ref 11.9–14.6)
GLOBULIN SER CALC-MCNC: 2.7 G/DL (ref 2.3–3.5)
GLUCOSE BLD STRIP.AUTO-MCNC: 150 MG/DL (ref 65–100)
GLUCOSE BLD STRIP.AUTO-MCNC: 295 MG/DL (ref 65–100)
GLUCOSE SERPL-MCNC: 103 MG/DL (ref 65–100)
HCT VFR BLD AUTO: 26.9 % (ref 41.1–50.3)
HCT VFR BLD AUTO: 30.6 % (ref 41.1–50.3)
HGB BLD-MCNC: 8.4 G/DL (ref 13.6–17.2)
HGB BLD-MCNC: 9.3 G/DL (ref 13.6–17.2)
IMM GRANULOCYTES # BLD AUTO: 0.1 K/UL (ref 0–0.5)
IMM GRANULOCYTES NFR BLD AUTO: 1 % (ref 0–5)
LYMPHOCYTES # BLD: 1.6 K/UL (ref 0.5–4.6)
LYMPHOCYTES NFR BLD: 24 % (ref 13–44)
MCH RBC QN AUTO: 23 PG (ref 26.1–32.9)
MCHC RBC AUTO-ENTMCNC: 31.2 G/DL (ref 31.4–35)
MCV RBC AUTO: 73.7 FL (ref 79.6–97.8)
MONOCYTES # BLD: 0.4 K/UL (ref 0.1–1.3)
MONOCYTES NFR BLD: 6 % (ref 4–12)
NEUTS SEG # BLD: 4.5 K/UL (ref 1.7–8.2)
NEUTS SEG NFR BLD: 66 % (ref 43–78)
NRBC # BLD: 0.02 K/UL (ref 0–0.2)
PLATELET # BLD AUTO: 143 K/UL (ref 150–450)
PMV BLD AUTO: 11.2 FL (ref 9.4–12.3)
POTASSIUM SERPL-SCNC: 3.6 MMOL/L (ref 3.5–5.1)
PROT SERPL-MCNC: 5.5 G/DL (ref 6.3–8.2)
RBC # BLD AUTO: 3.65 M/UL (ref 4.23–5.6)
SODIUM SERPL-SCNC: 146 MMOL/L (ref 136–145)
SPECIMEN EXP DATE BLD: NORMAL
STATUS OF UNIT,%ST: NORMAL
UNIT DIVISION, %UDIV: 0
WBC # BLD AUTO: 6.8 K/UL (ref 4.3–11.1)

## 2020-08-19 PROCEDURE — 85025 COMPLETE CBC W/AUTO DIFF WBC: CPT

## 2020-08-19 PROCEDURE — 82550 ASSAY OF CK (CPK): CPT

## 2020-08-19 PROCEDURE — 80053 COMPREHEN METABOLIC PANEL: CPT

## 2020-08-19 PROCEDURE — 74011000250 HC RX REV CODE- 250: Performed by: HOSPITALIST

## 2020-08-19 PROCEDURE — C9113 INJ PANTOPRAZOLE SODIUM, VIA: HCPCS | Performed by: HOSPITALIST

## 2020-08-19 PROCEDURE — 74011636637 HC RX REV CODE- 636/637: Performed by: INTERNAL MEDICINE

## 2020-08-19 PROCEDURE — 36415 COLL VENOUS BLD VENIPUNCTURE: CPT

## 2020-08-19 PROCEDURE — 85018 HEMOGLOBIN: CPT

## 2020-08-19 PROCEDURE — 82962 GLUCOSE BLOOD TEST: CPT

## 2020-08-19 PROCEDURE — 74011250637 HC RX REV CODE- 250/637: Performed by: HOSPITALIST

## 2020-08-19 PROCEDURE — 74011250636 HC RX REV CODE- 250/636: Performed by: HOSPITALIST

## 2020-08-19 RX ORDER — PANTOPRAZOLE SODIUM 40 MG/1
40 TABLET, DELAYED RELEASE ORAL 2 TIMES DAILY
Qty: 60 TAB | Refills: 0 | Status: SHIPPED | OUTPATIENT
Start: 2020-08-19 | End: 2020-09-18

## 2020-08-19 RX ADMIN — LEVOTHYROXINE SODIUM 100 MCG: 0.1 TABLET ORAL at 05:56

## 2020-08-19 RX ADMIN — SODIUM CHLORIDE 40 MG: 9 INJECTION, SOLUTION INTRAMUSCULAR; INTRAVENOUS; SUBCUTANEOUS at 05:55

## 2020-08-19 RX ADMIN — INSULIN LISPRO 9 UNITS: 100 INJECTION, SOLUTION INTRAVENOUS; SUBCUTANEOUS at 11:30

## 2020-08-19 RX ADMIN — Medication 10 ML: at 05:55

## 2020-08-19 RX ADMIN — Medication 10 ML: at 14:08

## 2020-08-19 RX ADMIN — INSULIN LISPRO 3 UNITS: 100 INJECTION, SOLUTION INTRAVENOUS; SUBCUTANEOUS at 07:30

## 2020-08-19 NOTE — PROGRESS NOTES
Chart reviewed by  for discharge planning. Met with pt and sposue at bedside yesterday afternoon. No needs identified at that time. Please consult  if any new issues arise.     Care Management Interventions  PCP Verified by CM: Yes( Dylan Gloria DO )  Mode of Transport at Discharge: (family)  Transition of Care Consult (CM Consult): Discharge Planning(Pt is insured with pharmacy benefits.  )  Discharge Durable Medical Equipment: No  Physical Therapy Consult: No  Occupational Therapy Consult: No  Speech Therapy Consult: No  Current Support Network: Lives with Spouse(Pt lives with spouse and normally manages his ADL's, drives, etc.)  Confirm Follow Up Transport: Self  Name of the Patient Representative Who was Provided with a Choice of Provider and Agrees with the Discharge Plan: pt  [address] Provided?: No  Discharge Location  Discharge Placement: Home

## 2020-08-19 NOTE — PROGRESS NOTES
..I have reviewed discharge instructions with this pt, he and family member verbalized understanding.

## 2020-08-19 NOTE — DISCHARGE SUMMARY
Hospitalist Discharge Summary     Admit Date:  2020  3:30 PM   Name:  Junior Cooper   Age:  76 y.o.  :  1955   MRN:  [id number]   PCP:  Mayra Lindsey DO  Treatment Team: Attending Provider: Eric Washington MD; Consulting Provider: Zaid Loya MD; Consulting Provider: Eric Washington MD; Utilization Review: Carmencita Jensen RN    Problem List for this Hospitalization:  Hospital Problems as of 2020 Date Reviewed: 2020          Codes Class Noted - Resolved POA    Acute blood loss anemia ICD-10-CM: D62  ICD-9-CM: 285.1  2020 - Present Unknown        * (Principal) Upper GI bleed ICD-10-CM: K92.2  ICD-9-CM: 578.9  2020 - Present Unknown        Malignant neoplasm of left kidney ([facility] ICD-10-CM: C64.2  ICD-9-CM: 189.0  2020 - Present Unknown        Acute kidney injury (MARTIN) with acute tubular necrosis (ATN) ([facility] ICD-10-CM: N17.0  ICD-9-CM: 584.5  2020 - Present Unknown        Pancreatic mass ICD-10-CM: K86.89  ICD-9-CM: 577.8  5/15/2020 - Present Yes        Severe obesity ([facility] ICD-10-CM: E66.01  ICD-9-CM: 278.01  2019 - Present Yes        Mixed hyperlipidemia ICD-10-CM: E78.2  ICD-9-CM: 272.2  4/10/2018 - Present Yes        Diabetes mellitus type 2, insulin dependent ([facility] ICD-10-CM: E11.9, Z79.4  ICD-9-CM: 250.00, V58.67  Unknown - Present Yes        Postablative hypothyroidism ICD-10-CM: E89.0  ICD-9-CM: 244.1  3/8/2017 - Present Yes        Essential hypertension ICD-10-CM: I10  ICD-9-CM: 401.9  3/8/2017 - Present Yes                Admission HPI from 2020:    \"Patient is a 76years old male with hx of thalassemia minor, DM-2, morbid obesity, HTN, dyslipidemia, 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 Pt reports taking NSAID for past 4 days for his back pain that got worse as he is moving his house.  As per records, pt was taking Naprosyn BID along with flexeril. Pt denies any abdominal pain. He had 3 episodes of melena and 1 episode of hematemesis yesterday. Today, he had 2-3 episodes of melena. He was seen by his PCP, he had a near syncopal episode in ED parking lot. He feels weak, but denies lightheadedness/dizziness. He drinks 2-3 beers every on and off. He denies chest pain, SOB, urinary symptoms, fever, cough, headache. GI notified by ER physician. CBC showed Hb 7.9, MCV 66 (BASELINE Hb 12-13), Cr 1.24 with mild transaminitis. Stool guaiac positive in PCP's office. \"    Hospital Course:  He was admitted for GI bleed. Gastroenterology was consulted. They performed EGD and found nonbleeding antral gastric ulcer with mild gastritis. No bleeding seen old blood. Possible  source of the bleed. He will go home on Protonix by mouth twice per day. We will follow-up with GI in 1 month. Avoid NSAIDs. He was transfused 3 units of packed red blood cells. He is hemodynamically stable discharge home. Recommendations  Follow-up gastroenterology in 1 month. Follow-up with your primary care physician within 1 to 2 weeks. Recommend rechecking hemoglobin. Discharge hemoglobin 9. Follow-up with your oncologist for left kidney cancer  Protonix p.o. twice daily for 1 month then once a day after  Avoid NSAIDs      Follow up instructions below. Plan was discussed with patient and wife. All questions answered. Patient was stable at time of discharge and was instructed to call or return if there are any concerns or recurrence of symptoms. Diagnostic Imaging/Tests:   [facility] [employer]    Result Date: 8/17/2020  Right Upper Quadrant Ultrasound INDICATION: Cirrhosis, acute GI bleeding, anemia, hyperglycemia History includes pancreatic mass, left renal mass, heterogeneous liver with suspected fatty infiltration, splenomegaly.  COMPARISON: Abdomen MRI 2/8/2020 and ultrasound abdomen complete 1/20/2020 TECHNIQUE:  Sonographic gray scale and Doppler images were obtained of the right upper quadrant of the abdomen. FINDINGS: Liver parenchymal echotexture is moderately heterogeneous throughout which can limit the sensitivity for masses. There are no discrete lesions in the visualized portions of the liver. Liver size is 23.0 cm, similar to previous MRI. Main portal vein is patent on Doppler imaging. There is no bile duct dilatation. The common bile duct diameter is 3-4 mm. The gallbladder is normal in size and appearance. No gallstones are seen. There is no wall thickening. Sonographic Astorga's sign is not seen. Barbara arising from or abutting the pancreas are several cystic and solid masses. The most prominent cystic mass is similar to previous MRI given technique, and measures up to 2.2 cm. Immediately adjacent to this is a complex mass which is mostly solid and measures up to 3.9 x 3.6 x 2.6 cm, with mild increased color Doppler flow. The right kidney measures 13.9 cm in length. There is no hydronephrosis. A cyst with thin linear septation is noted in the upper pole measuring up to 1.9 cm diameter. There is no evidence of a solid renal mass. There is no evidence of ascites. The aorta and IVC are patent on Doppler imaging. Aortic diameter is within normal limits. IMPRESSION: 1. Upper abdominal masses within or adjacent to pancreas, suspicious for malignancy. 2. Enlarged liver, with fatty infiltration. Echocardiogram results:  No results found for this visit on 08/17/20.       All Micro Results     None          Labs: Results:       BMP, Mg, Phos Recent Labs     08/19/20  0447 08/18/20  0644 08/17/20  1543   * 147* 138   K 3.6 4.0 4.5   * 113* 103   CO2 29 30 28   AGAP 6* 4* 7   BUN 19 32* 47*   CREA 0.89 0.95 1.24   CA 7.4* 8.0* 8.5   * 122* 444*      CBC Recent Labs     08/19/20  1216 08/19/20  0447 08/18/20  0645  08/17/20  1543   WBC  --  6.8  --   --  12.3*   RBC  --  3.65*  --   --  3.94*   HGB 9.3* 8.4* 9.6*   < > 7.9*   HCT 30.6* 26.9* 30.3*   < > 26.1*   PLT  --  143*  --   --  223   GRANS  --  66  --   --  67   LYMPH  --  24  --   --  23   EOS  --  3  --   --  1   MONOS  --  6  --   --  8   BASOS  --  0  --   --  1   IG  --  1  --   --  1   ANEU  --  4.5  --   --  8.3*   ABL  --  1.6  --   --  2.8   MARISELA  --  0.2  --   --  0.1   ABM  --  0.4  --   --  1.0   ABB  --  0.0  --   --  0.1   AIG  --  0.1  --   --  0.1    < > = values in this interval not displayed.       LFT Recent Labs     08/19/20  0447 08/18/20  0644 08/17/20  1543   ALT 81* 44 46   AP 41* 46* 50   TP 5.5* 5.8* 6.3   ALB 2.8* 3.1* 3.3   GLOB 2.7 2.7 3.0   AGRAT 1.0* 1.1* 1.1*      Cardiac Testing Lab Results   Component Value Date/Time    CK 70 08/19/2020 04:47 AM      Coagulation Tests No results found for: PTP, INR, APTT, INREXT   A1c Lab Results   Component Value Date/Time    Hemoglobin A1c 8.4 (H) 06/10/2020 10:39 AM    Hemoglobin A1c 9.4 (H) 03/09/2020 11:49 AM    Hemoglobin A1c 7.3 (H) 11/25/2019 08:26 AM      Lipid Panel Lab Results   Component Value Date/Time    Cholesterol, total 182 06/10/2020 10:39 AM    HDL Cholesterol 48 06/10/2020 10:39 AM    LDL, calculated 108 (H) 06/10/2020 10:39 AM    VLDL, calculated 26 06/10/2020 10:39 AM    Triglyceride 131 06/10/2020 10:39 AM      Thyroid Panel Lab Results   Component Value Date/Time    TSH 0.011 (L) 03/09/2020 11:49 AM    TSH 0.029 (L) 03/19/2018 07:50 AM        Most Recent UA Lab Results   Component Value Date/Time    Color YELLOW 08/18/2020 03:00 PM    Appearance CLEAR 08/18/2020 03:00 PM    Specific gravity 1.015 08/18/2020 03:00 PM    pH (UA) 5.5 08/18/2020 03:00 PM    Protein Negative 08/18/2020 03:00 PM    Glucose Negative 08/18/2020 03:00 PM    Ketone Negative 08/18/2020 03:00 PM    Bilirubin Negative 08/18/2020 03:00 PM    Blood LARGE (A) 08/18/2020 03:00 PM    Urobilinogen 0.2 08/18/2020 03:00 PM    Nitrites Negative 08/18/2020 03:00 PM    Leukocyte Esterase TRACE (A) 08/18/2020 03:00 PM        No Known Allergies  Immunization History   Administered Date(s) Administered    Pneumococcal Polysaccharide (PPSV-23) 09/13/2019       All Labs from Last 24 Hrs:  Recent Results (from the past 24 hour(s))   URINALYSIS W/ RFLX MICROSCOPIC    Collection Time: 08/18/20  3:00 PM   Result Value Ref Range    Color YELLOW      Appearance CLEAR      Specific gravity 1.015 1.001 - 1.023      pH (UA) 5.5 5.0 - 9.0      Protein Negative NEG mg/dL    Glucose Negative mg/dL    Ketone Negative NEG mg/dL    Bilirubin Negative NEG      Blood LARGE (A) NEG      Urobilinogen 0.2 0.2 - 1.0 EU/dL    Nitrites Negative NEG      Leukocyte Esterase TRACE (A) NEG      WBC 3-5 0 /hpf    RBC 10-20 0 /hpf    Epithelial cells 0-3 0 /hpf    Bacteria 0 0 /hpf    Mucus TRACE 0 /lpf    Other observations RESULTS VERIFIED MANUALLY     GLUCOSE, POC    Collection Time: 08/18/20  5:42 PM   Result Value Ref Range    Glucose (POC) 213 (H) 65 - 100 mg/dL   GLUCOSE, POC    Collection Time: 08/18/20  8:05 PM   Result Value Ref Range    Glucose (POC) 282 (H) 65 - 818 mg/dL   METABOLIC PANEL, COMPREHENSIVE    Collection Time: 08/19/20  4:47 AM   Result Value Ref Range    Sodium 146 (H) 136 - 145 mmol/L    Potassium 3.6 3.5 - 5.1 mmol/L    Chloride 111 (H) 98 - 107 mmol/L    CO2 29 21 - 32 mmol/L    Anion gap 6 (L) 7 - 16 mmol/L    Glucose 103 (H) 65 - 100 mg/dL    BUN 19 8 - 23 MG/DL    Creatinine 0.89 0.8 - 1.5 MG/DL    GFR est AA >60 >60 ml/min/1.73m2    GFR est non-AA >60 >60 ml/min/1.73m2    Calcium 7.4 (L) 8.3 - 10.4 MG/DL    Bilirubin, total 1.3 (H) 0.2 - 1.1 MG/DL    ALT (SGPT) 81 (H) 12 - 65 U/L    AST (SGOT) 84 (H) 15 - 37 U/L    Alk.  phosphatase 41 (L) 50 - 136 U/L    Protein, total 5.5 (L) 6.3 - 8.2 g/dL    Albumin 2.8 (L) 3.2 - 4.6 g/dL    Globulin 2.7 2.3 - 3.5 g/dL    A-G Ratio 1.0 (L) 1.2 - 3.5     CBC WITH AUTOMATED DIFF    Collection Time: 08/19/20  4:47 AM   Result Value Ref Range    WBC 6.8 4.3 - 11.1 K/uL    RBC 3.65 (L) 4.23 - 5.6 M/uL    HGB 8.4 (L) 13.6 - 17.2 g/dL HCT 26.9 (L) 41.1 - 50.3 %    MCV 73.7 (L) 79.6 - 97.8 FL    MCH 23.0 (L) 26.1 - 32.9 PG    MCHC 31.2 (L) 31.4 - 35.0 g/dL    RDW 21.6 (H) 11.9 - 14.6 %    PLATELET 650 (L) 114 - 450 K/uL    MPV 11.2 9.4 - 12.3 FL    ABSOLUTE NRBC 0.02 0.0 - 0.2 K/uL    DF AUTOMATED      NEUTROPHILS 66 43 - 78 %    LYMPHOCYTES 24 13 - 44 %    MONOCYTES 6 4.0 - 12.0 %    EOSINOPHILS 3 0.5 - 7.8 %    BASOPHILS 0 0.0 - 2.0 %    IMMATURE GRANULOCYTES 1 0.0 - 5.0 %    ABS. NEUTROPHILS 4.5 1.7 - 8.2 K/UL    ABS. LYMPHOCYTES 1.6 0.5 - 4.6 K/UL    ABS. MONOCYTES 0.4 0.1 - 1.3 K/UL    ABS. EOSINOPHILS 0.2 0.0 - 0.8 K/UL    ABS. BASOPHILS 0.0 0.0 - 0.2 K/UL    ABS. IMM. GRANS. 0.1 0.0 - 0.5 K/UL   CK    Collection Time: 08/19/20  4:47 AM   Result Value Ref Range    CK 70 21 - 215 U/L   GLUCOSE, POC    Collection Time: 08/19/20  8:12 AM   Result Value Ref Range    Glucose (POC) 150 (H) 65 - 100 mg/dL   GLUCOSE, POC    Collection Time: 08/19/20 10:54 AM   Result Value Ref Range    Glucose (POC) 295 (H) 65 - 100 mg/dL   HGB & HCT    Collection Time: 08/19/20 12:16 PM   Result Value Ref Range    HGB 9.3 (L) 13.6 - 17.2 g/dL    HCT 30.6 (L) 41.1 - 50.3 %       Discharge Exam:  Patient Vitals for the past 24 hrs:   Temp Pulse Resp BP SpO2   08/19/20 1115 98.6 °F (37 °C) 83 18 102/67 100 %   08/19/20 0808 97.8 °F (36.6 °C) 86 18 125/79 100 %   08/19/20 0517 98 °F (36.7 °C) 82 18 114/64 99 %   08/19/20 0004 97.8 °F (36.6 °C) 87 18 137/75 94 %   08/18/20 2002 97.7 °F (36.5 °C) 85 16 129/78 99 %   08/18/20 1450 97.3 °F (36.3 °C) 81 16 131/79 100 %     Oxygen Therapy  O2 Sat (%): 100 % (08/19/20 1115)  Pulse via Oximetry: 87 beats per minute (08/18/20 1408)  O2 Device: Nasal cannula (08/18/20 1408)  O2 Flow Rate (L/min): 3 l/min (08/18/20 1408)    Intake/Output Summary (Last 24 hours) at 8/19/2020 1428  Last data filed at 8/19/2020 0519  Gross per 24 hour   Intake 720 ml   Output 950 ml   Net -230 ml       General:    Well nourished. Alert.   No distress. Eyes:   Normal sclera. Extraocular movements intact. ENT:  Normocephalic, atraumatic. Moist mucous membranes  CV:   Regular rate and rhythm. No murmur, rub, or gallop. Lungs:  Clear to auscultation bilaterally. No wheezing, rhonchi, or rales. Abdomen: Soft, nontender, nondistended. Bowel sounds normal.   Extremities: Warm and dry. No cyanosis or edema. Neurologic: CN II-XII grossly intact. Sensation intact. Skin:     No rashes or jaundice. Psych:  Normal mood and affect. Discharge Info:   Current Discharge Medication List      START taking these medications    Details   pantoprazole (PROTONIX) 40 mg tablet Take 1 Tab by mouth two (2) times a day for 30 days. Qty: 60 Tab, Refills: 0         CONTINUE these medications which have NOT CHANGED    Details   naproxen (Naprosyn) 500 mg tablet Take 1 Tab by mouth two (2) times daily (with meals). Qty: 60 Tab, Refills: 0      cyclobenzaprine (FLEXERIL) 10 mg tablet Take 1 Tab by mouth nightly. Qty: 30 Tab, Refills: 0      pravastatin (PRAVACHOL) 40 mg tablet TAKE 1 TABLET NIGHTLY  Qty: 90 Tab, Refills: 1      SITagliptin-metFORMIN (JANUMET) 50-1,000 mg per tablet Take 1 Tab by mouth two (2) times daily (with meals). Qty: 180 Tab, Refills: 1      levothyroxine (SYNTHROID) 200 mcg tablet TAKE 1 TABLET DAILY BEFORE BREAKFAST  Qty: 90 Tab, Refills: 3      losartan (COZAAR) 25 mg tablet Take 1 Tab by mouth daily. Qty: 90 Tab, Refills: 3      ascorbic acid, vitamin C, (VITAMIN C) 250 mg tablet Take  by mouth. glucosamine-chondroitin (ARTHX) 500-400 mg cap Take 1 Cap by mouth daily. insulin glargine (LANTUS SOLOSTAR U-100 INSULIN) 100 unit/mL (3 mL) inpn 50 Units by SubCUTAneous route daily.   Qty: 15 Pen, Refills: 3      pen needle, diabetic (NOVOTWIST) 32 gauge x 1/5\" ndle INJECT UNDER THE SKIN ONCE DAILY  Qty: 100 Pen Needle, Refills: 3      glucose blood VI test strips (ONETOUCH ULTRA TEST) strip USE TO CHECK BLOOD SUGARS BID DX E11.9  Qty: 200 Strip, Refills: 3               Disposition: home    Activity: Activity as tolerated  Diet: DIET DIABETIC WITH OPTIONS Consistent Carb 1500-1600kcal; Soft Solids    Follow-up Appointments   Procedures    FOLLOW UP VISIT Appointment in: Other ([place] [facility]) Follow up with gastroenterology in 1 month for acute blood loss anemia. Follow-up with primary physician within 1 to 2 weeks for hospital follow-up for acute blood loss anemia. Recommend repeating h... Follow up with gastroenterology in 1 month for acute blood loss anemia. Follow-up with primary physician within 1 to 2 weeks for hospital follow-up for acute blood loss anemia. Recommend repeating hemoglobin. Discharge hemoglobin 9. Follow-up with your oncologist for their cell carcinoma of the left kidney as scheduled. Standing Status:   Standing     Number of Occurrences:   1     Order Specific Question:   Appointment in     Answer: Other (Specify)         Follow-up Information     Follow up With Specialties Details Why Contact Info    Helga Dumont DO Family Medicine In 2 weeks Hospital follow-up for GI bleed. Recommend rechecking hemoglobin. Discharge hemoglobin 9. [address] [employer] [address] Enedina Kendrick [address] [place] [address] [id number]      Ole Rosales MD Gastroenterology In 1 month Hospital follow-up for GI bleed. [address]            Time spent in patient discharge planning and coordination 35 minutes.     Signed:  Juan R Mensah MD

## 2020-08-19 NOTE — DISCHARGE INSTRUCTIONS
Patient Education        Gastrointestinal Bleeding: Care Instructions  Your Care Instructions     The digestive or gastrointestinal tract goes from the mouth to the anus. It is often called the GI tract. Bleeding can happen anywhere in the GI tract. It may be caused by an ulcer, an infection, or cancer. It may also be caused by medicines such as aspirin or ibuprofen. Light bleeding may not cause any symptoms at first. But if you continue to bleed for a while, you may feel very weak or tired. Sudden, heavy bleeding means you need to see a doctor right away. This kind of bleeding can be very dangerous. But it can usually be cured or controlled. The doctor may do some tests to find the cause of your bleeding. Follow-up care is a key part of your treatment and safety. Be sure to make and go to all appointments, and call your doctor if you are having problems. It's also a good idea to know your test results and keep a list of the medicines you take. How can you care for yourself at home? · Be safe with medicines. Take your medicines exactly as prescribed. Call your doctor if you think you are having a problem with your medicine. You will get more details on the specific medicines your doctor prescribes. · Do not take aspirin or other anti-inflammatory medicines, such as naproxen (Aleve) or ibuprofen (Advil, Motrin), without talking to your doctor first. Ask your doctor if it is okay to use acetaminophen (Tylenol). · Do not drink alcohol. · The bleeding may make you lose iron. So it's important to eat foods that have a lot of iron. These include red meat, shellfish, poultry, and eggs. They also include beans, raisins, whole-grain breads, and leafy green vegetables. If you want help planning meals, you can make an appointment with a dietitian. When should you call for help? [id number] anytime you think you may need emergency care. For example, call if:  · You have sudden, severe belly pain.   · You vomit blood or what looks like coffee grounds. · You passed out (lost consciousness). · Your stools are maroon or very bloody. Call your doctor now or seek immediate medical care if:  · You are dizzy or lightheaded, or you feel like you may faint. · Your stools are black and look like tar, or they have streaks of blood. · You have belly pain. · You vomit or have nausea. · You have trouble swallowing, or it hurts when you swallow. Watch closely for changes in your health, and be sure to contact your doctor if:  · You do not get better as expected. Where can you learn more? Go to http://mendoza-dwain.info/  Enter F981 in the search box to learn more about \"Gastrointestinal Bleeding: Care Instructions. \"  Current as of: June 26, 2019               Content Version: 12.5  © 3556-0442 [employer]. Care instructions adapted under license by [employer] (which disclaims liability or warranty for this information). If you have questions about a medical condition or this instruction, always ask your healthcare professional. [address] any warranty or liability for your use of this information.

## 2020-08-19 NOTE — PROGRESS NOTES
[facility] Progress Note         Admit Date:  8/17/2020  Today's Date:  8/19/2020    CC:  GIB    Subjective:     Patient doing well. No BMs overnight. No N/V or abdominal pain. Tolerates diet. Medications:   Current Facility-Administered Medications   Medication Dose Route Frequency    HYDROcodone-acetaminophen (NORCO) 5-325 mg per tablet 1 Tab  1 Tab Oral Q6H PRN    insulin glargine (LANTUS) injection 40 Units  40 Units SubCUTAneous QHS    insulin lispro (HUMALOG) injection   SubCUTAneous AC&HS    0.9% sodium chloride infusion 250 mL  250 mL IntraVENous PRN    levothyroxine (SYNTHROID) tablet 100 mcg  100 mcg Oral ACB    pravastatin (PRAVACHOL) tablet 40 mg  40 mg Oral QHS    sodium chloride (NS) flush 5-40 mL  5-40 mL IntraVENous Q8H    sodium chloride (NS) flush 5-40 mL  5-40 mL IntraVENous PRN    acetaminophen (TYLENOL) tablet 650 mg  650 mg Oral Q6H PRN    Or    acetaminophen (TYLENOL) suppository 650 mg  650 mg Rectal Q6H PRN    polyethylene glycol (MIRALAX) packet 17 g  17 g Oral DAILY PRN    promethazine (PHENERGAN) tablet 12.5 mg  12.5 mg Oral Q6H PRN    Or    ondansetron (ZOFRAN) injection 4 mg  4 mg IntraVENous Q4H PRN    potassium chloride 10 mEq in 100 ml IVPB  10 mEq IntraVENous PRN    magnesium sulfate 2 g/50 ml IVPB (premix or compounded)  2 g IntraVENous PRN    pantoprazole (PROTONIX) 40 mg in 0.9% sodium chloride 10 mL injection  40 mg IntraVENous Q12H    0.9% sodium chloride infusion 250 mL  250 mL IntraVENous PRN       Review of Systems:  ROS was obtained, with pertinent positives as listed above. No chest pain or SOB. Diet:  Dm diet    Objective:   Vitals:  Visit Vitals  /79 (BP 1 Location: Left arm, BP Patient Position: At rest)   Pulse 86   Temp 97.8 °F (36.6 °C)   Resp 18   Ht 5' 9\" (1.753 m)   Wt 122.5 kg (270 lb)   SpO2 99%   BMI 39.87 kg/m²     Intake/Output:  No intake/output data recorded.   08/17 1901 - 08/19 0700  In: 003 [P.O.:720; I.V.:150]  Out: 1550 [Urine:1550]  Exam:  General appearance: alert, cooperative, no distress      Data Review (Labs):    Recent Labs     08/19/20  0447 08/18/20  0645 08/18/20  0644 08/17/20  1804 08/17/20  1543   WBC 6.8  --   --   --  12.3*   HGB 8.4* 9.6*  --  7.9* 7.9*   HCT 26.9* 30.3*  --  25.6* 26.1*   *  --   --   --  223   MCV 73.7*  --   --   --  66.2*   *  --  147*  --  138   K 3.6  --  4.0  --  4.5   *  --  113*  --  103   CO2 29  --  30  --  28   BUN 19  --  32*  --  47*   CREA 0.89  --  0.95  --  1.24   CA 7.4*  --  8.0*  --  8.5   *  --  122*  --  444*   AP 41*  --  46*  --  50   ALT 81*  --  44  --  46   TBILI 1.3*  --  2.6*  --  1.2*   ALB 2.8*  --  3.1*  --  3.3   TP 5.5*  --  5.8*  --  6.3   LPSE  --   --   --   --  173       Assessment:     Principal Problem:    Upper GI bleed (8/17/2020)    Active Problems:    Diabetes mellitus type 2, insulin dependent (HCC) ()      Postablative hypothyroidism (3/8/2017)      Essential hypertension (3/8/2017)      Mixed hyperlipidemia (4/10/2018)      Severe obesity (HCC) (8/29/2019)      Pancreatic mass (5/15/2020)      Acute blood loss anemia (8/17/2020)      Malignant neoplasm of left kidney (HCC) (8/17/2020)      Acute kidney injury (MARTIN) with [facility] (8/17/2020)    Patient is a 76 y.o. male patient of Dr Cari De Dios with a history of IDDM, thyroid cancer status post thyroidectomy, obesity, JENNA and a benign pancreatic cyst status post EUS with aspiration is seen at the request of Dr. Dwain Evans in the emergency room for a gastrointestinal bleed. EGD by Dr. Zoya Mirza 8/18  Findings:   Esophagus- Normal. Z line was slightly irregular. Stomach- No blood old or new was seen. On the anterior wall of the distal antrum was a moderate sized ulcer. Edematous folds surrounding it and difficult to see all aspects. No definite vessels seen.   Duodenum- Normal.     Therapies: None     Specimens: Random gastric biopsies     Estimated Blood Loss: Minimal           Complications:   None; patient tolerated the procedure well. Attending Attestation:  I performed the procedure.      Impression:  Non bleeding antral gastric ulcer, mild gastritis. No bleeding seen or old blood.  is the suspected source. Plan:     Hgb stable in the 8s range. No further bleeding. Follow-up bxs results. Avoid NSAIDs. PPI po BID x1 month, then once daily thereafter. Repeat outpt [facility] EGD in 2 mos to document ulcer healing. Can go home from GI standpoint. Outpt FU in 1 month.         Connie Zavala PA-C  [facility]

## 2020-08-19 NOTE — PROGRESS NOTES
Pt is for discharge home today with no needs/supportive care orders recieved for CM at this time.   Care Management Interventions  PCP Verified by CM: Yes( Tan Bonner DO )  Mode of Transport at Discharge: (family)  Transition of Care Consult (CM Consult): Discharge Planning(Pt is insured with pharmacy benefits.  )  Discharge Durable Medical Equipment: No  Physical Therapy Consult: No  Occupational Therapy Consult: No  Speech Therapy Consult: No  Current Support Network: Lives with Spouse(Pt lives with spouse and normally manages his ADL's, drives, etc.)  Confirm Follow Up Transport: Self  Name of the Patient Representative Who was Provided with a Choice of Provider and Agrees with the Discharge Plan: pt  [address] Provided?: No  Discharge Location  Discharge Placement: Home

## 2020-08-20 ENCOUNTER — PATIENT OUTREACH (OUTPATIENT)
Dept: CASE MANAGEMENT | Age: 65
End: 2020-08-20

## 2020-08-20 NOTE — PROGRESS NOTES
Initial SHAD outreach attempt to patient's home/mobile number was unsuccessful. Left message to return call. Will attempt second outreach within 24 hours.

## 2020-08-21 ENCOUNTER — PATIENT OUTREACH (OUTPATIENT)
Dept: CASE MANAGEMENT | Age: 65
End: 2020-08-21

## 2020-08-21 NOTE — PROGRESS NOTES
Second SHAD outreach attempt made to patient's home/mobile number was unsuccessful. Left message to return call. Will attempt third outreach within 5 business days.

## 2020-08-24 ENCOUNTER — PATIENT OUTREACH (OUTPATIENT)
Dept: CASE MANAGEMENT | Age: 65
End: 2020-08-24

## 2020-08-24 NOTE — PROGRESS NOTES
Third SHAD outreach attempt made to home/mobile numbers. Left message to return calls. Unable to reach for [facility] program, will close case. Will reopen if call is returned.

## 2020-09-23 RX ORDER — CEFAZOLIN SODIUM/WATER 2 G/20 ML
2 SYRINGE (ML) INTRAVENOUS
Status: CANCELLED | OUTPATIENT
Start: 2020-09-23 | End: 2020-09-23

## 2020-09-24 ENCOUNTER — HOSPITAL ENCOUNTER (OUTPATIENT)
Dept: SURGERY | Age: 65
Discharge: HOME | End: 2020-09-24
Payer: MEDICARE

## 2020-09-24 VITALS
HEIGHT: 70 IN | OXYGEN SATURATION: 96 % | WEIGHT: 281.1 LBS | TEMPERATURE: 98.6 F | SYSTOLIC BLOOD PRESSURE: 152 MMHG | RESPIRATION RATE: 16 BRPM | BODY MASS INDEX: 40.24 KG/M2 | HEART RATE: 82 BPM | DIASTOLIC BLOOD PRESSURE: 87 MMHG

## 2020-09-24 LAB
ANION GAP SERPL CALC-SCNC: 4 MMOL/L (ref 7–16)
BUN SERPL-MCNC: 13 MG/DL (ref 8–23)
CALCIUM SERPL-MCNC: 8.2 MG/DL (ref 8.3–10.4)
CHLORIDE SERPL-SCNC: 108 MMOL/L (ref 98–107)
CO2 SERPL-SCNC: 30 MMOL/L (ref 21–32)
CREAT SERPL-MCNC: 0.87 MG/DL (ref 0.8–1.5)
ERYTHROCYTE [DISTWIDTH] IN BLOOD BY AUTOMATED COUNT: 18.8 % (ref 11.9–14.6)
GLUCOSE SERPL-MCNC: 210 MG/DL (ref 65–100)
HCT VFR BLD AUTO: 34.7 % (ref 41.1–50.3)
HGB BLD-MCNC: 10.5 G/DL (ref 13.6–17.2)
MCH RBC QN AUTO: 21.6 PG (ref 26.1–32.9)
MCHC RBC AUTO-ENTMCNC: 30.3 G/DL (ref 31.4–35)
MCV RBC AUTO: 71.3 FL (ref 79.6–97.8)
NRBC # BLD: 0 K/UL (ref 0–0.2)
PLATELET # BLD AUTO: 229 K/UL (ref 150–450)
PMV BLD AUTO: ABNORMAL FL (ref 9.4–12.3)
POTASSIUM SERPL-SCNC: 4.3 MMOL/L (ref 3.5–5.1)
RBC # BLD AUTO: 4.87 M/UL (ref 4.23–5.6)
SODIUM SERPL-SCNC: 142 MMOL/L (ref 136–145)
WBC # BLD AUTO: 6.8 K/UL (ref 4.3–11.1)

## 2020-09-24 PROCEDURE — 85027 COMPLETE CBC AUTOMATED: CPT

## 2020-09-24 PROCEDURE — 80048 BASIC METABOLIC PNL TOTAL CA: CPT

## 2020-09-24 RX ORDER — INSULIN GLARGINE 100 [IU]/ML
50 INJECTION, SOLUTION SUBCUTANEOUS DAILY
COMMUNITY
End: 2020-11-18 | Stop reason: SDUPTHER

## 2020-09-24 RX ORDER — LEVOTHYROXINE SODIUM 200 UG/1
300 TABLET ORAL
COMMUNITY
End: 2020-11-18 | Stop reason: SDUPTHER

## 2020-09-24 RX ORDER — LEVOTHYROXINE SODIUM 100 UG/1
100 TABLET ORAL
Status: ON HOLD | COMMUNITY
End: 2020-10-01

## 2020-09-24 RX ORDER — PRAVASTATIN SODIUM 40 MG/1
40 TABLET ORAL DAILY
COMMUNITY
End: 2020-11-18 | Stop reason: SDUPTHER

## 2020-09-24 NOTE — PERIOP NOTES
Enhanced Recovery After Surgery: Diabetic patients    Drink Glucerna- one bottle twice daily for five days beginning on 09/24/2020 and stopping two days prior to surgery on 09/28/2020. (Drink last Glucerna on 09/28/2020.) If Glucerna is not available, drink 1/2 bottle of Ensure Enlive four times daily. Do not drink any Glucerna (or Ensure Enlive) the day before surgery. It is recommended that you continue drinking this for one month after surgery if ok with your physician. Follow your surgeon's instructions regarding diet in the days leading up to your surgery and regarding any bowel preparation. The night before surgery 09/30/2020 , drink two bottles of the Ensure Pre-Surgery drink. Drink nothing else after drinking the Ensure Pre-surgery drink the morning of surgery. Bring your patient handbook with you to the hospital.      Things to remember:    1. You will be up on a chair the evening of surgery and drinking clear liquids. Your diet will be advanced by your surgeon as appropriate. 2. Beginning the day after surgery, you will be up in a chair for all meals. 3. Beginning the day after surgery, you will be out of the bed for a minimum of 6 hours (not all at one time)    4. Beginning the day after surgery, you will walk in the hong in the hong at least 50' at least three times a day. 5. You will be given scheduled non-narcotic pain medication to help keep your pain under control. You will have stronger pain medication ordered for break through pain. 6. All of these measures are geared toward returning your bowel to normal function as soon as possible and to prevent complications associated with bowel blockage, blood clots, and/or pneumonia.

## 2020-09-24 NOTE — PERIOP NOTES
Patient verified name and     Order for consent found in EHR and matches case posting; patient verified. Reviewed consent in chart with Charleen Langston in scheduling. Type 3 surgery, Walk-in assessment complete. Labs per surgeon: CBC, BMP; results pending  Labs per anesthesia protocol: T/S, BS; results   EKG: EKG 2020    [address] [facility] ERAS nurse reviewed ERAS instructions along with clear liquid diet the morning of surgery and provided booklet to pt. [facility] nurse reviewed hemoglobin A1c and instructed pt not to drink ensure pre-surgery the morning of surgery. Covid swab 2020 at [address] approved surgical skin cleanser and instructions given per hospital policy. Patient provided with and instructed on educational handouts including Guide to Surgery, Pain Management, Hand Hygiene, Blood Transfusion Education, and [facility] Brochure. Patient answered medical/surgical history questions at their best of ability. All prior to admission medications documented in [facility]. Original medication prescription bottle were not visualized during patient appointment. Patient instructed to hold all vitamins 7 days prior to surgery and NSAIDS 5 days prior to surgery, patient verbalized understanding. Patient teach back successful and patient demonstrates knowledge of instructions.

## 2020-09-24 NOTE — PERIOP NOTES
PLEASE CONTINUE TAKING ALL PRESCRIPTION MEDICATIONS UP TO THE DAY OF SURGERY UNLESS OTHERWISE DIRECTED BELOW. DISCONTINUE all vitamins and supplements 7 days prior to surgery. DISCONTINUE Non-Steriodal Anti-Inflammatory (NSAIDS) such as Advil and Aleve 5 days prior to surgery. Home Medications to take  the day of surgery      Lantus Insulin- take 40 units the morning of surgery. Levothyroxine (Synthroid)     Pravastatin (Pravachol)      Home Medications   to Hold     Vitamin C     Glucosamine     Comments         Cassia-hex shower the night before surgery and the morning of surgery. *Visitor policy of 1 visitor per patient discussed. Please do not bring home medications with you on the day of surgery unless otherwise directed by your nurse. If you are instructed to bring home medications, please give them to your nurse as they will be administered by the nursing staff. If you have any questions, please call [facility] Mariam De Alissa [phone] or [facility] [phone]. A copy of this note was provided to the patient for reference.

## 2020-09-24 NOTE — PERIOP NOTES
Recent Results (from the past 12 hour(s))   CBC W/O DIFF    Collection Time: 09/24/20 11:00 AM   Result Value Ref Range    WBC 6.8 4.3 - 11.1 K/uL    RBC 4.87 4.23 - 5.6 M/uL    HGB 10.5 (L) 13.6 - 17.2 g/dL    HCT 34.7 (L) 41.1 - 50.3 %    MCV 71.3 (L) 79.6 - 97.8 FL    MCH 21.6 (L) 26.1 - 32.9 PG    MCHC 30.3 (L) 31.4 - 35.0 g/dL    RDW 18.8 (H) 11.9 - 14.6 %    PLATELET 498 069 - 412 K/uL    MPV Unable to calculate. Recommend adding IPF.  9.4 - 12.3 FL    ABSOLUTE NRBC 0.00 0.0 - 0.2 K/uL   METABOLIC PANEL, BASIC    Collection Time: 09/24/20 11:00 AM   Result Value Ref Range    Sodium 142 136 - 145 mmol/L    Potassium 4.3 3.5 - 5.1 mmol/L    Chloride 108 (H) 98 - 107 mmol/L    CO2 30 21 - 32 mmol/L    Anion gap 4 (L) 7 - 16 mmol/L    Glucose 210 (H) 65 - 100 mg/dL    BUN 13 8 - 23 MG/DL    Creatinine 0.87 0.8 - 1.5 MG/DL    GFR est AA >60 >60 ml/min/1.73m2    GFR est non-AA >60 >60 ml/min/1.73m2    Calcium 8.2 (L) 8.3 - 10.4 MG/DL

## 2020-09-30 ENCOUNTER — ANESTHESIA EVENT (OUTPATIENT)
Dept: SURGERY | Age: 65
DRG: 658 | End: 2020-09-30
Payer: MEDICARE

## 2020-10-01 ENCOUNTER — HOSPITAL ENCOUNTER (INPATIENT)
Age: 65
LOS: 2 days | Discharge: HOME | DRG: 658 | End: 2020-10-03
Attending: UROLOGY | Admitting: UROLOGY
Payer: MEDICARE

## 2020-10-01 ENCOUNTER — ANESTHESIA (OUTPATIENT)
Dept: SURGERY | Age: 65
DRG: 658 | End: 2020-10-01
Payer: MEDICARE

## 2020-10-01 DIAGNOSIS — N28.89 RENAL MASS, LEFT: Primary | ICD-10-CM

## 2020-10-01 LAB
ABO + RH BLD: NORMAL
BLOOD GROUP ANTIBODIES SERPL: NORMAL
GLUCOSE BLD STRIP.AUTO-MCNC: 165 MG/DL (ref 65–100)
GLUCOSE BLD STRIP.AUTO-MCNC: 224 MG/DL (ref 65–100)
GLUCOSE BLD STRIP.AUTO-MCNC: 234 MG/DL (ref 65–100)
GLUCOSE BLD STRIP.AUTO-MCNC: 257 MG/DL (ref 65–100)
HCT VFR BLD AUTO: 34.5 % (ref 41.1–50.3)
HGB BLD-MCNC: 10.7 G/DL (ref 13.6–17.2)
SPECIMEN EXP DATE BLD: NORMAL

## 2020-10-01 PROCEDURE — 77030019908 HC STETH ESOPH SIMS -A: Performed by: ANESTHESIOLOGY

## 2020-10-01 PROCEDURE — 74011250636 HC RX REV CODE- 250/636: Performed by: UROLOGY

## 2020-10-01 PROCEDURE — 86900 BLOOD TYPING SEROLOGIC ABO: CPT

## 2020-10-01 PROCEDURE — 77030027876 HC STPLR ENDOSC FLX PWR J&J -G1: Performed by: UROLOGY

## 2020-10-01 PROCEDURE — 76010000174 HC OR TIME 3.5 TO 4 HR INTENSV-TIER 1: Performed by: UROLOGY

## 2020-10-01 PROCEDURE — 85018 HEMOGLOBIN: CPT

## 2020-10-01 PROCEDURE — 77030037088 HC TUBE ENDOTRACH ORAL NSL COVD-A: Performed by: ANESTHESIOLOGY

## 2020-10-01 PROCEDURE — 0TT10ZZ RESECTION OF LEFT KIDNEY, OPEN APPROACH: ICD-10-PCS | Performed by: UROLOGY

## 2020-10-01 PROCEDURE — 82962 GLUCOSE BLOOD TEST: CPT

## 2020-10-01 PROCEDURE — 77030007956 HC PCH ENDOSC SPEC COVD -C: Performed by: UROLOGY

## 2020-10-01 PROCEDURE — 77030031139 HC SUT VCRL2 J&J -A: Performed by: UROLOGY

## 2020-10-01 PROCEDURE — 77030009963 HC RELD STPLR ECR J&J -C: Performed by: UROLOGY

## 2020-10-01 PROCEDURE — 77030034628 HC LIGASURE LAP SEAL DIV MD COVD -F: Performed by: UROLOGY

## 2020-10-01 PROCEDURE — 77030034154 HC SHR COAG HARM ACE J&J -F: Performed by: UROLOGY

## 2020-10-01 PROCEDURE — 77030008756 HC TU IRR SUC STRY -B: Performed by: UROLOGY

## 2020-10-01 PROCEDURE — 77030000038 HC TIP SCIS LAPSCP SURI -B: Performed by: UROLOGY

## 2020-10-01 PROCEDURE — 74011250637 HC RX REV CODE- 250/637: Performed by: ANESTHESIOLOGY

## 2020-10-01 PROCEDURE — 74011250637 HC RX REV CODE- 250/637: Performed by: UROLOGY

## 2020-10-01 PROCEDURE — 74011636637 HC RX REV CODE- 636/637: Performed by: ANESTHESIOLOGY

## 2020-10-01 PROCEDURE — 65270000029 HC RM PRIVATE

## 2020-10-01 PROCEDURE — 74011000250 HC RX REV CODE- 250: Performed by: ANESTHESIOLOGY

## 2020-10-01 PROCEDURE — 77030008606 HC TRCR ENDOSC KII AMR -B: Performed by: UROLOGY

## 2020-10-01 PROCEDURE — 82355 CALCULUS ANALYSIS QUAL: CPT

## 2020-10-01 PROCEDURE — 76210000017 HC OR PH I REC 1.5 TO 2 HR: Performed by: UROLOGY

## 2020-10-01 PROCEDURE — 76060000038 HC ANESTHESIA 3.5 TO 4 HR: Performed by: UROLOGY

## 2020-10-01 PROCEDURE — 74011250636 HC RX REV CODE- 250/636: Performed by: NURSE ANESTHETIST, CERTIFIED REGISTERED

## 2020-10-01 PROCEDURE — 2709999900 HC NON-CHARGEABLE SUPPLY

## 2020-10-01 PROCEDURE — 77030014008 HC SPNG HEMSTAT J&J -C: Performed by: UROLOGY

## 2020-10-01 PROCEDURE — 77030008462 HC STPLR SKN PROX J&J -A: Performed by: UROLOGY

## 2020-10-01 PROCEDURE — 77030008518 HC TBNG INSUF ENDO STRY -B: Performed by: UROLOGY

## 2020-10-01 PROCEDURE — 77030018875 HC APPL CLP LIG4 J&J -B: Performed by: UROLOGY

## 2020-10-01 PROCEDURE — 77030039425 HC BLD LARYNG TRULITE DISP TELE -A: Performed by: ANESTHESIOLOGY

## 2020-10-01 PROCEDURE — 77030014090 HC TRCR OPT AMR -B: Performed by: UROLOGY

## 2020-10-01 PROCEDURE — 77030005518 HC CATH URETH FOL 2W BARD -B: Performed by: UROLOGY

## 2020-10-01 PROCEDURE — 74011250636 HC RX REV CODE- 250/636: Performed by: ANESTHESIOLOGY

## 2020-10-01 PROCEDURE — 77030040361 HC SLV COMPR DVT MDII -B: Performed by: UROLOGY

## 2020-10-01 PROCEDURE — 77030018673: Performed by: UROLOGY

## 2020-10-01 PROCEDURE — 74011636637 HC RX REV CODE- 636/637: Performed by: UROLOGY

## 2020-10-01 PROCEDURE — 77030009527 HC GEL PRT SYS AMR -E: Performed by: UROLOGY

## 2020-10-01 PROCEDURE — 2709999900 HC NON-CHARGEABLE SUPPLY: Performed by: UROLOGY

## 2020-10-01 PROCEDURE — 88307 TISSUE EXAM BY PATHOLOGIST: CPT

## 2020-10-01 PROCEDURE — 77030002966 HC SUT PDS J&J -A: Performed by: UROLOGY

## 2020-10-01 PROCEDURE — 74011000250 HC RX REV CODE- 250: Performed by: NURSE ANESTHETIST, CERTIFIED REGISTERED

## 2020-10-01 RX ORDER — NALOXONE HYDROCHLORIDE 0.4 MG/ML
0.4 INJECTION, SOLUTION INTRAMUSCULAR; INTRAVENOUS; SUBCUTANEOUS AS NEEDED
Status: DISCONTINUED | OUTPATIENT
Start: 2020-10-01 | End: 2020-10-03 | Stop reason: HOSPADM

## 2020-10-01 RX ORDER — KETOROLAC TROMETHAMINE 30 MG/ML
INJECTION, SOLUTION INTRAMUSCULAR; INTRAVENOUS AS NEEDED
Status: DISCONTINUED | OUTPATIENT
Start: 2020-10-01 | End: 2020-10-01

## 2020-10-01 RX ORDER — EPHEDRINE SULFATE/0.9% NACL/PF 50 MG/5 ML
SYRINGE (ML) INTRAVENOUS AS NEEDED
Status: DISCONTINUED | OUTPATIENT
Start: 2020-10-01 | End: 2020-10-01 | Stop reason: HOSPADM

## 2020-10-01 RX ORDER — ONDANSETRON 2 MG/ML
INJECTION INTRAMUSCULAR; INTRAVENOUS AS NEEDED
Status: DISCONTINUED | OUTPATIENT
Start: 2020-10-01 | End: 2020-10-01 | Stop reason: HOSPADM

## 2020-10-01 RX ORDER — KETAMINE HYDROCHLORIDE 50 MG/ML
INJECTION, SOLUTION INTRAMUSCULAR; INTRAVENOUS AS NEEDED
Status: DISCONTINUED | OUTPATIENT
Start: 2020-10-01 | End: 2020-10-01 | Stop reason: HOSPADM

## 2020-10-01 RX ORDER — DEXAMETHASONE SODIUM PHOSPHATE 4 MG/ML
INJECTION, SOLUTION INTRA-ARTICULAR; INTRALESIONAL; INTRAMUSCULAR; INTRAVENOUS; SOFT TISSUE AS NEEDED
Status: DISCONTINUED | OUTPATIENT
Start: 2020-10-01 | End: 2020-10-01 | Stop reason: HOSPADM

## 2020-10-01 RX ORDER — SODIUM CHLORIDE, SODIUM LACTATE, POTASSIUM CHLORIDE, CALCIUM CHLORIDE 600; 310; 30; 20 MG/100ML; MG/100ML; MG/100ML; MG/100ML
INJECTION, SOLUTION INTRAVENOUS
Status: DISCONTINUED | OUTPATIENT
Start: 2020-10-01 | End: 2020-10-01

## 2020-10-01 RX ORDER — LIDOCAINE HYDROCHLORIDE 20 MG/ML
INJECTION, SOLUTION EPIDURAL; INFILTRATION; INTRACAUDAL; PERINEURAL AS NEEDED
Status: DISCONTINUED | OUTPATIENT
Start: 2020-10-01 | End: 2020-10-01 | Stop reason: HOSPADM

## 2020-10-01 RX ORDER — MIDAZOLAM HYDROCHLORIDE 1 MG/ML
2 INJECTION, SOLUTION INTRAMUSCULAR; INTRAVENOUS
Status: DISCONTINUED | OUTPATIENT
Start: 2020-10-01 | End: 2020-10-01 | Stop reason: HOSPADM

## 2020-10-01 RX ORDER — ROPIVACAINE HYDROCHLORIDE 5 MG/ML
INJECTION, SOLUTION EPIDURAL; INFILTRATION; PERINEURAL
Status: DISCONTINUED | OUTPATIENT
Start: 2020-10-01 | End: 2020-10-01 | Stop reason: HOSPADM

## 2020-10-01 RX ORDER — SODIUM CHLORIDE, SODIUM LACTATE, POTASSIUM CHLORIDE, CALCIUM CHLORIDE 600; 310; 30; 20 MG/100ML; MG/100ML; MG/100ML; MG/100ML
100 INJECTION, SOLUTION INTRAVENOUS CONTINUOUS
Status: DISCONTINUED | OUTPATIENT
Start: 2020-10-01 | End: 2020-10-01

## 2020-10-01 RX ORDER — INSULIN LISPRO 100 [IU]/ML
4 INJECTION, SOLUTION INTRAVENOUS; SUBCUTANEOUS ONCE
Status: COMPLETED | OUTPATIENT
Start: 2020-10-01 | End: 2020-10-01

## 2020-10-01 RX ORDER — OXYCODONE HYDROCHLORIDE 5 MG/1
5 TABLET ORAL
Status: DISCONTINUED | OUTPATIENT
Start: 2020-10-01 | End: 2020-10-03 | Stop reason: HOSPADM

## 2020-10-01 RX ORDER — HYDROMORPHONE HYDROCHLORIDE 2 MG/ML
0.5 INJECTION, SOLUTION INTRAMUSCULAR; INTRAVENOUS; SUBCUTANEOUS
Status: DISCONTINUED | OUTPATIENT
Start: 2020-10-01 | End: 2020-10-01 | Stop reason: HOSPADM

## 2020-10-01 RX ORDER — FENTANYL CITRATE 50 UG/ML
INJECTION, SOLUTION INTRAMUSCULAR; INTRAVENOUS AS NEEDED
Status: DISCONTINUED | OUTPATIENT
Start: 2020-10-01 | End: 2020-10-01 | Stop reason: HOSPADM

## 2020-10-01 RX ORDER — LIDOCAINE HYDROCHLORIDE ANHYDROUS AND DEXTROSE MONOHYDRATE .4; 5 G/100ML; G/100ML
1 INJECTION, SOLUTION INTRAVENOUS CONTINUOUS
Status: DISPENSED | OUTPATIENT
Start: 2020-10-01 | End: 2020-10-02

## 2020-10-01 RX ORDER — ONDANSETRON 4 MG/1
4 TABLET, ORALLY DISINTEGRATING ORAL
Status: DISCONTINUED | OUTPATIENT
Start: 2020-10-01 | End: 2020-10-03 | Stop reason: HOSPADM

## 2020-10-01 RX ORDER — ALVIMOPAN 12 MG/1
12 CAPSULE ORAL 2 TIMES DAILY
Status: DISCONTINUED | OUTPATIENT
Start: 2020-10-02 | End: 2020-10-03 | Stop reason: HOSPADM

## 2020-10-01 RX ORDER — SODIUM CHLORIDE, SODIUM LACTATE, POTASSIUM CHLORIDE, CALCIUM CHLORIDE 600; 310; 30; 20 MG/100ML; MG/100ML; MG/100ML; MG/100ML
100 INJECTION, SOLUTION INTRAVENOUS CONTINUOUS
Status: DISCONTINUED | OUTPATIENT
Start: 2020-10-01 | End: 2020-10-01 | Stop reason: HOSPADM

## 2020-10-01 RX ORDER — SODIUM CHLORIDE, SODIUM LACTATE, POTASSIUM CHLORIDE, CALCIUM CHLORIDE 600; 310; 30; 20 MG/100ML; MG/100ML; MG/100ML; MG/100ML
25 INJECTION, SOLUTION INTRAVENOUS CONTINUOUS
Status: DISCONTINUED | OUTPATIENT
Start: 2020-10-01 | End: 2020-10-03 | Stop reason: HOSPADM

## 2020-10-01 RX ORDER — PROPOFOL 10 MG/ML
INJECTION, EMULSION INTRAVENOUS AS NEEDED
Status: DISCONTINUED | OUTPATIENT
Start: 2020-10-01 | End: 2020-10-01 | Stop reason: HOSPADM

## 2020-10-01 RX ORDER — ACETAMINOPHEN 500 MG
1000 TABLET ORAL ONCE
Status: COMPLETED | OUTPATIENT
Start: 2020-10-01 | End: 2020-10-01

## 2020-10-01 RX ORDER — ACETAMINOPHEN 500 MG
1000 TABLET ORAL EVERY 6 HOURS
Status: DISCONTINUED | OUTPATIENT
Start: 2020-10-01 | End: 2020-10-03 | Stop reason: HOSPADM

## 2020-10-01 RX ORDER — ROCURONIUM BROMIDE 10 MG/ML
INJECTION, SOLUTION INTRAVENOUS AS NEEDED
Status: DISCONTINUED | OUTPATIENT
Start: 2020-10-01 | End: 2020-10-01 | Stop reason: HOSPADM

## 2020-10-01 RX ORDER — GLYCOPYRROLATE 0.2 MG/ML
INJECTION INTRAMUSCULAR; INTRAVENOUS AS NEEDED
Status: DISCONTINUED | OUTPATIENT
Start: 2020-10-01 | End: 2020-10-01 | Stop reason: HOSPADM

## 2020-10-01 RX ORDER — HYDROMORPHONE HYDROCHLORIDE 1 MG/ML
1 INJECTION, SOLUTION INTRAMUSCULAR; INTRAVENOUS; SUBCUTANEOUS
Status: DISCONTINUED | OUTPATIENT
Start: 2020-10-01 | End: 2020-10-03 | Stop reason: HOSPADM

## 2020-10-01 RX ORDER — LIDOCAINE HYDROCHLORIDE ANHYDROUS AND DEXTROSE MONOHYDRATE .4; 5 G/100ML; G/100ML
INJECTION, SOLUTION INTRAVENOUS
Status: DISCONTINUED | OUTPATIENT
Start: 2020-10-01 | End: 2020-10-01

## 2020-10-01 RX ORDER — DIPHENHYDRAMINE HCL 25 MG
25 CAPSULE ORAL
Status: DISCONTINUED | OUTPATIENT
Start: 2020-10-01 | End: 2020-10-03 | Stop reason: HOSPADM

## 2020-10-01 RX ORDER — CELECOXIB 100 MG/1
100 CAPSULE ORAL 2 TIMES DAILY
Status: DISCONTINUED | OUTPATIENT
Start: 2020-10-02 | End: 2020-10-03 | Stop reason: HOSPADM

## 2020-10-01 RX ORDER — NEOSTIGMINE METHYLSULFATE 1 MG/ML
INJECTION, SOLUTION INTRAVENOUS AS NEEDED
Status: DISCONTINUED | OUTPATIENT
Start: 2020-10-01 | End: 2020-10-01 | Stop reason: HOSPADM

## 2020-10-01 RX ORDER — OXYCODONE HYDROCHLORIDE 5 MG/1
10 TABLET ORAL
Status: DISCONTINUED | OUTPATIENT
Start: 2020-10-01 | End: 2020-10-01 | Stop reason: HOSPADM

## 2020-10-01 RX ORDER — LIDOCAINE HYDROCHLORIDE 10 MG/ML
0.3 INJECTION INFILTRATION; PERINEURAL ONCE
Status: DISCONTINUED | OUTPATIENT
Start: 2020-10-01 | End: 2020-10-01 | Stop reason: HOSPADM

## 2020-10-01 RX ORDER — LIDOCAINE HYDROCHLORIDE ANHYDROUS AND DEXTROSE MONOHYDRATE .4; 5 G/100ML; G/100ML
INJECTION, SOLUTION INTRAVENOUS
Status: DISCONTINUED | OUTPATIENT
Start: 2020-10-01 | End: 2020-10-01 | Stop reason: HOSPADM

## 2020-10-01 RX ORDER — GABAPENTIN 300 MG/1
300 CAPSULE ORAL 3 TIMES DAILY
Status: DISCONTINUED | OUTPATIENT
Start: 2020-10-01 | End: 2020-10-03 | Stop reason: HOSPADM

## 2020-10-01 RX ORDER — SODIUM CHLORIDE, SODIUM LACTATE, POTASSIUM CHLORIDE, CALCIUM CHLORIDE 600; 310; 30; 20 MG/100ML; MG/100ML; MG/100ML; MG/100ML
INJECTION, SOLUTION INTRAVENOUS
Status: DISCONTINUED | OUTPATIENT
Start: 2020-10-01 | End: 2020-10-01 | Stop reason: HOSPADM

## 2020-10-01 RX ORDER — INSULIN LISPRO 100 [IU]/ML
INJECTION, SOLUTION INTRAVENOUS; SUBCUTANEOUS
Status: DISCONTINUED | OUTPATIENT
Start: 2020-10-01 | End: 2020-10-03 | Stop reason: HOSPADM

## 2020-10-01 RX ADMIN — ACETAMINOPHEN 1000 MG: 500 TABLET, FILM COATED ORAL at 23:32

## 2020-10-01 RX ADMIN — HYDROMORPHONE HYDROCHLORIDE 0.5 MG: 2 INJECTION INTRAMUSCULAR; INTRAVENOUS; SUBCUTANEOUS at 15:23

## 2020-10-01 RX ADMIN — KETAMINE HYDROCHLORIDE 56 MG: 50 INJECTION INTRAMUSCULAR; INTRAVENOUS at 13:00

## 2020-10-01 RX ADMIN — ROCURONIUM BROMIDE 20 MG: 10 INJECTION, SOLUTION INTRAVENOUS at 11:57

## 2020-10-01 RX ADMIN — Medication 4 MG: at 14:19

## 2020-10-01 RX ADMIN — SODIUM CHLORIDE, SODIUM LACTATE, POTASSIUM CHLORIDE, AND CALCIUM CHLORIDE 100 ML/HR: 600; 310; 30; 20 INJECTION, SOLUTION INTRAVENOUS at 09:53

## 2020-10-01 RX ADMIN — GLYCOPYRROLATE 0.6 MG: 0.2 INJECTION, SOLUTION INTRAMUSCULAR; INTRAVENOUS at 14:19

## 2020-10-01 RX ADMIN — ROCURONIUM BROMIDE 10 MG: 10 INJECTION, SOLUTION INTRAVENOUS at 13:22

## 2020-10-01 RX ADMIN — ROPIVACAINE HYDROCHLORIDE 15 ML: 150 INJECTION, SOLUTION EPIDURAL; INFILTRATION; PERINEURAL at 14:21

## 2020-10-01 RX ADMIN — KETAMINE HYDROCHLORIDE 0.1 MG: 50 INJECTION INTRAMUSCULAR; INTRAVENOUS at 11:31

## 2020-10-01 RX ADMIN — PROPOFOL 200 MG: 10 INJECTION, EMULSION INTRAVENOUS at 11:14

## 2020-10-01 RX ADMIN — LIDOCAINE HYDROCHLORIDE 100 MG: 20 INJECTION, SOLUTION EPIDURAL; INFILTRATION; INTRACAUDAL; PERINEURAL at 11:14

## 2020-10-01 RX ADMIN — PROMETHAZINE HYDROCHLORIDE 6.25 MG: 25 INJECTION INTRAMUSCULAR; INTRAVENOUS at 15:30

## 2020-10-01 RX ADMIN — ROCURONIUM BROMIDE 20 MG: 10 INJECTION, SOLUTION INTRAVENOUS at 12:32

## 2020-10-01 RX ADMIN — SODIUM CHLORIDE, SODIUM LACTATE, POTASSIUM CHLORIDE, AND CALCIUM CHLORIDE 25 ML/HR: 600; 310; 30; 20 INJECTION, SOLUTION INTRAVENOUS at 17:56

## 2020-10-01 RX ADMIN — PHENYLEPHRINE HYDROCHLORIDE 100 MCG: 10 INJECTION INTRAVENOUS at 11:34

## 2020-10-01 RX ADMIN — Medication 10 MG: at 11:30

## 2020-10-01 RX ADMIN — INSULIN LISPRO 4 UNITS: 100 INJECTION, SOLUTION INTRAVENOUS; SUBCUTANEOUS at 21:31

## 2020-10-01 RX ADMIN — KETAMINE HYDROCHLORIDE 33.42 MG: 50 INJECTION INTRAMUSCULAR; INTRAVENOUS at 13:56

## 2020-10-01 RX ADMIN — PHENYLEPHRINE HYDROCHLORIDE 120 MCG: 10 INJECTION INTRAVENOUS at 12:38

## 2020-10-01 RX ADMIN — ROPIVACAINE HYDROCHLORIDE 15 ML: 150 INJECTION, SOLUTION EPIDURAL; INFILTRATION; PERINEURAL at 14:18

## 2020-10-01 RX ADMIN — OXYCODONE 5 MG: 5 TABLET ORAL at 21:31

## 2020-10-01 RX ADMIN — KETAMINE HYDROCHLORIDE 45 MG: 50 INJECTION INTRAMUSCULAR; INTRAVENOUS at 11:14

## 2020-10-01 RX ADMIN — ACETAMINOPHEN 1000 MG: 500 TABLET, FILM COATED ORAL at 09:59

## 2020-10-01 RX ADMIN — CEFAZOLIN 3 G: 1 INJECTION, POWDER, FOR SOLUTION INTRAVENOUS at 11:47

## 2020-10-01 RX ADMIN — SODIUM CHLORIDE, SODIUM LACTATE, POTASSIUM CHLORIDE, AND CALCIUM CHLORIDE: 600; 310; 30; 20 INJECTION, SOLUTION INTRAVENOUS at 11:29

## 2020-10-01 RX ADMIN — ACETAMINOPHEN 1000 MG: 500 TABLET, FILM COATED ORAL at 17:57

## 2020-10-01 RX ADMIN — INSULIN LISPRO 4 UNITS: 100 INJECTION, SOLUTION INTRAVENOUS; SUBCUTANEOUS at 15:43

## 2020-10-01 RX ADMIN — ROCURONIUM BROMIDE 50 MG: 10 INJECTION, SOLUTION INTRAVENOUS at 11:14

## 2020-10-01 RX ADMIN — DEXAMETHASONE SODIUM PHOSPHATE 4 MG: 4 INJECTION, SOLUTION INTRAMUSCULAR; INTRAVENOUS at 11:47

## 2020-10-01 RX ADMIN — LIDOCAINE HYDROCHLORIDE 1 MG/KG/HR: 4 INJECTION, SOLUTION INTRAVENOUS at 11:31

## 2020-10-01 RX ADMIN — HYDROMORPHONE HYDROCHLORIDE 1 MG: 1 INJECTION, SOLUTION INTRAMUSCULAR; INTRAVENOUS; SUBCUTANEOUS at 17:57

## 2020-10-01 RX ADMIN — PHENYLEPHRINE HYDROCHLORIDE 200 MCG: 10 INJECTION INTRAVENOUS at 11:30

## 2020-10-01 RX ADMIN — PHENYLEPHRINE HYDROCHLORIDE 100 MCG: 10 INJECTION INTRAVENOUS at 11:28

## 2020-10-01 RX ADMIN — FENTANYL CITRATE 100 MCG: 50 INJECTION INTRAMUSCULAR; INTRAVENOUS at 11:14

## 2020-10-01 RX ADMIN — LIDOCAINE HYDROCHLORIDE ANHYDROUS AND DEXTROSE MONOHYDRATE 1 MG/KG/HR: .4; 5 INJECTION, SOLUTION INTRAVENOUS at 18:00

## 2020-10-01 RX ADMIN — PHENYLEPHRINE HYDROCHLORIDE 100 MCG: 10 INJECTION INTRAVENOUS at 11:48

## 2020-10-01 RX ADMIN — ROCURONIUM BROMIDE 10 MG: 10 INJECTION, SOLUTION INTRAVENOUS at 13:34

## 2020-10-01 RX ADMIN — ONDANSETRON 4 MG: 2 INJECTION INTRAMUSCULAR; INTRAVENOUS at 14:00

## 2020-10-01 RX ADMIN — GABAPENTIN 300 MG: 300 CAPSULE ORAL at 21:31

## 2020-10-01 NOTE — PROGRESS NOTES
TRANSFER - IN REPORT:    Verbal report received from Ramiro S William Bell Heidi Alonzo  being received from PACU for routine post - op      Report consisted of patients Situation, Background, Assessment and   Recommendations(SBAR). Information from the following report(s) SBAR was reviewed with the receiving nurse. Opportunity for questions and clarification was provided. Assessment completed upon patients arrival to unit and care assumed.

## 2020-10-01 NOTE — ANESTHESIA PREPROCEDURE EVALUATION
Relevant Problems   No relevant active problems       Anesthetic History   No history of anesthetic complications       Comments: Slow to awaken with cysto May 2020     Review of Systems / Medical History  Patient summary reviewed and pertinent labs reviewed    Pulmonary        Sleep apnea (s/p UPPP, does not wear CPAP and wife reports very heavy snoring)           Neuro/Psych   Within defined limits           Cardiovascular    Hypertension: well controlled              Exercise tolerance: >4 METS     GI/Hepatic/Renal     GERD: well controlled    Renal disease (Renal mass)      Comments: Cystic pancreatic mass    GIB Endo/Other    Diabetes: poorly controlled, type 2, using insulin  Hypothyroidism: well controlled  Obesity, arthritis and anemia (Hb 10.5)     Other Findings              Physical Exam    Airway  Mallampati: II  TM Distance: > 6 cm  Neck ROM: normal range of motion   Mouth opening: Normal     Cardiovascular    Rhythm: regular  Rate: normal         Dental    Dentition: Caps/crowns     Pulmonary  Breath sounds clear to auscultation               Abdominal         Other Findings            Anesthetic Plan    ASA: 3  Anesthesia type: general          Induction: Intravenous  Anesthetic plan and risks discussed with: Patient

## 2020-10-01 NOTE — PERIOP NOTES
TRANSFER - OUT REPORT:    Verbal report given to [address] [place] [address] on Julio Belcher  being transferred to  for routine post - op       Report consisted of patients Situation, Background, Assessment and   Recommendations(SBAR). Information from the following report(s) SBAR, OR Summary, Procedure Summary, Intake/Output and MAR was reviewed with the receiving nurse. Lines:   Peripheral IV 10/01/20 Distal;Posterior;Right Forearm (Active)   Site Assessment Clean, dry, & intact 10/01/20 1445   Phlebitis Assessment 0 10/01/20 1445   Infiltration Assessment 0 10/01/20 1445   Dressing Status Clean, dry, & intact 10/01/20 1445   Dressing Type Transparent;Tape 10/01/20 1445   Hub Color/Line Status Pink; Infusing 10/01/20 1445   Action Taken Blood drawn 10/01/20 0936       Peripheral IV 10/01/20 Left Wrist (Active)   Site Assessment Clean, dry, & intact 10/01/20 1445   Phlebitis Assessment 0 10/01/20 1445   Infiltration Assessment 0 10/01/20 1445   Dressing Status Clean, dry, & intact 10/01/20 1445   Dressing Type Transparent;Tape 10/01/20 1445   Hub Color/Line Status Green;Flushed;Capped 10/01/20 1445        Opportunity for questions and clarification was provided. Patient transported with:   O2 @ 4 liters    VTE prophylaxis orders have been written for Julio Belcher. Patient and family given floor number and nurses name. Family updated re: pt status after security code verified.

## 2020-10-01 NOTE — ANESTHESIA POSTPROCEDURE EVALUATION
Procedure(s):  ERAS / LEFT NEPHRECTOMY LAPAROSCOPIC HAND ASSISTED. general    Anesthesia Post Evaluation      Multimodal analgesia: multimodal analgesia used between 6 hours prior to anesthesia start to PACU discharge  Patient location during evaluation: PACU  Patient participation: complete - patient participated  Level of consciousness: sleepy but conscious  Pain management: adequate  Airway patency: patent  Anesthetic complications: no  Cardiovascular status: acceptable  Respiratory status: acceptable  Hydration status: acceptable  Post anesthesia nausea and vomiting:  none      INITIAL Post-op Vital signs:   Vitals Value Taken Time   /76 10/1/2020  5:09 PM   Temp 36.8 °C (98.2 °F) 10/1/2020  4:40 PM   Pulse 98 10/1/2020  5:12 PM   Resp 18 10/1/2020  4:59 PM   SpO2 98 % 10/1/2020  5:12 PM   Vitals shown include unvalidated device data.
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
## 2020-10-01 NOTE — ANESTHESIA PROCEDURE NOTES
Left rectus sheath block    Start time: 10/1/2020 2:14 PM  End time: 10/1/2020 2:18 PM  Performed by: Kevin Tierney MD  Authorized by: Kevin Tierney MD       Pre-procedure:    Indications: at surgeon's request and post-op pain management    Preanesthetic Checklist: patient identified, risks and benefits discussed, site marked, timeout performed, anesthesia consent given and patient being monitored    Timeout Time: 14:14          Block Type:   Block Type:  Rectus sheath  Laterality:  Left  Monitoring:  Standard ASA monitoring  Injection Technique:  Single shot  Procedures: ultrasound guided    Patient Position: supine (right tilt)  Prep: chlorhexidine    Location:  Abdominal  Needle Gauge:  20 G  Needle Localization:  Ultrasound guidance  Medication Injected:  Ropivacaine (PF) (NAROPIN)(0.5%) 5 mg/mL injection, 15 mL  Med Admin Time: 10/1/2020 2:18 PM    Assessment:  Number of attempts:  1  Injection Assessment:  Incremental injection every 5 mL, local visualized surrounding nerve on ultrasound, negative aspiration for blood, ultrasound image on chart and no intravascular symptoms  Patient tolerance:  Patient tolerated the procedure well with no immediate complications

## 2020-10-01 NOTE — ANESTHESIA PROCEDURE NOTES
Left TAP block    Start time: 10/1/2020 2:18 PM  End time: 10/1/2020 2:21 PM  Performed by: Idris Camargo MD  Authorized by: Idris Camargo MD       Pre-procedure:    Indications: at surgeon's request and post-op pain management    Preanesthetic Checklist: patient identified, risks and benefits discussed, site marked, timeout performed, anesthesia consent given and patient being monitored    Timeout Time: 14:18          Block Type:   Block Type:  TAP  Laterality:  Left  Monitoring:  Standard ASA monitoring  Injection Technique:  Single shot  Procedures: ultrasound guided    Patient Position: supine (right tilt)  Prep: chlorhexidine    Location:  Abdominal  Needle Gauge:  20 G  Needle Localization:  Ultrasound guidance  Medication Injected:  Ropivacaine (PF) (NAROPIN)(0.5%) 5 mg/mL injection, 15 mL  Med Admin Time: 10/1/2020 2:21 PM    Assessment:  Number of attempts:  1  Injection Assessment:  Incremental injection every 5 mL, local visualized surrounding nerve on ultrasound, negative aspiration for blood, no intravascular symptoms and ultrasound image on chart  Patient tolerance:  Patient tolerated the procedure well with no immediate complications

## 2020-10-01 NOTE — PERIOP NOTES
Lidocaine drip pump settings confirmed at [facility] body weight: 73 kg (160 lb 15 oz). Infusing at 18.3 ml/hour.

## 2020-10-01 NOTE — BRIEF OP NOTE
Brief Postoperative Note    Patient: Karl Titus  YOB: 1955  MRN: [id number]    Date of Procedure: 10/1/2020     Pre-Op Diagnosis: Renal mass [N28.89]    Post-Op Diagnosis: Same as preoperative diagnosis.       Procedure(s):  ERAS / LEFT NEPHRECTOMY LAPAROSCOPIC HAND ASSISTED    Surgeon(s):  MD Miguel Campbell Dortha Carver, DO    Surgical Assistant: None    Anesthesia: General     Estimated Blood Loss (mL): 136 ml    Complications: None    Specimens:   ID Type Source Tests Collected by Time Destination   1 : Left Kidney Fresh Kidney  Ji Leblanc MD 10/1/2020 1402 Pathology        Implants: * No implants in log *    Drains: * No LDAs found *    Findings: see op note    Electronically Signed by Aria Nicolas MD on 10/1/2020 at 2:17 PM

## 2020-10-01 NOTE — PERIOP NOTES
When starting pt's IV, pt experienced a vasovagal response with hypotension and diaphoresis. PT states he became dizzy as well. Dr. Leroy Martinez at bedside and aware. Pt placed on 3 liters O2 and monitor. Fluids opened wide.

## 2020-10-01 NOTE — PROGRESS NOTES
10/01/20 1800   Dual Skin Pressure Injury Assessment   Dual Skin Pressure Injury Assessment WDL   Second Care Provider (Based on [address] [place] [address]) [address] [place] [address] on RUE.  No other skin issues

## 2020-10-02 LAB
ANION GAP SERPL CALC-SCNC: 8 MMOL/L (ref 7–16)
BUN SERPL-MCNC: 18 MG/DL (ref 8–23)
CALCIUM SERPL-MCNC: 8.1 MG/DL (ref 8.3–10.4)
CHLORIDE SERPL-SCNC: 106 MMOL/L (ref 98–107)
CO2 SERPL-SCNC: 26 MMOL/L (ref 21–32)
CREAT SERPL-MCNC: 1.57 MG/DL (ref 0.8–1.5)
GLUCOSE BLD STRIP.AUTO-MCNC: 182 MG/DL (ref 65–100)
GLUCOSE BLD STRIP.AUTO-MCNC: 235 MG/DL (ref 65–100)
GLUCOSE BLD STRIP.AUTO-MCNC: 262 MG/DL (ref 65–100)
GLUCOSE BLD STRIP.AUTO-MCNC: 293 MG/DL (ref 65–100)
GLUCOSE SERPL-MCNC: 142 MG/DL (ref 65–100)
HCT VFR BLD AUTO: 33.6 % (ref 41.1–50.3)
HGB BLD-MCNC: 10.2 G/DL (ref 13.6–17.2)
MAGNESIUM SERPL-MCNC: 2.6 MG/DL (ref 1.8–2.4)
PHOSPHATE SERPL-MCNC: 4.9 MG/DL (ref 2.3–3.7)
POTASSIUM SERPL-SCNC: 4.6 MMOL/L (ref 3.5–5.1)
SODIUM SERPL-SCNC: 140 MMOL/L (ref 136–145)

## 2020-10-02 PROCEDURE — 74011636637 HC RX REV CODE- 636/637: Performed by: NURSE PRACTITIONER

## 2020-10-02 PROCEDURE — 82962 GLUCOSE BLOOD TEST: CPT

## 2020-10-02 PROCEDURE — 80048 BASIC METABOLIC PNL TOTAL CA: CPT

## 2020-10-02 PROCEDURE — 85018 HEMOGLOBIN: CPT

## 2020-10-02 PROCEDURE — 84100 ASSAY OF PHOSPHORUS: CPT

## 2020-10-02 PROCEDURE — 51798 US URINE CAPACITY MEASURE: CPT

## 2020-10-02 PROCEDURE — 74011636637 HC RX REV CODE- 636/637: Performed by: UROLOGY

## 2020-10-02 PROCEDURE — 83735 ASSAY OF MAGNESIUM: CPT

## 2020-10-02 PROCEDURE — 65270000029 HC RM PRIVATE

## 2020-10-02 PROCEDURE — 36415 COLL VENOUS BLD VENIPUNCTURE: CPT

## 2020-10-02 PROCEDURE — 74011250637 HC RX REV CODE- 250/637: Performed by: UROLOGY

## 2020-10-02 PROCEDURE — 74011250637 HC RX REV CODE- 250/637: Performed by: NURSE PRACTITIONER

## 2020-10-02 RX ORDER — INSULIN LISPRO 100 [IU]/ML
3 INJECTION, SOLUTION INTRAVENOUS; SUBCUTANEOUS
Status: DISCONTINUED | OUTPATIENT
Start: 2020-10-02 | End: 2020-10-03 | Stop reason: HOSPADM

## 2020-10-02 RX ORDER — INSULIN GLARGINE 100 [IU]/ML
30 INJECTION, SOLUTION SUBCUTANEOUS DAILY
Status: DISCONTINUED | OUTPATIENT
Start: 2020-10-02 | End: 2020-10-03 | Stop reason: HOSPADM

## 2020-10-02 RX ORDER — PRAVASTATIN SODIUM 20 MG/1
40 TABLET ORAL DAILY
Status: DISCONTINUED | OUTPATIENT
Start: 2020-10-02 | End: 2020-10-03 | Stop reason: HOSPADM

## 2020-10-02 RX ORDER — LOSARTAN POTASSIUM 25 MG/1
25 TABLET ORAL DAILY
Status: DISCONTINUED | OUTPATIENT
Start: 2020-10-02 | End: 2020-10-03 | Stop reason: HOSPADM

## 2020-10-02 RX ORDER — LEVOTHYROXINE SODIUM 100 UG/1
200 TABLET ORAL
Status: DISCONTINUED | OUTPATIENT
Start: 2020-10-03 | End: 2020-10-03 | Stop reason: HOSPADM

## 2020-10-02 RX ORDER — HYDROCHLOROTHIAZIDE 25 MG/1
25 TABLET ORAL DAILY
Status: DISCONTINUED | OUTPATIENT
Start: 2020-10-02 | End: 2020-10-03 | Stop reason: HOSPADM

## 2020-10-02 RX ADMIN — ALVIMOPAN 12 MG: 12 CAPSULE ORAL at 09:19

## 2020-10-02 RX ADMIN — OXYCODONE 5 MG: 5 TABLET ORAL at 12:43

## 2020-10-02 RX ADMIN — GABAPENTIN 300 MG: 300 CAPSULE ORAL at 09:19

## 2020-10-02 RX ADMIN — HYDROCHLOROTHIAZIDE 25 MG: 25 TABLET ORAL at 12:43

## 2020-10-02 RX ADMIN — GABAPENTIN 300 MG: 300 CAPSULE ORAL at 22:24

## 2020-10-02 RX ADMIN — INSULIN LISPRO 2 UNITS: 100 INJECTION, SOLUTION INTRAVENOUS; SUBCUTANEOUS at 07:30

## 2020-10-02 RX ADMIN — ACETAMINOPHEN 1000 MG: 500 TABLET, FILM COATED ORAL at 17:01

## 2020-10-02 RX ADMIN — ACETAMINOPHEN 1000 MG: 500 TABLET, FILM COATED ORAL at 06:00

## 2020-10-02 RX ADMIN — ACETAMINOPHEN 1000 MG: 500 TABLET, FILM COATED ORAL at 23:49

## 2020-10-02 RX ADMIN — ALVIMOPAN 12 MG: 12 CAPSULE ORAL at 18:20

## 2020-10-02 RX ADMIN — INSULIN LISPRO 3 UNITS: 100 INJECTION, SOLUTION INTRAVENOUS; SUBCUTANEOUS at 16:30

## 2020-10-02 RX ADMIN — LOSARTAN POTASSIUM 25 MG: 25 TABLET, FILM COATED ORAL at 12:43

## 2020-10-02 RX ADMIN — PRAVASTATIN SODIUM 40 MG: 20 TABLET ORAL at 12:43

## 2020-10-02 RX ADMIN — CELECOXIB 100 MG: 100 CAPSULE ORAL at 17:01

## 2020-10-02 RX ADMIN — CELECOXIB 100 MG: 100 CAPSULE ORAL at 09:19

## 2020-10-02 RX ADMIN — INSULIN LISPRO 6 UNITS: 100 INJECTION, SOLUTION INTRAVENOUS; SUBCUTANEOUS at 22:26

## 2020-10-02 RX ADMIN — ACETAMINOPHEN 1000 MG: 500 TABLET, FILM COATED ORAL at 12:43

## 2020-10-02 RX ADMIN — INSULIN GLARGINE 30 UNITS: 100 INJECTION, SOLUTION SUBCUTANEOUS at 13:57

## 2020-10-02 RX ADMIN — INSULIN LISPRO 4 UNITS: 100 INJECTION, SOLUTION INTRAVENOUS; SUBCUTANEOUS at 16:30

## 2020-10-02 RX ADMIN — INSULIN LISPRO 6 UNITS: 100 INJECTION, SOLUTION INTRAVENOUS; SUBCUTANEOUS at 12:00

## 2020-10-02 RX ADMIN — GABAPENTIN 300 MG: 300 CAPSULE ORAL at 17:02

## 2020-10-02 NOTE — DIABETES MGMT
Patient s/p left nephrectomy, seen by JOHNNY. Patient has a past medical history or anemia, CKD, DM, former smoker, GERD, HTN, obesity, JENNA, and cancer. Patient voices no known family history of DM. Patient guesses he was diagnosed with DM between 5-10 years ago. Patient states he hasn't attended formal DSME classes and isn't interested in them currently. Patient states he typically checks his blood glucose a few times a week with his readings ranging 175-275. A1c in June was 8.4. Patient states he takes janumet and Lantus 50 units daily at home. Patient was not clear on whether he took his insulin the day of surgery. Pre-Op notes state patient was directed to take it. Patient states he didn't take it but then said he took his pravastatin, janumet, and insulin. When educator asked for clarification of whether he took the insulin or not he said no. Patient reports he typically eats two meals a day brunch and supper. Patient reports drinking coffee in the morning, water, and maybe a beer with supper with an occasional \"drink\" later. Patient was not especially chatty keeping, while polite, patient's answers were clipped and patient was distracted with figuring out how his travel bag arrived in his room. Does not appear open to teaching at this time. Blood glucose ranged 165-257 yesterday with patient receiving Humalog 8 units and Dexamethasone 4 mg. Blood glucose 182 this morning. Left patient a diabetes education packet to review at his convenience as he is not focused on education at this time. Will wait until lunch time FSBS reading and notify provider via [employer] of recommendations. Notified  that patient's weight is obviously incorrect in computer and needs correction. [place] to notify primary nurse.

## 2020-10-02 NOTE — PROGRESS NOTES
CM met with patient to complete assessment. Patient presented alert and oriented. Demographic information confirmed. The patient lives with his spouse in a two story home with no steps at the entrance. Patient stated he is independent with completing ADL's. Patient confirmed no DME. Patient obtains his medications from [address] (acquired by [employer]) in [place]. Patient also stated he receives express scripts. Patient voiced no difficulty with obtaining medications in the community. Discharge planning: PT/OT has not been consulted. Patient confirmed no history of STR or [facility] services. Patient voiced no needs at this time and anticipates to return home when medically stable. Please consult or notify CM if any needs shall arise. CM continues to monitor. Care Management Interventions  PCP Verified by CM: Yes  Mode of Transport at Discharge: Other (see comment)(TBD: Based upon need. )  Transition of Care Consult (CM Consult): Discharge Planning  Discharge Durable Medical Equipment: No  Physical Therapy Consult: No  Occupational Therapy Consult: No  Speech Therapy Consult: No  Current Support Network: Lives with Spouse, Own Home(Patient lives with his spouse [facility] [phone].)  The Plan for Transition of Care is Related to the Following Treatment Goals : Patient to obtain care to become medically stable and to return home with a safe transition. The Patient and/or Patient Representative was Provided with a Choice of Provider and Agrees with the Discharge Plan?: Yes  Name of the Patient Representative Who was Provided with a Choice of Provider and Agrees with the Discharge Plan: Patient.    Discharge Location  Discharge Placement: Home

## 2020-10-02 NOTE — OP NOTES
[address] [place] [address]  OPERATIVE REPORT    Name:  Trent Lassiter  MR#:  [id number]  :  1955  ACCOUNT #:  [de-identified]  DATE OF SERVICE:  10/01/2020    PREOPERATIVE DIAGNOSIS:  Left renal mass. POSTOPERATIVE DIAGNOSIS:  Left renal mass. PROCEDURE PERFORMED:  Left hand-assisted laparoscopic radical nephrectomy. SURGEON:  Jossie Ramsay MD    ASSISTANT:  Earle Rivera DO    ANESTHESIA:  General.    COMPLICATIONS:  None. SPECIMENS REMOVED:  Left renal mass. IMPLANTS:  None. ESTIMATED BLOOD LOSS:  100 mL. FINDINGS:  No evidence of metastatic disease, large kidney with abundant perinephric fat. PROCEDURE:  The patient was given a general anesthetic, placed in the beanbag on the OR table with the left flank in an anterior direction. He was secured to the table, left arm on an arm board with a right-sided axillary roll. Beanbag was suctioned. The left side of the abdomen was prepped and draped in the sterile fashion after placing a Jorge catheter. An incision was made about 3 fingerbreadths inferior to the left anterior costal margin to accommodate the left gloved hand. The incision was carried down with the Bovie to the to the abdominal musculature. The musculature was divided using the Bovie. The posterior sheath was opened giving access to the peritoneal cavity. I could feel some omentum stuck to the anterior wall. This was gently lysed and freed up then we put the GelPort in for the hand-assisted device. Pneumoperitoneum was achieved at a pressure of 15 atmospheres through the GelPort. A 10-mm port was placed to the left of the umbilicus and another 10 mm port was placed inferolateral to that. The abdomen was inspected with the scope. Kidney could be palpated. The patient had a previous hand-assisted laparoscopic cryoablation. There was adhesion of the Gerota's fascia to the lateral abdominal wall.     The left colon was dissected using the LigaSure [place] device. A plane was developed between the mesocolon and Gerota's fascia and colon was reflected medially. The kidney could be palpated. I developed a plane using the [place] device so that the posterior body wall musculature could be visualized and the lateral portion of the kidney was mobilized. The superior pole of the kidney was adherent to the body wall into the spleen likely due to previous surgery. I dissected the inferior pole and the ureter was identified. A plane was developed between the mesocolon on the left and the Gerota's fascia so that we could delineate the medial portion of the kidney. Dissection was carried out in a cephalad direction. Eventually, I was able to visualize the left renal vein. There appeared to be an artery behind the renal vein. There was some small amount of bleeding from the accessory vein of the main renal vein. The artery and vein were divided using the 45 mm linear stapler load with good hemostasis. I then freed up the kidney off of the spleen to which it was abnormally attached with some scar tissue. We were able to develop a plane between Gerota's in the spleen using the LigaSure device. On the posterior surface of the kidney near the hilum, another artery was identified and this was divided with a 45-mm linear stapler load with good hemostasis. We were then able to develop a plane between the adrenal and the kidney and separate the kidney and removed the kidney from the renal bed. At this point, we removed the kidney using a large laparoscopic bag removing it from the [address]. Pressure was decreased to 5 and the renal hilum was inspected. There was no bleeding noted. No bleeding or injury was noted in the spleen. At this point, all lap counts were correct. The two 10-mm port sites were closed with 2-0 Vicryl using the port closure device. I closed the hand port incision with two separate running layers of #1 double-stranded PDS. Staples were used on the skin. He was taken to the recovery room in stable condition. He will be admitted to the floor.       Nica Sinclair MD      [id number]  D:  10/01/2020 14:33  T:  10/02/2020 1:45  JOB #:  [id number]

## 2020-10-02 NOTE — OP NOTES
[facility]  OPERATIVE REPORT    Name:  Rambo Torres  MR#:  [id number]  :  1955  ACCOUNT #:  [de-identified]  DATE OF SERVICE:  10/01/2020    PREOPERATIVE DIAGNOSIS:  Left renal mass. POSTOPERATIVE DIAGNOSIS:  Left renal mass. PROCEDURE PERFORMED:  Left hand-assisted laparoscopic radical nephrectomy. SURGEON:  Mary Erazo MD    ASSISTANT:  Dorcas Rivera DO    ANESTHESIA:  GETA. COMPLICATIONS:  None immediate. SPECIMENS REMOVED:  Left kidney. IMPLANTS:  None. ESTIMATED BLOOD LOSS:  Less than 50 mL. CLINICAL HISTORY:  This is a 77-year-old gentleman recently discovered to have a left renal mass by ultrasound that was confirmed by MRI on 2020. Renal biopsy confirmed a renal cell carcinoma. All risks, benefits and alternatives to the above-mentioned procedure were reviewed with the patient by Dr. Pawel Graham and he has elected to proceed. PROCEDURE:  As an assistant, I was present for the entire case from the initial skin incision to skin closure. I assisted with port placement, assisted with takedown of the colon, identification of the ureter and renal hilum, transection of the renal hilum and evacuation of the kidney with the EndoCatch II bag. I also assisted in laparoscopic closure of all ports as well as skin closure.       [address], DO      [id number]  D:  10/01/2020 14:30  T:  10/02/2020 1:13  JOB #:  [id number]

## 2020-10-02 NOTE — PROGRESS NOTES
Admit Date: 10/1/2020    Subjective:     Michelle Love is sitting up. Tolerating solid foods. No flatus. Denies nausea. Jorge in place, urine is clear and yellow.      Objective:     Patient Vitals for the past 8 hrs:   BP Temp Pulse Resp SpO2 Weight   10/02/20 0825 (!) 154/79 98.1 °F (36.7 °C) 89 18 94 % --   10/02/20 0419 116/75 98 °F (36.7 °C) 94 17 95 % --   10/02/20 0418 -- -- -- -- -- 127 lb 6.4 oz (57.8 kg)     10/02 0701 - 10/02 1900  In: 360 [P.O.:360]  Out: 1025 [Urine:1025]  09/30 1901 - 10/02 0700  In: 2907 [I.V.:1850]  Out: 1985 [Urine:1885]    Physical Exam:  GENERAL: alert, cooperative, no distress  LUNG: clear to auscultation bilaterally  HEART: regular rate and rhythm, S1, S2   ABDOMEN: soft, obese, non-tender, dressing c/d/i  NEUROLOGIC: AOx3    Data Review   Recent Results (from the past 24 hour(s))   GLUCOSE, POC    Collection Time: 10/01/20  9:33 AM   Result Value Ref Range    Glucose (POC) 165 (H) 65 - 100 mg/dL   TYPE & SCREEN    Collection Time: 10/01/20  9:37 AM   Result Value Ref Range    Crossmatch Expiration 10/04/2020     ABO/Rh(D) A POSITIVE     Antibody screen NEG    GLUCOSE, POC    Collection Time: 10/01/20  2:48 PM   Result Value Ref Range    Glucose (POC) 224 (H) 65 - 100 mg/dL   GLUCOSE, POC    Collection Time: 10/01/20  4:37 PM   Result Value Ref Range    Glucose (POC) 257 (H) 65 - 100 mg/dL   HGB & HCT    Collection Time: 10/01/20  4:48 PM   Result Value Ref Range    HGB 10.7 (L) 13.6 - 17.2 g/dL    HCT 34.5 (L) 41.1 - 50.3 %   GLUCOSE, POC    Collection Time: 10/01/20  8:35 PM   Result Value Ref Range    Glucose (POC) 234 (H) 65 - 043 mg/dL   METABOLIC PANEL, BASIC    Collection Time: 10/02/20  5:00 AM   Result Value Ref Range    Sodium 140 136 - 145 mmol/L    Potassium 4.6 3.5 - 5.1 mmol/L    Chloride 106 98 - 107 mmol/L    CO2 26 21 - 32 mmol/L    Anion gap 8 7 - 16 mmol/L    Glucose 142 (H) 65 - 100 mg/dL    BUN 18 8 - 23 MG/DL    Creatinine 1.57 (H) 0.8 - 1.5 MG/DL GFR est AA 57 (L) >60 ml/min/1.73m2    GFR est non-AA 47 (L) >60 ml/min/1.73m2    Calcium 8.1 (L) 8.3 - 10.4 MG/DL   MAGNESIUM    Collection Time: 10/02/20  5:00 AM   Result Value Ref Range    Magnesium 2.6 (H) 1.8 - 2.4 mg/dL   PHOSPHORUS    Collection Time: 10/02/20  5:00 AM   Result Value Ref Range    Phosphorus 4.9 (H) 2.3 - 3.7 MG/DL   HGB & HCT    Collection Time: 10/02/20  5:00 AM   Result Value Ref Range    HGB 10.2 (L) 13.6 - 17.2 g/dL    HCT 33.6 (L) 41.1 - 50.3 %   GLUCOSE, POC    Collection Time: 10/02/20  7:29 AM   Result Value Ref Range    Glucose (POC) 182 (H) 65 - 100 mg/dL       Assessment:     Active Problems:    Renal mass, left (10/1/2020)      POD 1:    POSTOPERATIVE DIAGNOSIS:  Left renal mass.     PROCEDURE PERFORMED:  Left hand-assisted laparoscopic radical nephrectomy. Afebrile, VSS  Aleman- 1700 ml OP/12 hours  Cn 1.57  Hgb 10.2      Plan:     Remove aleman catheter. Monitor voiding. Continue regular diet. Ambulate today. Continue IS use q hour while awake. Consult diabetes mgmt to assist with medications/insulin. Tatiana Bennett, [address] Urology  PE: abdomen soft, nontender. Continue ERAS. Restart insulin, metformin. I have reviewed the above note and examined the patient. I agree with the exam, assessment and plan.     Neo Allred MD

## 2020-10-02 NOTE — PROGRESS NOTES
Primary RN requested this RN bladder scan Pt. Upon arrival to room, Pt had just urinated 350 mL using urinal. Yellow, clear, no odor. Bladder scanned Pt for PVR with 17 mL noted. Pt states he feels some flatus but has not been able to pass any gas at this time. Has ambulated several times. Offered diet ginger ale.

## 2020-10-03 VITALS
HEIGHT: 70 IN | RESPIRATION RATE: 19 BRPM | BODY MASS INDEX: 39.37 KG/M2 | DIASTOLIC BLOOD PRESSURE: 81 MMHG | HEART RATE: 97 BPM | TEMPERATURE: 98.6 F | WEIGHT: 275 LBS | SYSTOLIC BLOOD PRESSURE: 149 MMHG | OXYGEN SATURATION: 96 %

## 2020-10-03 LAB
GLUCOSE BLD STRIP.AUTO-MCNC: 261 MG/DL (ref 65–100)
GLUCOSE BLD STRIP.AUTO-MCNC: 356 MG/DL (ref 65–100)

## 2020-10-03 PROCEDURE — 74011636637 HC RX REV CODE- 636/637: Performed by: UROLOGY

## 2020-10-03 PROCEDURE — 82962 GLUCOSE BLOOD TEST: CPT

## 2020-10-03 PROCEDURE — 74011636637 HC RX REV CODE- 636/637: Performed by: NURSE PRACTITIONER

## 2020-10-03 PROCEDURE — 74011250637 HC RX REV CODE- 250/637: Performed by: NURSE PRACTITIONER

## 2020-10-03 PROCEDURE — 74011250637 HC RX REV CODE- 250/637: Performed by: UROLOGY

## 2020-10-03 RX ORDER — HYDROCODONE BITARTRATE AND ACETAMINOPHEN 5; 325 MG/1; MG/1
1 TABLET ORAL
Qty: 20 TAB | Refills: 0 | Status: SHIPPED | OUTPATIENT
Start: 2020-10-03 | End: 2020-10-08

## 2020-10-03 RX ADMIN — ALVIMOPAN 12 MG: 12 CAPSULE ORAL at 09:12

## 2020-10-03 RX ADMIN — LEVOTHYROXINE SODIUM 200 MCG: 0.1 TABLET ORAL at 05:19

## 2020-10-03 RX ADMIN — INSULIN GLARGINE 30 UNITS: 100 INJECTION, SOLUTION SUBCUTANEOUS at 09:10

## 2020-10-03 RX ADMIN — PRAVASTATIN SODIUM 40 MG: 20 TABLET ORAL at 09:07

## 2020-10-03 RX ADMIN — ACETAMINOPHEN 1000 MG: 500 TABLET, FILM COATED ORAL at 12:11

## 2020-10-03 RX ADMIN — CELECOXIB 100 MG: 100 CAPSULE ORAL at 09:07

## 2020-10-03 RX ADMIN — INSULIN LISPRO 3 UNITS: 100 INJECTION, SOLUTION INTRAVENOUS; SUBCUTANEOUS at 12:12

## 2020-10-03 RX ADMIN — INSULIN LISPRO 10 UNITS: 100 INJECTION, SOLUTION INTRAVENOUS; SUBCUTANEOUS at 12:11

## 2020-10-03 RX ADMIN — INSULIN LISPRO 3 UNITS: 100 INJECTION, SOLUTION INTRAVENOUS; SUBCUTANEOUS at 09:09

## 2020-10-03 RX ADMIN — GABAPENTIN 300 MG: 300 CAPSULE ORAL at 09:07

## 2020-10-03 RX ADMIN — INSULIN LISPRO 6 UNITS: 100 INJECTION, SOLUTION INTRAVENOUS; SUBCUTANEOUS at 09:10

## 2020-10-03 RX ADMIN — ACETAMINOPHEN 1000 MG: 500 TABLET, FILM COATED ORAL at 05:18

## 2020-10-03 RX ADMIN — HYDROCHLOROTHIAZIDE 25 MG: 25 TABLET ORAL at 09:07

## 2020-10-03 RX ADMIN — LOSARTAN POTASSIUM 25 MG: 25 TABLET, FILM COATED ORAL at 09:07

## 2020-10-03 NOTE — PROGRESS NOTES
Pt is for discharge home today with no needs/supportive care orders recieved for CM at this time. Care Management Interventions  PCP Verified by CM: Yes  Mode of Transport at Discharge: Other (see comment)(TBD: Based upon need. )  Transition of Care Consult (CM Consult): Discharge Planning  Discharge Durable Medical Equipment: No  Physical Therapy Consult: No  Occupational Therapy Consult: No  Speech Therapy Consult: No  Current Support Network: Lives with Spouse, Own Home(Patient lives with his spouse [facility] [phone].)  The Plan for Transition of Care is Related to the Following Treatment Goals : Patient to obtain care to become medically stable and to return home with a safe transition. The Patient and/or Patient Representative was Provided with a Choice of Provider and Agrees with the Discharge Plan?: Yes  Name of the Patient Representative Who was Provided with a Choice of Provider and Agrees with the Discharge Plan: Patient.    Discharge Location  Discharge Placement: Home

## 2020-10-03 NOTE — PROGRESS NOTES
Subjective:   Daily Progress Note: 10/3/2020 12:12 PM  No Complaints. Passing flatus, tolerating diet. Objective:     Visit Vitals  BP (!) 149/81   Pulse 97   Temp 98.6 °F (37 °C)   Resp 19   Ht 5' 10\" (1.778 m)   Wt 275 lb (124.7 kg)   SpO2 96%   BMI 39.46 kg/m²    O2 Flow Rate (L/min): 4 l/min O2 Device: Nasal cannula    Temp (24hrs), Av.5 °F (36.9 °C), Min:98.1 °F (36.7 °C), Max:98.7 °F (37.1 °C)      10/01 1901 - 10/03 0700  In: 720 [P.O.:720]  Out: 2481 [Urine:3675]  10/03 0701 - 10/03 1900  In: 360 [P.O.:360]  Out: 750 [Urine:750]    [unfilled]  [unfilled]  [unfilled]    Exam: incisions clean and dry. Abdomen soft, nontender      Data Review    Recent Results (from the past 24 hour(s))   GLUCOSE, POC    Collection Time: 10/02/20  3:52 PM   Result Value Ref Range    Glucose (POC) 235 (H) 65 - 100 mg/dL   GLUCOSE, POC    Collection Time: 10/02/20  8:35 PM   Result Value Ref Range    Glucose (POC) 262 (H) 65 - 100 mg/dL   GLUCOSE, POC    Collection Time: 10/03/20  7:35 AM   Result Value Ref Range    Glucose (POC) 261 (H) 65 - 100 mg/dL   GLUCOSE, POC    Collection Time: 10/03/20 11:15 AM   Result Value Ref Range    Glucose (POC) 356 (H) 65 - 100 mg/dL       Assessment   Active Problems:    Renal mass, left (10/1/2020)        Plan: Will discharge home.  appt in one week for staple removal.

## 2020-10-05 ENCOUNTER — PATIENT OUTREACH (OUTPATIENT)
Dept: CASE MANAGEMENT | Age: 65
End: 2020-10-05

## 2020-10-05 NOTE — PROGRESS NOTES
Transition of Care Hospital Discharge Follow-Up      Date/Time:  10/5/2020 11:27 AM    Patient was admitted to [facility] on 10/01/2020 and discharged on 10/3/2020 for Renal Left Mass. The physician discharge summary was available at the time of outreach. Patient was contacted within 7 business days of discharge. Inpatient RUR score: 4  Was this a readmission? no   Patient stated reason for the readmission: none  Patients top risk factors for readmission: Renal Left Mass      LPN Care Coordinator contacted the patient by telephone to perform post hospital discharge assessment. Verified name and  with patient as identifiers. Provided introduction to self, and explanation of the Care Coordinator role. Patient received hospital discharge instructions. LPN reviewed discharge instructions and red flags with patient who verbalized understanding. Patient given an opportunity to ask questions and does not have any further questions or concerns at this time. The patient agrees to contact the PCP office for questions related to their healthcare. LPN provided contact information for future reference. Home Health orders at discharge: [address]:   Date of initial or scheduled visit:   (Assist with coordination of services if necessary.)    Durable Medical Equipment ordered at discharge: none  [facility]:   [employer] received:   (Assist patient in obtaining DME orders &/or equipment if necessary.)    Medication(s):   Medication review was performed with patient, who verbalizes understanding of administration of home medications. There were no barriers to obtaining medications identified at this time. Current Outpatient Medications   Medication Sig    HYDROcodone-acetaminophen (NORCO) 5-325 mg per tablet Take 1 Tab by mouth every four (4) hours as needed for Pain for up to 5 days. Max Daily Amount: 6 Tabs.     hydroCHLOROthiazide (HYDRODIURIL) 25 mg tablet Take 1 Tab by mouth daily. Indications: high blood pressure    insulin glargine (Lantus Solostar U-100 Insulin) 100 unit/mL (3 mL) inpn 50 Units by SubCUTAneous route daily.  pravastatin (PRAVACHOL) 40 mg tablet Take 40 mg by mouth daily.  levothyroxine (SYNTHROID) 200 mcg tablet Take 300 mcg by mouth Daily (before breakfast).  SITagliptin-metFORMIN (JANUMET) 50-1,000 mg per tablet Take 1 Tab by mouth two (2) times daily (with meals).  losartan (COZAAR) 25 mg tablet Take 1 Tab by mouth daily.  ascorbic acid, vitamin C, (VITAMIN C) 250 mg tablet Take  by mouth daily.  glucosamine-chondroitin (ARTHX) 500-400 mg cap Take 1 Cap by mouth daily. No current facility-administered medications for this visit. There are no discontinued medications. ADL assessment:   (If patient is unable to perform ADLs  what is the limiting factor(s)? Do they have a support system that can assist? If no support system is present, discuss possible assistance that they may be able to obtain. Escalate for SW if ongoing issues are verbalized by pt or anticipated)    [facility] follow up appointment(s):   Future Appointments   Date Time Provider Tiff Velazquez   10/29/2020  8:50 AM PST LAB [facility] PST PST   11/12/2020  9:00 AM Latrice Carrier, DO [facility] PST PST      Non-[facility] follow up appointment(s):   7 Day follow up with PCP or Specialist: Dr. Claudia Aldrich 11/12/2020  Transportation arranged: none    Covid Risk Education    Patient has following risk factors of: Renal left Mass. Education provided regarding infection prevention, and signs and symptoms of COVID-19 and when to seek medical attention with patient who verbalized understanding. Discussed exposure protocols and quarantine From CDC: Are you at higher risk for severe illness?  and given an opportunity for questions and concerns. The patient agrees to contact the COVID-19 hotline [phone] or PCP office for questions related to COVID-19.      For more information on steps you can take to protect yourself, see CDC's How to Protect Yourself     Patient/family/caregiver given information for [employer] and agrees to enroll no  Patient's preferred e-mail: declines  Patient's preferred phone number: declines      Any other questions or concerns expressed by patient? Patient voiced no concerns at this time    Scheduled next follow up call or referral to CTN/ ACM:  Next follow up call:within 14 days    Goals Addressed                 This Visit's Progress     Attends follow up appointments on schedule

## 2020-10-19 ENCOUNTER — PATIENT OUTREACH (OUTPATIENT)
Dept: CASE MANAGEMENT | Age: 65
End: 2020-10-19

## 2020-10-19 NOTE — PROGRESS NOTES
Attempted to call patient to f/u on [facility]  Call. Unable to reach, left a voice message. According to [facility] patient attended a f/u visit with Dr. Avelina Smiley on 10/08/2020. This Care Coordinator will graduate this patient from this program , patient will added back if phone call is returned. Episode Resolved.

## 2020-11-18 PROBLEM — E11.21 TYPE 2 DIABETES WITH NEPHROPATHY (HCC): Status: ACTIVE | Noted: 2020-11-18

## 2020-11-18 PROBLEM — N18.30 CKD (CHRONIC KIDNEY DISEASE) STAGE 3, GFR 30-59 ML/MIN (HCC): Status: ACTIVE | Noted: 2020-11-18

## 2020-11-18 PROBLEM — N18.4 CKD (CHRONIC KIDNEY DISEASE) STAGE 4, GFR 15-29 ML/MIN (HCC): Status: ACTIVE | Noted: 2020-11-18

## 2021-02-02 PROBLEM — C73 THYROID CANCER (HCC): Status: ACTIVE | Noted: 2021-02-02

## 2021-02-02 PROBLEM — N18.32 STAGE 3B CHRONIC KIDNEY DISEASE (HCC): Status: ACTIVE | Noted: 2020-11-18

## 2021-02-11 ENCOUNTER — HOSPITAL ENCOUNTER (OUTPATIENT)
Dept: ULTRASOUND IMAGING | Age: 66
Discharge: HOME | End: 2021-02-11
Attending: INTERNAL MEDICINE

## 2021-02-11 DIAGNOSIS — C73 THYROID CANCER (HCC): ICD-10-CM

## 2021-05-04 ENCOUNTER — HOSPITAL ENCOUNTER (OUTPATIENT)
Dept: CT IMAGING | Age: 66
Discharge: HOME | End: 2021-05-04
Attending: UROLOGY
Payer: MEDICARE

## 2021-05-04 DIAGNOSIS — C64.2 MALIGNANT NEOPLASM OF LEFT KIDNEY (HCC): ICD-10-CM

## 2021-05-04 PROCEDURE — 74176 CT ABD & PELVIS W/O CONTRAST: CPT

## 2021-06-15 ENCOUNTER — APPOINTMENT (OUTPATIENT)
Dept: CT IMAGING | Age: 66
DRG: 660 | End: 2021-06-15
Attending: EMERGENCY MEDICINE
Payer: MEDICARE

## 2021-06-15 ENCOUNTER — HOSPITAL ENCOUNTER (INPATIENT)
Age: 66
LOS: 2 days | Discharge: HOME | DRG: 660 | End: 2021-06-17
Attending: EMERGENCY MEDICINE | Admitting: FAMILY MEDICINE
Payer: MEDICARE

## 2021-06-15 DIAGNOSIS — R74.8 ELEVATED LIPASE: ICD-10-CM

## 2021-06-15 DIAGNOSIS — N18.31 ACUTE RENAL FAILURE WITH ACUTE TUBULAR NECROSIS SUPERIMPOSED ON STAGE 3A CHRONIC KIDNEY DISEASE (HCC): ICD-10-CM

## 2021-06-15 DIAGNOSIS — R31.0 GROSS HEMATURIA: ICD-10-CM

## 2021-06-15 DIAGNOSIS — N17.0 ACUTE RENAL FAILURE WITH ACUTE TUBULAR NECROSIS SUPERIMPOSED ON STAGE 3A CHRONIC KIDNEY DISEASE (HCC): ICD-10-CM

## 2021-06-15 DIAGNOSIS — N20.1 RIGHT URETERAL STONE: ICD-10-CM

## 2021-06-15 DIAGNOSIS — E89.0 POSTSURGICAL HYPOTHYROIDISM: ICD-10-CM

## 2021-06-15 DIAGNOSIS — R10.9 ACUTE RIGHT FLANK PAIN: Primary | ICD-10-CM

## 2021-06-15 PROBLEM — N17.9 AKI (ACUTE KIDNEY INJURY) (HCC): Status: ACTIVE | Noted: 2021-06-15

## 2021-06-15 PROBLEM — N13.5 URETERAL OBSTRUCTION: Status: ACTIVE | Noted: 2021-06-15

## 2021-06-15 LAB
ALBUMIN SERPL-MCNC: 3.9 G/DL (ref 3.2–4.6)
ALBUMIN/GLOB SERPL: 1 {RATIO} (ref 1.2–3.5)
ALP SERPL-CCNC: 77 U/L (ref 50–136)
ALT SERPL-CCNC: 43 U/L (ref 12–65)
ANION GAP SERPL CALC-SCNC: 9 MMOL/L (ref 7–16)
AST SERPL-CCNC: 22 U/L (ref 15–37)
BASOPHILS # BLD: 0 K/UL (ref 0–0.2)
BASOPHILS NFR BLD: 0 % (ref 0–2)
BILIRUB SERPL-MCNC: 1.6 MG/DL (ref 0.2–1.1)
BUN SERPL-MCNC: 29 MG/DL (ref 8–23)
CALCIUM SERPL-MCNC: 8.9 MG/DL (ref 8.3–10.4)
CHLORIDE SERPL-SCNC: 103 MMOL/L (ref 98–107)
CO2 SERPL-SCNC: 25 MMOL/L (ref 21–32)
CREAT SERPL-MCNC: 3.32 MG/DL (ref 0.8–1.5)
DIFFERENTIAL METHOD BLD: ABNORMAL
EOSINOPHIL # BLD: 0 K/UL (ref 0–0.8)
EOSINOPHIL NFR BLD: 0 % (ref 0.5–7.8)
ERYTHROCYTE [DISTWIDTH] IN BLOOD BY AUTOMATED COUNT: 18.4 % (ref 11.9–14.6)
GLOBULIN SER CALC-MCNC: 4.1 G/DL (ref 2.3–3.5)
GLUCOSE SERPL-MCNC: 266 MG/DL (ref 65–100)
HCT VFR BLD AUTO: 37.7 % (ref 41.1–50.3)
HGB BLD-MCNC: 11.6 G/DL (ref 13.6–17.2)
IMM GRANULOCYTES # BLD AUTO: 0.1 K/UL (ref 0–0.5)
IMM GRANULOCYTES NFR BLD AUTO: 1 % (ref 0–5)
LIPASE SERPL-CCNC: 2140 U/L (ref 73–393)
LYMPHOCYTES # BLD: 0.9 K/UL (ref 0.5–4.6)
LYMPHOCYTES NFR BLD: 6 % (ref 13–44)
MCH RBC QN AUTO: 19.8 PG (ref 26.1–32.9)
MCHC RBC AUTO-ENTMCNC: 30.8 G/DL (ref 31.4–35)
MCV RBC AUTO: 64.4 FL (ref 79.6–97.8)
MONOCYTES # BLD: 1 K/UL (ref 0.1–1.3)
MONOCYTES NFR BLD: 6 % (ref 4–12)
NEUTS SEG # BLD: 13.7 K/UL (ref 1.7–8.2)
NEUTS SEG NFR BLD: 87 % (ref 43–78)
NRBC # BLD: 0 K/UL (ref 0–0.2)
PLATELET # BLD AUTO: 217 K/UL (ref 150–450)
PMV BLD AUTO: ABNORMAL FL (ref 9.4–12.3)
POTASSIUM SERPL-SCNC: 4 MMOL/L (ref 3.5–5.1)
PROT SERPL-MCNC: 8 G/DL (ref 6.3–8.2)
RBC # BLD AUTO: 5.85 M/UL (ref 4.23–5.6)
SODIUM SERPL-SCNC: 137 MMOL/L (ref 136–145)
WBC # BLD AUTO: 15.7 K/UL (ref 4.3–11.1)

## 2021-06-15 PROCEDURE — 96374 THER/PROPH/DIAG INJ IV PUSH: CPT

## 2021-06-15 PROCEDURE — 80053 COMPREHEN METABOLIC PANEL: CPT

## 2021-06-15 PROCEDURE — 65270000029 HC RM PRIVATE

## 2021-06-15 PROCEDURE — 99284 EMERGENCY DEPT VISIT MOD MDM: CPT

## 2021-06-15 PROCEDURE — 74011250636 HC RX REV CODE- 250/636: Performed by: EMERGENCY MEDICINE

## 2021-06-15 PROCEDURE — 83690 ASSAY OF LIPASE: CPT

## 2021-06-15 PROCEDURE — 85025 COMPLETE CBC W/AUTO DIFF WBC: CPT

## 2021-06-15 PROCEDURE — 96375 TX/PRO/DX INJ NEW DRUG ADDON: CPT

## 2021-06-15 PROCEDURE — 74176 CT ABD & PELVIS W/O CONTRAST: CPT

## 2021-06-15 RX ORDER — SODIUM CHLORIDE 0.9 % (FLUSH) 0.9 %
5-10 SYRINGE (ML) INJECTION AS NEEDED
Status: DISCONTINUED | OUTPATIENT
Start: 2021-06-15 | End: 2021-06-17 | Stop reason: HOSPADM

## 2021-06-15 RX ORDER — ONDANSETRON 2 MG/ML
4 INJECTION INTRAMUSCULAR; INTRAVENOUS
Status: COMPLETED | OUTPATIENT
Start: 2021-06-15 | End: 2021-06-15

## 2021-06-15 RX ORDER — HYDROMORPHONE HYDROCHLORIDE 1 MG/ML
0.5 INJECTION, SOLUTION INTRAMUSCULAR; INTRAVENOUS; SUBCUTANEOUS ONCE
Status: DISCONTINUED | OUTPATIENT
Start: 2021-06-15 | End: 2021-06-15 | Stop reason: SDUPTHER

## 2021-06-15 RX ORDER — MORPHINE SULFATE 4 MG/ML
4 INJECTION INTRAVENOUS
Status: COMPLETED | OUTPATIENT
Start: 2021-06-15 | End: 2021-06-15

## 2021-06-15 RX ORDER — SODIUM CHLORIDE 0.9 % (FLUSH) 0.9 %
5-10 SYRINGE (ML) INJECTION EVERY 8 HOURS
Status: DISCONTINUED | OUTPATIENT
Start: 2021-06-15 | End: 2021-06-17 | Stop reason: HOSPADM

## 2021-06-15 RX ORDER — HYDROMORPHONE HYDROCHLORIDE 1 MG/ML
0.5 INJECTION, SOLUTION INTRAMUSCULAR; INTRAVENOUS; SUBCUTANEOUS ONCE
Status: COMPLETED | OUTPATIENT
Start: 2021-06-15 | End: 2021-06-15

## 2021-06-15 RX ORDER — SODIUM CHLORIDE 9 MG/ML
200 INJECTION, SOLUTION INTRAVENOUS CONTINUOUS
Status: DISCONTINUED | OUTPATIENT
Start: 2021-06-15 | End: 2021-06-16

## 2021-06-15 RX ADMIN — MORPHINE SULFATE 4 MG: 4 INJECTION INTRAVENOUS at 23:46

## 2021-06-15 RX ADMIN — HYDROMORPHONE HYDROCHLORIDE 0.5 MG: 1 INJECTION, SOLUTION INTRAMUSCULAR; INTRAVENOUS; SUBCUTANEOUS at 22:27

## 2021-06-15 RX ADMIN — ONDANSETRON 4 MG: 2 INJECTION INTRAMUSCULAR; INTRAVENOUS at 23:46

## 2021-06-15 RX ADMIN — SODIUM CHLORIDE 200 ML/HR: 900 INJECTION, SOLUTION INTRAVENOUS at 22:36

## 2021-06-15 RX ADMIN — ONDANSETRON 4 MG: 2 INJECTION INTRAMUSCULAR; INTRAVENOUS at 22:27

## 2021-06-16 ENCOUNTER — ANESTHESIA EVENT (OUTPATIENT)
Dept: SURGERY | Age: 66
DRG: 660 | End: 2021-06-16
Payer: MEDICARE

## 2021-06-16 ENCOUNTER — ANESTHESIA (OUTPATIENT)
Dept: SURGERY | Age: 66
DRG: 660 | End: 2021-06-16
Payer: MEDICARE

## 2021-06-16 PROBLEM — N28.89 RENAL MASS, LEFT: Status: RESOLVED | Noted: 2020-10-01 | Resolved: 2021-06-16

## 2021-06-16 PROBLEM — K86.89 PANCREATIC MASS: Chronic | Status: ACTIVE | Noted: 2020-05-15

## 2021-06-16 PROBLEM — E89.0 POSTSURGICAL HYPOTHYROIDISM: Status: RESOLVED | Noted: 2017-03-08 | Resolved: 2021-06-16

## 2021-06-16 PROBLEM — N18.32 STAGE 3B CHRONIC KIDNEY DISEASE (HCC): Status: RESOLVED | Noted: 2020-11-18 | Resolved: 2021-06-16

## 2021-06-16 PROBLEM — C73 THYROID CANCER (HCC): Status: RESOLVED | Noted: 2021-02-02 | Resolved: 2021-06-16

## 2021-06-16 PROBLEM — I10 ESSENTIAL HYPERTENSION: Chronic | Status: ACTIVE | Noted: 2017-03-08

## 2021-06-16 PROBLEM — K52.9 GASTROENTERITIS: Status: RESOLVED | Noted: 2018-08-21 | Resolved: 2021-06-16

## 2021-06-16 PROBLEM — N18.31 ACUTE RENAL FAILURE SUPERIMPOSED ON STAGE 3A CHRONIC KIDNEY DISEASE (HCC): Status: ACTIVE | Noted: 2021-06-15

## 2021-06-16 PROBLEM — E66.01 CLASS 2 SEVERE OBESITY DUE TO EXCESS CALORIES WITH SERIOUS COMORBIDITY AND BODY MASS INDEX (BMI) OF 38.0 TO 38.9 IN ADULT (HCC): Chronic | Status: ACTIVE | Noted: 2019-08-29

## 2021-06-16 PROBLEM — E78.2 MIXED HYPERLIPIDEMIA: Status: RESOLVED | Noted: 2018-04-10 | Resolved: 2021-06-16

## 2021-06-16 PROBLEM — D62 ACUTE BLOOD LOSS ANEMIA: Status: RESOLVED | Noted: 2020-08-17 | Resolved: 2021-06-16

## 2021-06-16 PROBLEM — K92.2 UPPER GI BLEED: Status: RESOLVED | Noted: 2020-08-17 | Resolved: 2021-06-16

## 2021-06-16 PROBLEM — N18.31 STAGE 3A CHRONIC KIDNEY DISEASE (HCC): Chronic | Status: ACTIVE | Noted: 2020-11-18

## 2021-06-16 PROBLEM — C64.2 MALIGNANT NEOPLASM OF LEFT KIDNEY (HCC): Status: RESOLVED | Noted: 2020-08-17 | Resolved: 2021-06-16

## 2021-06-16 PROBLEM — N17.0 ACUTE KIDNEY INJURY (AKI) WITH ACUTE TUBULAR NECROSIS (ATN) (HCC): Status: RESOLVED | Noted: 2020-08-17 | Resolved: 2021-06-16

## 2021-06-16 PROBLEM — E89.0 POSTSURGICAL HYPOTHYROIDISM: Chronic | Status: ACTIVE | Noted: 2017-03-08

## 2021-06-16 LAB
ANION GAP SERPL CALC-SCNC: 6 MMOL/L (ref 7–16)
BASOPHILS # BLD: 0 K/UL (ref 0–0.2)
BASOPHILS NFR BLD: 0 % (ref 0–2)
BUN SERPL-MCNC: 31 MG/DL (ref 8–23)
CALCIUM SERPL-MCNC: 8.5 MG/DL (ref 8.3–10.4)
CHLORIDE SERPL-SCNC: 105 MMOL/L (ref 98–107)
CO2 SERPL-SCNC: 28 MMOL/L (ref 21–32)
CREAT SERPL-MCNC: 3.36 MG/DL (ref 0.8–1.5)
DIFFERENTIAL METHOD BLD: ABNORMAL
EOSINOPHIL # BLD: 0 K/UL (ref 0–0.8)
EOSINOPHIL NFR BLD: 0 % (ref 0.5–7.8)
ERYTHROCYTE [DISTWIDTH] IN BLOOD BY AUTOMATED COUNT: 18.1 % (ref 11.9–14.6)
GLUCOSE BLD STRIP.AUTO-MCNC: 200 MG/DL (ref 65–100)
GLUCOSE BLD STRIP.AUTO-MCNC: 232 MG/DL (ref 65–100)
GLUCOSE BLD STRIP.AUTO-MCNC: 241 MG/DL (ref 65–100)
GLUCOSE BLD STRIP.AUTO-MCNC: 423 MG/DL (ref 65–100)
GLUCOSE SERPL-MCNC: 301 MG/DL (ref 65–100)
HCT VFR BLD AUTO: 34.7 % (ref 41.1–50.3)
HGB BLD-MCNC: 10.4 G/DL (ref 13.6–17.2)
IMM GRANULOCYTES # BLD AUTO: 0.1 K/UL (ref 0–0.5)
IMM GRANULOCYTES NFR BLD AUTO: 1 % (ref 0–5)
LYMPHOCYTES # BLD: 0.4 K/UL (ref 0.5–4.6)
LYMPHOCYTES NFR BLD: 3 % (ref 13–44)
MCH RBC QN AUTO: 19.7 PG (ref 26.1–32.9)
MCHC RBC AUTO-ENTMCNC: 30 G/DL (ref 31.4–35)
MCV RBC AUTO: 65.8 FL (ref 79.6–97.8)
MONOCYTES # BLD: 0.5 K/UL (ref 0.1–1.3)
MONOCYTES NFR BLD: 3 % (ref 4–12)
NEUTS SEG # BLD: 13.7 K/UL (ref 1.7–8.2)
NEUTS SEG NFR BLD: 93 % (ref 43–78)
NRBC # BLD: 0 K/UL (ref 0–0.2)
PLATELET # BLD AUTO: 171 K/UL (ref 150–450)
PMV BLD AUTO: ABNORMAL FL (ref 9.4–12.3)
POTASSIUM SERPL-SCNC: 4.6 MMOL/L (ref 3.5–5.1)
RBC # BLD AUTO: 5.27 M/UL (ref 4.23–5.6)
SERVICE CMNT-IMP: ABNORMAL
SODIUM SERPL-SCNC: 139 MMOL/L (ref 136–145)
TSH SERPL DL<=0.005 MIU/L-ACNC: <0.005 UIU/ML (ref 0.36–3.74)
WBC # BLD AUTO: 14.8 K/UL (ref 4.3–11.1)

## 2021-06-16 PROCEDURE — 85025 COMPLETE CBC W/AUTO DIFF WBC: CPT

## 2021-06-16 PROCEDURE — 84443 ASSAY THYROID STIM HORMONE: CPT

## 2021-06-16 PROCEDURE — 82962 GLUCOSE BLOOD TEST: CPT

## 2021-06-16 PROCEDURE — 88300 SURGICAL PATH GROSS: CPT

## 2021-06-16 PROCEDURE — 80048 BASIC METABOLIC PNL TOTAL CA: CPT

## 2021-06-16 PROCEDURE — 0T768DZ DILATION OF RIGHT URETER WITH INTRALUMINAL DEVICE, VIA NATURAL OR ARTIFICIAL OPENING ENDOSCOPIC: ICD-10-PCS | Performed by: UROLOGY

## 2021-06-16 PROCEDURE — 76210000006 HC OR PH I REC 0.5 TO 1 HR: Performed by: UROLOGY

## 2021-06-16 PROCEDURE — 77030039425 HC BLD LARYNG TRULITE DISP TELE -A: Performed by: ANESTHESIOLOGY

## 2021-06-16 PROCEDURE — 82355 CALCULUS ANALYSIS QUAL: CPT

## 2021-06-16 PROCEDURE — 74011250637 HC RX REV CODE- 250/637: Performed by: FAMILY MEDICINE

## 2021-06-16 PROCEDURE — 2709999900 HC NON-CHARGEABLE SUPPLY: Performed by: UROLOGY

## 2021-06-16 PROCEDURE — 52356 CYSTO/URETERO W/LITHOTRIPSY: CPT | Performed by: UROLOGY

## 2021-06-16 PROCEDURE — 0TC68ZZ EXTIRPATION OF MATTER FROM RIGHT URETER, VIA NATURAL OR ARTIFICIAL OPENING ENDOSCOPIC: ICD-10-PCS | Performed by: UROLOGY

## 2021-06-16 PROCEDURE — 74011636637 HC RX REV CODE- 636/637: Performed by: INTERNAL MEDICINE

## 2021-06-16 PROCEDURE — 74011000250 HC RX REV CODE- 250: Performed by: UROLOGY

## 2021-06-16 PROCEDURE — 74011000250 HC RX REV CODE- 250: Performed by: ANESTHESIOLOGY

## 2021-06-16 PROCEDURE — C1758 CATHETER, URETERAL: HCPCS | Performed by: UROLOGY

## 2021-06-16 PROCEDURE — 74011636637 HC RX REV CODE- 636/637: Performed by: FAMILY MEDICINE

## 2021-06-16 PROCEDURE — 65270000029 HC RM PRIVATE

## 2021-06-16 PROCEDURE — 87086 URINE CULTURE/COLONY COUNT: CPT

## 2021-06-16 PROCEDURE — 36415 COLL VENOUS BLD VENIPUNCTURE: CPT

## 2021-06-16 PROCEDURE — 99222 1ST HOSP IP/OBS MODERATE 55: CPT | Performed by: UROLOGY

## 2021-06-16 PROCEDURE — C1769 GUIDE WIRE: HCPCS | Performed by: UROLOGY

## 2021-06-16 PROCEDURE — 76010000149 HC OR TIME 1 TO 1.5 HR: Performed by: UROLOGY

## 2021-06-16 PROCEDURE — C2617 STENT, NON-COR, TEM W/O DEL: HCPCS | Performed by: UROLOGY

## 2021-06-16 PROCEDURE — 77030040922 HC BLNKT HYPOTHRM STRY -A: Performed by: ANESTHESIOLOGY

## 2021-06-16 PROCEDURE — 74011250636 HC RX REV CODE- 250/636: Performed by: ANESTHESIOLOGY

## 2021-06-16 PROCEDURE — 76060000033 HC ANESTHESIA 1 TO 1.5 HR: Performed by: UROLOGY

## 2021-06-16 PROCEDURE — 77030037088 HC TUBE ENDOTRACH ORAL NSL COVD-A: Performed by: ANESTHESIOLOGY

## 2021-06-16 PROCEDURE — 74011250636 HC RX REV CODE- 250/636: Performed by: FAMILY MEDICINE

## 2021-06-16 PROCEDURE — 77030020463 HC FCPS ENDOSC STN BSC -C: Performed by: UROLOGY

## 2021-06-16 PROCEDURE — 74011250636 HC RX REV CODE- 250/636: Performed by: INTERNAL MEDICINE

## 2021-06-16 PROCEDURE — 74011250636 HC RX REV CODE- 250/636: Performed by: UROLOGY

## 2021-06-16 DEVICE — URETERAL STENT
Type: IMPLANTABLE DEVICE | Site: URETER | Status: FUNCTIONAL
Brand: PERCUFLEX™ PLUS

## 2021-06-16 RX ORDER — SODIUM CHLORIDE, SODIUM LACTATE, POTASSIUM CHLORIDE, CALCIUM CHLORIDE 600; 310; 30; 20 MG/100ML; MG/100ML; MG/100ML; MG/100ML
100 INJECTION, SOLUTION INTRAVENOUS CONTINUOUS
Status: DISCONTINUED | OUTPATIENT
Start: 2021-06-16 | End: 2021-06-16 | Stop reason: HOSPADM

## 2021-06-16 RX ORDER — DEXAMETHASONE SODIUM PHOSPHATE 4 MG/ML
INJECTION, SOLUTION INTRA-ARTICULAR; INTRALESIONAL; INTRAMUSCULAR; INTRAVENOUS; SOFT TISSUE AS NEEDED
Status: DISCONTINUED | OUTPATIENT
Start: 2021-06-16 | End: 2021-06-16 | Stop reason: HOSPADM

## 2021-06-16 RX ORDER — HYDROMORPHONE HYDROCHLORIDE 1 MG/ML
0.5 INJECTION, SOLUTION INTRAMUSCULAR; INTRAVENOUS; SUBCUTANEOUS
Status: DISCONTINUED | OUTPATIENT
Start: 2021-06-16 | End: 2021-06-16 | Stop reason: HOSPADM

## 2021-06-16 RX ORDER — HYDROCHLOROTHIAZIDE 25 MG/1
25 TABLET ORAL DAILY
Status: DISCONTINUED | OUTPATIENT
Start: 2021-06-16 | End: 2021-06-17 | Stop reason: HOSPADM

## 2021-06-16 RX ORDER — SODIUM CHLORIDE 9 MG/ML
100 INJECTION, SOLUTION INTRAVENOUS CONTINUOUS
Status: DISPENSED | OUTPATIENT
Start: 2021-06-16 | End: 2021-06-17

## 2021-06-16 RX ORDER — INSULIN LISPRO 100 [IU]/ML
20 INJECTION, SOLUTION INTRAVENOUS; SUBCUTANEOUS
Status: DISCONTINUED | OUTPATIENT
Start: 2021-06-16 | End: 2021-06-17 | Stop reason: HOSPADM

## 2021-06-16 RX ORDER — ACETAMINOPHEN 325 MG/1
650 TABLET ORAL
Status: DISCONTINUED | OUTPATIENT
Start: 2021-06-16 | End: 2021-06-17 | Stop reason: HOSPADM

## 2021-06-16 RX ORDER — ALLOPURINOL 300 MG/1
300 TABLET ORAL DAILY
Status: DISCONTINUED | OUTPATIENT
Start: 2021-06-16 | End: 2021-06-17 | Stop reason: HOSPADM

## 2021-06-16 RX ORDER — CEFAZOLIN SODIUM/WATER 2 G/20 ML
2 SYRINGE (ML) INTRAVENOUS EVERY 12 HOURS
Status: COMPLETED | OUTPATIENT
Start: 2021-06-16 | End: 2021-06-17

## 2021-06-16 RX ORDER — DEXTROSE 50 % IN WATER (D50W) INTRAVENOUS SYRINGE
25-50 AS NEEDED
Status: DISCONTINUED | OUTPATIENT
Start: 2021-06-16 | End: 2021-06-17 | Stop reason: HOSPADM

## 2021-06-16 RX ORDER — DEXTROSE 40 %
15 GEL (GRAM) ORAL AS NEEDED
Status: DISCONTINUED | OUTPATIENT
Start: 2021-06-16 | End: 2021-06-17 | Stop reason: HOSPADM

## 2021-06-16 RX ORDER — INSULIN GLARGINE 100 [IU]/ML
65 INJECTION, SOLUTION SUBCUTANEOUS
Status: DISCONTINUED | OUTPATIENT
Start: 2021-06-16 | End: 2021-06-17 | Stop reason: HOSPADM

## 2021-06-16 RX ORDER — OXYCODONE HYDROCHLORIDE 5 MG/1
10 TABLET ORAL
Status: DISCONTINUED | OUTPATIENT
Start: 2021-06-16 | End: 2021-06-16 | Stop reason: HOSPADM

## 2021-06-16 RX ORDER — INSULIN LISPRO 100 [IU]/ML
INJECTION, SOLUTION INTRAVENOUS; SUBCUTANEOUS
Status: DISCONTINUED | OUTPATIENT
Start: 2021-06-16 | End: 2021-06-17 | Stop reason: HOSPADM

## 2021-06-16 RX ORDER — SODIUM CHLORIDE 9 MG/ML
INJECTION, SOLUTION INTRAVENOUS
Status: DISCONTINUED | OUTPATIENT
Start: 2021-06-16 | End: 2021-06-16 | Stop reason: HOSPADM

## 2021-06-16 RX ORDER — EPHEDRINE SULFATE/0.9% NACL/PF 50 MG/5 ML
SYRINGE (ML) INTRAVENOUS AS NEEDED
Status: DISCONTINUED | OUTPATIENT
Start: 2021-06-16 | End: 2021-06-16 | Stop reason: HOSPADM

## 2021-06-16 RX ORDER — LOSARTAN POTASSIUM 25 MG/1
25 TABLET ORAL DAILY
Status: DISCONTINUED | OUTPATIENT
Start: 2021-06-16 | End: 2021-06-17 | Stop reason: HOSPADM

## 2021-06-16 RX ORDER — SODIUM CHLORIDE 0.9 % (FLUSH) 0.9 %
5-40 SYRINGE (ML) INJECTION AS NEEDED
Status: DISCONTINUED | OUTPATIENT
Start: 2021-06-16 | End: 2021-06-17 | Stop reason: HOSPADM

## 2021-06-16 RX ORDER — LEVOTHYROXINE SODIUM 100 UG/1
200 TABLET ORAL
Status: DISCONTINUED | OUTPATIENT
Start: 2021-06-16 | End: 2021-06-17 | Stop reason: HOSPADM

## 2021-06-16 RX ORDER — ACETAMINOPHEN 650 MG/1
650 SUPPOSITORY RECTAL
Status: DISCONTINUED | OUTPATIENT
Start: 2021-06-16 | End: 2021-06-17 | Stop reason: HOSPADM

## 2021-06-16 RX ORDER — PROPOFOL 10 MG/ML
INJECTION, EMULSION INTRAVENOUS AS NEEDED
Status: DISCONTINUED | OUTPATIENT
Start: 2021-06-16 | End: 2021-06-16 | Stop reason: HOSPADM

## 2021-06-16 RX ORDER — SODIUM CHLORIDE 0.9 % (FLUSH) 0.9 %
5-40 SYRINGE (ML) INJECTION EVERY 8 HOURS
Status: DISCONTINUED | OUTPATIENT
Start: 2021-06-16 | End: 2021-06-17 | Stop reason: HOSPADM

## 2021-06-16 RX ORDER — ONDANSETRON 2 MG/ML
4 INJECTION INTRAMUSCULAR; INTRAVENOUS
Status: DISCONTINUED | OUTPATIENT
Start: 2021-06-16 | End: 2021-06-17 | Stop reason: HOSPADM

## 2021-06-16 RX ORDER — ROCURONIUM BROMIDE 10 MG/ML
INJECTION, SOLUTION INTRAVENOUS AS NEEDED
Status: DISCONTINUED | OUTPATIENT
Start: 2021-06-16 | End: 2021-06-16 | Stop reason: HOSPADM

## 2021-06-16 RX ORDER — PRAVASTATIN SODIUM 20 MG/1
40 TABLET ORAL DAILY
Status: DISCONTINUED | OUTPATIENT
Start: 2021-06-16 | End: 2021-06-17 | Stop reason: HOSPADM

## 2021-06-16 RX ORDER — PROMETHAZINE HYDROCHLORIDE 25 MG/1
12.5 TABLET ORAL
Status: DISCONTINUED | OUTPATIENT
Start: 2021-06-16 | End: 2021-06-17 | Stop reason: HOSPADM

## 2021-06-16 RX ORDER — LEVOTHYROXINE SODIUM 75 UG/1
75 TABLET ORAL
Status: DISCONTINUED | OUTPATIENT
Start: 2021-06-16 | End: 2021-06-17 | Stop reason: HOSPADM

## 2021-06-16 RX ORDER — SUCCINYLCHOLINE CHLORIDE 20 MG/ML
INJECTION INTRAMUSCULAR; INTRAVENOUS AS NEEDED
Status: DISCONTINUED | OUTPATIENT
Start: 2021-06-16 | End: 2021-06-16 | Stop reason: HOSPADM

## 2021-06-16 RX ORDER — LIDOCAINE HYDROCHLORIDE 20 MG/ML
INJECTION, SOLUTION EPIDURAL; INFILTRATION; INTRACAUDAL; PERINEURAL AS NEEDED
Status: DISCONTINUED | OUTPATIENT
Start: 2021-06-16 | End: 2021-06-16 | Stop reason: HOSPADM

## 2021-06-16 RX ORDER — POLYETHYLENE GLYCOL 3350 17 G/17G
17 POWDER, FOR SOLUTION ORAL DAILY
Status: DISCONTINUED | OUTPATIENT
Start: 2021-06-16 | End: 2021-06-17 | Stop reason: HOSPADM

## 2021-06-16 RX ADMIN — PHENYLEPHRINE HYDROCHLORIDE 200 MCG: 10 INJECTION INTRAVENOUS at 01:14

## 2021-06-16 RX ADMIN — LIDOCAINE HYDROCHLORIDE 100 MG: 20 INJECTION, SOLUTION EPIDURAL; INFILTRATION; INTRACAUDAL; PERINEURAL at 00:55

## 2021-06-16 RX ADMIN — HUMAN INSULIN 10 UNITS: 100 INJECTION, SOLUTION SUBCUTANEOUS at 10:00

## 2021-06-16 RX ADMIN — ROCURONIUM BROMIDE 5 MG: 10 INJECTION, SOLUTION INTRAVENOUS at 00:55

## 2021-06-16 RX ADMIN — ALLOPURINOL 300 MG: 300 TABLET ORAL at 08:53

## 2021-06-16 RX ADMIN — PHENYLEPHRINE HYDROCHLORIDE 200 MCG: 10 INJECTION INTRAVENOUS at 01:40

## 2021-06-16 RX ADMIN — PHENYLEPHRINE HYDROCHLORIDE 200 MCG: 10 INJECTION INTRAVENOUS at 01:05

## 2021-06-16 RX ADMIN — INSULIN LISPRO 10 UNITS: 100 INJECTION, SOLUTION INTRAVENOUS; SUBCUTANEOUS at 11:55

## 2021-06-16 RX ADMIN — INSULIN GLARGINE 65 UNITS: 100 INJECTION, SOLUTION SUBCUTANEOUS at 21:57

## 2021-06-16 RX ADMIN — INSULIN LISPRO 20 UNITS: 100 INJECTION, SOLUTION INTRAVENOUS; SUBCUTANEOUS at 11:55

## 2021-06-16 RX ADMIN — Medication 10 ML: at 06:00

## 2021-06-16 RX ADMIN — PHENYLEPHRINE HYDROCHLORIDE 200 MCG: 10 INJECTION INTRAVENOUS at 00:57

## 2021-06-16 RX ADMIN — CEFAZOLIN SODIUM 2 G: 100 INJECTION, POWDER, LYOPHILIZED, FOR SOLUTION INTRAVENOUS at 12:00

## 2021-06-16 RX ADMIN — SODIUM CHLORIDE 100 ML/HR: 900 INJECTION, SOLUTION INTRAVENOUS at 17:38

## 2021-06-16 RX ADMIN — PHENYLEPHRINE HYDROCHLORIDE 200 MCG: 10 INJECTION INTRAVENOUS at 01:22

## 2021-06-16 RX ADMIN — Medication 10 ML: at 14:00

## 2021-06-16 RX ADMIN — SODIUM CHLORIDE 75 ML/HR: 900 INJECTION, SOLUTION INTRAVENOUS at 08:59

## 2021-06-16 RX ADMIN — Medication 10 MG: at 01:22

## 2021-06-16 RX ADMIN — PROPOFOL 30 MG: 10 INJECTION, EMULSION INTRAVENOUS at 01:37

## 2021-06-16 RX ADMIN — INSULIN LISPRO 20 UNITS: 100 INJECTION, SOLUTION INTRAVENOUS; SUBCUTANEOUS at 17:38

## 2021-06-16 RX ADMIN — CEFAZOLIN 3 G: 1 INJECTION, POWDER, FOR SOLUTION INTRAVENOUS at 01:07

## 2021-06-16 RX ADMIN — LEVOTHYROXINE SODIUM 75 MCG: 0.07 TABLET ORAL at 06:08

## 2021-06-16 RX ADMIN — PROPOFOL 30 MG: 10 INJECTION, EMULSION INTRAVENOUS at 01:34

## 2021-06-16 RX ADMIN — DEXAMETHASONE SODIUM PHOSPHATE 4 MG: 4 INJECTION, SOLUTION INTRAMUSCULAR; INTRAVENOUS at 00:55

## 2021-06-16 RX ADMIN — Medication 10 ML: at 06:01

## 2021-06-16 RX ADMIN — SODIUM CHLORIDE: 900 INJECTION, SOLUTION INTRAVENOUS at 00:52

## 2021-06-16 RX ADMIN — HYDROCHLOROTHIAZIDE 25 MG: 25 TABLET ORAL at 08:52

## 2021-06-16 RX ADMIN — SUCCINYLCHOLINE CHLORIDE 200 MG: 20 INJECTION, SOLUTION INTRAMUSCULAR; INTRAVENOUS at 00:55

## 2021-06-16 RX ADMIN — LOSARTAN POTASSIUM 25 MG: 25 TABLET, FILM COATED ORAL at 08:52

## 2021-06-16 RX ADMIN — SODIUM CHLORIDE 75 ML/HR: 900 INJECTION, SOLUTION INTRAVENOUS at 04:00

## 2021-06-16 RX ADMIN — PHENYLEPHRINE HYDROCHLORIDE 100 MCG: 10 INJECTION INTRAVENOUS at 01:29

## 2021-06-16 RX ADMIN — INSULIN LISPRO 4 UNITS: 100 INJECTION, SOLUTION INTRAVENOUS; SUBCUTANEOUS at 17:39

## 2021-06-16 RX ADMIN — Medication 10 MG: at 01:14

## 2021-06-16 RX ADMIN — PRAVASTATIN SODIUM 40 MG: 20 TABLET ORAL at 08:52

## 2021-06-16 RX ADMIN — PHENYLEPHRINE HYDROCHLORIDE 100 MCG: 10 INJECTION INTRAVENOUS at 01:08

## 2021-06-16 RX ADMIN — PROPOFOL 240 MG: 10 INJECTION, EMULSION INTRAVENOUS at 00:55

## 2021-06-16 RX ADMIN — INSULIN LISPRO 4 UNITS: 100 INJECTION, SOLUTION INTRAVENOUS; SUBCUTANEOUS at 21:58

## 2021-06-16 RX ADMIN — Medication 10 MG: at 01:29

## 2021-06-16 RX ADMIN — INSULIN HUMAN 10 UNITS: 100 INJECTION, SOLUTION PARENTERAL at 09:12

## 2021-06-16 RX ADMIN — PHENYLEPHRINE HYDROCHLORIDE 200 MCG: 10 INJECTION INTRAVENOUS at 01:36

## 2021-06-16 RX ADMIN — PROPOFOL 30 MG: 10 INJECTION, EMULSION INTRAVENOUS at 01:40

## 2021-06-16 NOTE — PROGRESS NOTES
TRANSFER - IN REPORT:    Verbal report received from [address] RN(name) on Andrew Lopez  being received from ER(unit) for routine progression of care      Report consisted of patients Situation, Background, Assessment and   Recommendations(SBAR). Information from the following report(s) SBAR, Kardex, [facility] and Recent Results was reviewed with the receiving nurse. Opportunity for questions and clarification was provided. Assessment completed upon patients arrival to unit and care assumed.

## 2021-06-16 NOTE — PROGRESS NOTES
TRANSFER - IN REPORT:    Verbal report received from Britney Shultz RN on Titi Campos  being received from PACU for routine progression of care      Report consisted of patients Situation, Background, Assessment and   Recommendations(SBAR). Information from the following report(s) Kardex was reviewed with the receiving nurse. Opportunity for questions and clarification was provided. Assessment completed upon patients arrival to unit and care assumed.

## 2021-06-16 NOTE — PERIOP NOTES
TRANSFER - OUT REPORT:    Verbal report given to 6th Floor RN on Regi Yanez  being transferred to  for routine post - op       Report consisted of patients Situation, Background, Assessment and   Recommendations(SBAR). Information from the following report(s) SBAR, OR Summary, Procedure Summary, Intake/Output and MAR was reviewed with the receiving nurse. Lines:   Peripheral IV 06/15/21 Right Antecubital (Active)   Site Assessment Clean, dry, & intact 06/16/21 0240   Phlebitis Assessment 0 06/16/21 0240   Infiltration Assessment 0 06/16/21 0240   Dressing Status Clean, dry, & intact; Occlusive 06/16/21 0240   Dressing Type Transparent;Tape 06/16/21 0240   Hub Color/Line Status Pink;Patent 06/16/21 0240   Alcohol Cap Used No 06/16/21 0240        Opportunity for questions and clarification was provided. Patient transported with:   O2 @ 4 liters    VTE prophylaxis orders have been written for Regi Yanez. Patient and family given floor number and nurses name. Family updated re: pt status after security code verified.

## 2021-06-16 NOTE — PROGRESS NOTES
Admit Date: 6/15/2021    Subjective:     Marj Banegas is resting, hungry. VSS. Jorge in place- urine clear/yellow. Objective:     Patient Vitals for the past 8 hrs:   BP Temp Pulse Resp SpO2   06/16/21 0654 126/81 98.1 °F (36.7 °C) (!) 102 18 95 %   06/16/21 0310 (!) 153/83 98 °F (36.7 °C) 100 18 95 %   06/16/21 0251 (!) 161/82 -- (!) 106 -- 95 %   06/16/21 0245 (!) 165/84 -- (!) 110 18 95 %   06/16/21 0240 (!) 156/81 98.8 °F (37.1 °C) (!) 113 18 95 %   06/16/21 0235 (!) 159/83 -- (!) 110 18 96 %   06/16/21 0230 (!) 161/82 -- (!) 113 18 96 %   06/16/21 0225 (!) 153/77 -- (!) 113 18 98 %   06/16/21 0220 (!) 161/77 -- (!) 116 18 100 %   06/16/21 0215 (!) 167/89 -- (!) 122 16 99 %   06/16/21 0214 (!) 158/70 98.5 °F (36.9 °C) (!) 124 18 100 %   06/16/21 0210 (!) 158/70 -- (!) 124 14 100 %   06/16/21 0205 (!) 162/69 98.5 °F (36.9 °C) (!) 122 14 (!) 85 %     No intake/output data recorded. 06/14 1901 - 06/16 0700  In: 500 [I.V.:500]  Out: -     Physical Exam:  GENERAL: alert, cooperative, no distress  LUNG: clear to auscultation bilaterally  HEART: regular rate and rhythm, S1, S2  ABDOMEN: soft, non-tender  NEUROLOGIC: AOx3    Data Review   Recent Results (from the past 24 hour(s))   CBC WITH AUTOMATED DIFF    Collection Time: 06/15/21  9:27 PM   Result Value Ref Range    WBC 15.7 (H) 4.3 - 11.1 K/uL    RBC 5.85 (H) 4.23 - 5.6 M/uL    HGB 11.6 (L) 13.6 - 17.2 g/dL    HCT 37.7 (L) 41.1 - 50.3 %    MCV 64.4 (L) 79.6 - 97.8 FL    MCH 19.8 (L) 26.1 - 32.9 PG    MCHC 30.8 (L) 31.4 - 35.0 g/dL    RDW 18.4 (H) 11.9 - 14.6 %    PLATELET 209 649 - 127 K/uL    MPV Unable to calculate. Recommend adding IPF. 9.4 - 12.3 FL    ABSOLUTE NRBC 0.00 0.0 - 0.2 K/uL    DF AUTOMATED      NEUTROPHILS 87 (H) 43 - 78 %    LYMPHOCYTES 6 (L) 13 - 44 %    MONOCYTES 6 4.0 - 12.0 %    EOSINOPHILS 0 (L) 0.5 - 7.8 %    BASOPHILS 0 0.0 - 2.0 %    IMMATURE GRANULOCYTES 1 0.0 - 5.0 %    ABS. NEUTROPHILS 13.7 (H) 1.7 - 8.2 K/UL    ABS.  LYMPHOCYTES 0.9 0.5 - 4.6 K/UL    ABS. MONOCYTES 1.0 0.1 - 1.3 K/UL    ABS. EOSINOPHILS 0.0 0.0 - 0.8 K/UL    ABS. BASOPHILS 0.0 0.0 - 0.2 K/UL    ABS. IMM. GRANS. 0.1 0.0 - 0.5 K/UL   METABOLIC PANEL, COMPREHENSIVE    Collection Time: 06/15/21  9:27 PM   Result Value Ref Range    Sodium 137 136 - 145 mmol/L    Potassium 4.0 3.5 - 5.1 mmol/L    Chloride 103 98 - 107 mmol/L    CO2 25 21 - 32 mmol/L    Anion gap 9 7 - 16 mmol/L    Glucose 266 (H) 65 - 100 mg/dL    BUN 29 (H) 8 - 23 MG/DL    Creatinine 3.32 (H) 0.8 - 1.5 MG/DL    GFR est AA 24 (L) >60 ml/min/1.73m2    GFR est non-AA 20 (L) >60 ml/min/1.73m2    Calcium 8.9 8.3 - 10.4 MG/DL    Bilirubin, total 1.6 (H) 0.2 - 1.1 MG/DL    ALT (SGPT) 43 12 - 65 U/L    AST (SGOT) 22 15 - 37 U/L    Alk. phosphatase 77 50 - 136 U/L    Protein, total 8.0 6.3 - 8.2 g/dL    Albumin 3.9 3.2 - 4.6 g/dL    Globulin 4.1 (H) 2.3 - 3.5 g/dL    A-G Ratio 1.0 (L) 1.2 - 3.5     LIPASE    Collection Time: 06/15/21  9:27 PM   Result Value Ref Range    Lipase 2,140 (H) 73 - 393 U/L   CULTURE, URINE    Collection Time: 06/16/21 12:06 AM    Specimen: Clean catch; Urine    URINE   Result Value Ref Range    Special Requests: NO SPECIAL REQUESTS      Culture result:        NO GROWTH AFTER SHORT PERIOD OF INCUBATION. FURTHER RESULTS TO FOLLOW AFTER OVERNIGHT INCUBATION.    GLUCOSE, POC    Collection Time: 06/16/21  2:13 AM   Result Value Ref Range    Glucose (POC) 232 (H) 65 - 100 mg/dL    Performed by Yudelka    METABOLIC PANEL, BASIC    Collection Time: 06/16/21  5:14 AM   Result Value Ref Range    Sodium 139 136 - 145 mmol/L    Potassium 4.6 3.5 - 5.1 mmol/L    Chloride 105 98 - 107 mmol/L    CO2 28 21 - 32 mmol/L    Anion gap 6 (L) 7 - 16 mmol/L    Glucose 301 (H) 65 - 100 mg/dL    BUN 31 (H) 8 - 23 MG/DL    Creatinine 3.36 (H) 0.8 - 1.5 MG/DL    GFR est AA 24 (L) >60 ml/min/1.73m2    GFR est non-AA 20 (L) >60 ml/min/1.73m2    Calcium 8.5 8.3 - 10.4 MG/DL   CBC WITH AUTOMATED DIFF    Collection Time: 06/16/21  5:14 AM   Result Value Ref Range    WBC 14.8 (H) 4.3 - 11.1 K/uL    RBC 5.27 4.23 - 5.6 M/uL    HGB 10.4 (L) 13.6 - 17.2 g/dL    HCT 34.7 (L) 41.1 - 50.3 %    MCV 65.8 (L) 79.6 - 97.8 FL    MCH 19.7 (L) 26.1 - 32.9 PG    MCHC 30.0 (L) 31.4 - 35.0 g/dL    RDW 18.1 (H) 11.9 - 14.6 %    PLATELET 285 196 - 387 K/uL    MPV Unable to calculate. Recommend adding IPF. 9.4 - 12.3 FL    ABSOLUTE NRBC 0.00 0.0 - 0.2 K/uL    DF AUTOMATED      NEUTROPHILS 93 (H) 43 - 78 %    LYMPHOCYTES 3 (L) 13 - 44 %    MONOCYTES 3 (L) 4.0 - 12.0 %    EOSINOPHILS 0 (L) 0.5 - 7.8 %    BASOPHILS 0 0.0 - 2.0 %    IMMATURE GRANULOCYTES 1 0.0 - 5.0 %    ABS. NEUTROPHILS 13.7 (H) 1.7 - 8.2 K/UL    ABS. LYMPHOCYTES 0.4 (L) 0.5 - 4.6 K/UL    ABS. MONOCYTES 0.5 0.1 - 1.3 K/UL    ABS. EOSINOPHILS 0.0 0.0 - 0.8 K/UL    ABS. BASOPHILS 0.0 0.0 - 0.2 K/UL    ABS. IMM. GRANS. 0.1 0.0 - 0.5 K/UL   GLUCOSE, POC    Collection Time: 06/16/21  9:04 AM   Result Value Ref Range    Glucose (POC) 423 (H) 65 - 100 mg/dL    Performed by Kavitha Son        Assessment:     Principal Problem:    Ureteral obstruction (6/15/2021)    Active Problems:    Essential hypertension (3/8/2017)      Class 2 severe obesity due to excess calories with serious comorbidity and body mass index (BMI) of 38.0 to 38.9 in adult [facility]) (8/29/2019)      Pancreatic mass (5/15/2020)      Uncontrolled type 2 diabetes mellitus with microalbuminuria, with long-term current use of insulin ([facility] [address] (11/18/2020)      Stage 3a chronic kidney disease ([facility] [address] (11/18/2020)      Acute renal failure superimposed on stage 3a chronic kidney disease ([address] (6/15/2021)      77 yo male with hx of left kidney cancer s/p radical nephrectomy with solitary right kidney presents with no UOP x 24 hours and right flank pain.   He was found to have obstructing 3 mm right distal ureteral stone with hydroureteronephrosis, s/p cystoscopy, R URS, laser lithotripsy, basket stone extraction, right ureteral stent placement 6/16.  UA negative for infection. Aleman catheter in place. Cn today is 3.36 from 3.32. Plan:     Cn slightly increased. Would continue aleman catheter and  monitor daily Cn, remove aleman once Cn stable. Okay for regular diet. Will follow with you. Ernestine Swan NP  [facility]    Urology Attending Physician Note:     Admit Date: 6/15/2021    Subjective:     No complaints today. Feels much better. R flank pain resolved with stent. Catheter draining. Objective:     Patient Vitals for the past 8 hrs:   BP Temp Pulse Resp SpO2   06/16/21 0654 126/81 98.1 °F (36.7 °C) (!) 102 18 95 %   06/16/21 0310 (!) 153/83 98 °F (36.7 °C) 100 18 95 %   06/16/21 0251 (!) 161/82 -- (!) 106 -- 95 %   06/16/21 0245 (!) 165/84 -- (!) 110 18 95 %   06/16/21 0240 (!) 156/81 98.8 °F (37.1 °C) (!) 113 18 95 %   06/16/21 0235 (!) 159/83 -- (!) 110 18 96 %   06/16/21 0230 (!) 161/82 -- (!) 113 18 96 %     No intake/output data recorded. 06/14 1901 - 06/16 0700  In: 500 [I.V.:500]  Out: -     Physical Exam:   Visit Vitals  /81 (BP 1 Location: Left arm, BP Patient Position: At rest)   Pulse (!) 102   Temp 98.1 °F (36.7 °C)   Resp 18   Ht 5' 10\" (1.778 m)   Wt 270 lb (122.5 kg)   SpO2 95%   BMI 38.74 kg/m²        GENERAL: No acute distress, Awake, Alert, Oriented X 3  CARDIAC: regular rate and rhythm  CHEST AND LUNG: Easy work of breathing,  ABDOMEN: soft, non tender, non-distended,  : Aleman draining clear yellow urine.    SKIN: No rash, no erythema, no lacerations or abrasions, no ecchymosis  NEUROLOGIC: cranial nerves 2-12 grossly intact           Data Review   Recent Results (from the past 24 hour(s))   CBC WITH AUTOMATED DIFF    Collection Time: 06/15/21  9:27 PM   Result Value Ref Range    WBC 15.7 (H) 4.3 - 11.1 K/uL    RBC 5.85 (H) 4.23 - 5.6 M/uL    HGB 11.6 (L) 13.6 - 17.2 g/dL    HCT 37.7 (L) 41.1 - 50.3 %    MCV 64.4 (L) 79.6 - 97.8 FL    MCH 19.8 (L) 26.1 - 32.9 PG    MCHC 30.8 (L) 31.4 - 35.0 g/dL    RDW 18.4 (H) 11.9 - 14.6 %    PLATELET 420 250 - 016 K/uL    MPV Unable to calculate. Recommend adding IPF. 9.4 - 12.3 FL    ABSOLUTE NRBC 0.00 0.0 - 0.2 K/uL    DF AUTOMATED      NEUTROPHILS 87 (H) 43 - 78 %    LYMPHOCYTES 6 (L) 13 - 44 %    MONOCYTES 6 4.0 - 12.0 %    EOSINOPHILS 0 (L) 0.5 - 7.8 %    BASOPHILS 0 0.0 - 2.0 %    IMMATURE GRANULOCYTES 1 0.0 - 5.0 %    ABS. NEUTROPHILS 13.7 (H) 1.7 - 8.2 K/UL    ABS. LYMPHOCYTES 0.9 0.5 - 4.6 K/UL    ABS. MONOCYTES 1.0 0.1 - 1.3 K/UL    ABS. EOSINOPHILS 0.0 0.0 - 0.8 K/UL    ABS. BASOPHILS 0.0 0.0 - 0.2 K/UL    ABS. IMM. GRANS. 0.1 0.0 - 0.5 K/UL   METABOLIC PANEL, COMPREHENSIVE    Collection Time: 06/15/21  9:27 PM   Result Value Ref Range    Sodium 137 136 - 145 mmol/L    Potassium 4.0 3.5 - 5.1 mmol/L    Chloride 103 98 - 107 mmol/L    CO2 25 21 - 32 mmol/L    Anion gap 9 7 - 16 mmol/L    Glucose 266 (H) 65 - 100 mg/dL    BUN 29 (H) 8 - 23 MG/DL    Creatinine 3.32 (H) 0.8 - 1.5 MG/DL    GFR est AA 24 (L) >60 ml/min/1.73m2    GFR est non-AA 20 (L) >60 ml/min/1.73m2    Calcium 8.9 8.3 - 10.4 MG/DL    Bilirubin, total 1.6 (H) 0.2 - 1.1 MG/DL    ALT (SGPT) 43 12 - 65 U/L    AST (SGOT) 22 15 - 37 U/L    Alk. phosphatase 77 50 - 136 U/L    Protein, total 8.0 6.3 - 8.2 g/dL    Albumin 3.9 3.2 - 4.6 g/dL    Globulin 4.1 (H) 2.3 - 3.5 g/dL    A-G Ratio 1.0 (L) 1.2 - 3.5     LIPASE    Collection Time: 06/15/21  9:27 PM   Result Value Ref Range    Lipase 2,140 (H) 73 - 393 U/L   CULTURE, URINE    Collection Time: 06/16/21 12:06 AM    Specimen: Clean catch; Urine    URINE   Result Value Ref Range    Special Requests: NO SPECIAL REQUESTS      Culture result:        NO GROWTH AFTER SHORT PERIOD OF INCUBATION. FURTHER RESULTS TO FOLLOW AFTER OVERNIGHT INCUBATION.    GLUCOSE, POC    Collection Time: 06/16/21  2:13 AM   Result Value Ref Range    Glucose (POC) 232 (H) 65 - 100 mg/dL    Performed by [address] [place] [address], [facility]    Collection Time: 06/16/21  5:14 AM   Result Value Ref Range    Sodium 139 136 - 145 mmol/L    Potassium 4.6 3.5 - 5.1 mmol/L    Chloride 105 98 - 107 mmol/L    CO2 28 21 - 32 mmol/L    Anion gap 6 (L) 7 - 16 mmol/L    Glucose 301 (H) 65 - 100 mg/dL    BUN 31 (H) 8 - 23 MG/DL    Creatinine 3.36 (H) 0.8 - 1.5 MG/DL    GFR est AA 24 (L) >60 ml/min/1.73m2    GFR est non-AA 20 (L) >60 ml/min/1.73m2    Calcium 8.5 8.3 - 10.4 MG/DL   CBC WITH AUTOMATED DIFF    Collection Time: 06/16/21  5:14 AM   Result Value Ref Range    WBC 14.8 (H) 4.3 - 11.1 K/uL    RBC 5.27 4.23 - 5.6 M/uL    HGB 10.4 (L) 13.6 - 17.2 g/dL    HCT 34.7 (L) 41.1 - 50.3 %    MCV 65.8 (L) 79.6 - 97.8 FL    MCH 19.7 (L) 26.1 - 32.9 PG    MCHC 30.0 (L) 31.4 - 35.0 g/dL    RDW 18.1 (H) 11.9 - 14.6 %    PLATELET 675 403 - 705 K/uL    MPV Unable to calculate. Recommend adding IPF. 9.4 - 12.3 FL    ABSOLUTE NRBC 0.00 0.0 - 0.2 K/uL    DF AUTOMATED      NEUTROPHILS 93 (H) 43 - 78 %    LYMPHOCYTES 3 (L) 13 - 44 %    MONOCYTES 3 (L) 4.0 - 12.0 %    EOSINOPHILS 0 (L) 0.5 - 7.8 %    BASOPHILS 0 0.0 - 2.0 %    IMMATURE GRANULOCYTES 1 0.0 - 5.0 %    ABS. NEUTROPHILS 13.7 (H) 1.7 - 8.2 K/UL    ABS. LYMPHOCYTES 0.4 (L) 0.5 - 4.6 K/UL    ABS. MONOCYTES 0.5 0.1 - 1.3 K/UL    ABS. EOSINOPHILS 0.0 0.0 - 0.8 K/UL    ABS. BASOPHILS 0.0 0.0 - 0.2 K/UL    ABS. IMM.  GRANS. 0.1 0.0 - 0.5 K/UL   GLUCOSE, POC    Collection Time: 06/16/21  9:04 AM   Result Value Ref Range    Glucose (POC) 423 (H) 65 - 100 mg/dL    Performed by Dominic Rosas            Assessment:     Principal Problem:    Acute renal failure superimposed on stage 3a chronic kidney disease ([facility] [address] (6/15/2021)    Active Problems:    Postsurgical hypothyroidism (3/8/2017)      Essential hypertension (3/8/2017)      Class 2 severe obesity due to excess calories with serious comorbidity and body mass index (BMI) of 38.0 to 38.9 in adult [facility]) (8/29/2019)      Pancreatic mass (5/15/2020)      Uncontrolled type 2 diabetes mellitus with microalbuminuria, with long-term current use of insulin ([facility] [address] (11/18/2020)      Stage 3a chronic kidney disease ([facility] [address] (11/18/2020)      Ureteral obstruction (6/15/2021)        Plan:     -Trend creatinine  -Keep aleman for now until down-trends  -OK for diet from urology standpoint  -Monitor urine output for post-obstructive diuresis  -Will follow with you. In addition to my documentation above, I have also reviewed the nurse practitioner note and personally examined the patient. I agree with the HPI, exam, assessment and plan. Iraj Huggins M.D.     [facility]  [address]  Phone: [phone]  Fax: [phone]

## 2021-06-16 NOTE — PROGRESS NOTES
06/16/21 0310   Dual Skin Pressure Injury Assessment   Dual Skin Pressure Injury Assessment WDL   Second Care Provider (Based on [address] [place] [address]) Georgina,RN   Hourly rounds complete this shift, no new complaints at this time  bed in low, locked position, call light and bedside table within reach,  all needs met. Will continue to monitor Report to day shift nurse.

## 2021-06-16 NOTE — PROGRESS NOTES
CM viewed patient's chart. Per chart review, patient insured with PCP, with wife listed as emergency contact. No needs noted at this time however, CM will continue to follow to assist with any needs that may arise. DCP is for patient to return home. No PT/OT consults at this time. Care Management Interventions  PCP Verified by CM:  Yes (Dr. Sharon Leigh)  Transition of Care Consult (CM Consult): Discharge Planning  Physical Therapy Consult: No  Occupational Therapy Consult: No  Speech Therapy Consult: No  Current Support Network: Lives with Spouse  Discharge Location  Discharge Placement: Home
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
## 2021-06-16 NOTE — PROGRESS NOTES
Urology Update Note:     Patient underwent Cystoscopy, RIGHT Ureteroscopy, Laser Lithotripsy, RIGHT Ureteral Stent Placement. Tolerated well. OK for diet from urology standpoint. Trend creatinine. Jorge left for max drainage of urinary tract. Continue ancef 2 gram q8h for UTI prophylaxis. Will follow    Iraj Mo M.D.     [facility]  [place] [address] [address] [facility]  Phone: [phone]  Fax: [phone]

## 2021-06-16 NOTE — PROGRESS NOTES
Hourly rounds performed through shift, pt denies needs at this time. Jorge flowing without difficulty. Pt has denied pain during shift. Bed in low position, locked and call light/personal items within reach. Will continue to monitor and report to night shift nurse.

## 2021-06-16 NOTE — ANESTHESIA POSTPROCEDURE EVALUATION
Procedure(s):  CYSTOSCOPY URETERAL STENT INSERTION RIGHT URETEROSCOPY, LASER LITH, BASKET STONE EXTRACTION.     general    Anesthesia Post Evaluation      Multimodal analgesia: multimodal analgesia used between 6 hours prior to anesthesia start to PACU discharge  Patient location during evaluation: PACU  Patient participation: complete - patient participated  Level of consciousness: awake  Pain management: adequate  Airway patency: patent  Anesthetic complications: no  Cardiovascular status: acceptable and tachycardic  Respiratory status: acceptable  Hydration status: acceptable  Post anesthesia nausea and vomiting:  none      INITIAL Post-op Vital signs:   Vitals Value Taken Time   /82 06/16/21 0251   Temp 37.1 °C (98.8 °F) 06/16/21 0240   Pulse 106 06/16/21 0251   Resp 18 06/16/21 0245   SpO2 95 % 06/16/21 0251

## 2021-06-16 NOTE — PROGRESS NOTES
Hospitalist Progress Note     Admit Date:  6/15/2021  9:28 PM   Name:  Marj Banegas   Age:  76 y.o.  :  1955   MRN:  [id number]   PCP:  Fly Aguilar DO  Presenting Complaint: Blood in Urine    Initial Admission Diagnosis: Ureteral obstruction [N13.5]  MARTIN (acute kidney injury) ([facility] [N17.9]     Assessment and Plan:     Hospital Problems as of 2021 Date Reviewed: 2021        Codes Class Noted - Resolved POA    * (Principal) Ureteral obstruction ICD-10-CM: N13.5  ICD-9-CM: 593.4  6/15/2021 - Present Yes        Acute renal failure superimposed on stage 3a chronic kidney disease ([facility] ICD-10-CM: N17.9, N18.31  ICD-9-CM: 584.9, 585.3  6/15/2021 - Present Yes        Uncontrolled type 2 diabetes mellitus with microalbuminuria, with long-term current use of insulin (HCC) (Chronic) ICD-10-CM: E11.29, E11.65, R80.9, Z79.4  ICD-9-CM: 250.42, 791.0, V58.67  2020 - Present Yes        Stage 3a chronic kidney disease ([facility] (Chronic) ICD-10-CM: N18.31  ICD-9-CM: 585.3  2020 - Present Yes        Pancreatic mass (Chronic) ICD-10-CM: K86.89  ICD-9-CM: 577.8  5/15/2020 - Present Yes        Class 2 severe obesity due to excess calories with serious comorbidity and body mass index (BMI) of 38.0 to 38.9 in adult [facility]) (Chronic) ICD-10-CM: E66.01, Z68.38  ICD-9-CM: 278.01, V85.38  2019 - Present Yes        Essential hypertension (Chronic) ICD-10-CM: I10  ICD-9-CM: 401.9  3/8/2017 - Present Yes              Plan:  MARTIN  -cont aleman until Cr improving. Expect improvement tomorrow  -cont IVF for now    DM  -uncontrolled  -add prandial insulin 20u  -cont home lantus 65u  -cont ISS    Hypothyroid  -TSH has been undetectable in past.  Endocrinologist has been decreasing synthroid dose accordingly per note in March    -check TSH again  -noted  \"history of thyroid cancer status post total thyroidectomy in  in [place], status post AMOR-131 (dose unknown).   He then had a recurrence in right lateral cervical lymph nodes status post right lateral neck dissection in 2010 \"    Ureteral obstruction  -S/p cystoscopy, right ureteroscopy, laser lithotripsy, basket stone extraction, right ureteral stent placement    HTN  -improved. Cont current meds    Discharge planning:    -home when ready    Other listed chronic conditions stable but add to medical complexity; continue current management. Diet:  DIET ADULT  DVT ppx:  SCDs    Hospital Course:   Patient is a 76 y.o. male with who presented to the ED with reports of gross hematuria. He does have a known kidney stone but believes that he passed it.  some right flank pain and has had some nausea and vomiting, mild fever and some chills. Of note, he only has a single kidney. CT scan showed a mildly obstructing distal right ureteral stone. His creatinine had also increased significantly. ER discussed findings with urology and he had emergent cystoscopy, right ureteroscopy, laser lithotripsy, basket stone extraction, right ureteral stent placement 6/16.    24hr Events/Subjective:   Pt feeling better. Tolerating PO. No CP, SOB, fevers. Still with gross hematuria.     No other complaints  Objective:     Patient Vitals for the past 24 hrs:   Temp Pulse Resp BP SpO2   06/16/21 0654 98.1 °F (36.7 °C) (!) 102 18 126/81 95 %   06/16/21 0310 98 °F (36.7 °C) 100 18 (!) 153/83 95 %   06/16/21 0251  (!) 106  (!) 161/82 95 %   06/16/21 0245  (!) 110 18 (!) 165/84 95 %   06/16/21 0240 98.8 °F (37.1 °C) (!) 113 18 (!) 156/81 95 %   06/16/21 0235  (!) 110 18 (!) 159/83 96 %   06/16/21 0230  (!) 113 18 (!) 161/82 96 %   06/16/21 0225  (!) 113 18 (!) 153/77 98 %   06/16/21 0220  (!) 116 18 (!) 161/77 100 %   06/16/21 0215  (!) 122 16 (!) 167/89 99 %   06/16/21 0214 98.5 °F (36.9 °C) (!) 124 18 (!) 158/70 100 %   06/16/21 0210  (!) 124 14 (!) 158/70 100 %   06/16/21 0205 98.5 °F (36.9 °C) (!) 122 14 (!) 162/69 (!) 85 %   06/15/21 2244  78  (!) 177/86 96 %   06/15/21 2237     95 %   06/15/21 2229  78  (!) 183/84 98 %   06/15/21 2214  80  (!) 185/96 98 %   06/15/21 2124 98.3 °F (36.8 °C) 79 20 (!) 190/86 98 %     Oxygen Therapy  O2 Sat (%): 95 % (06/16/21 0654)  Pulse via Oximetry: 107 beats per minute (06/16/21 0251)  O2 Device: Nasal cannula (06/16/21 0240)  O2 Flow Rate (L/min): 4 l/min (06/16/21 0240)    Estimated body mass index is 38.74 kg/m² as calculated from the following:    Height as of this encounter: 5' 10\" (1.778 m). Weight as of this encounter: 122.5 kg (270 lb). Intake/Output Summary (Last 24 hours) at 6/16/2021 1004  Last data filed at 6/16/2021 0215  Gross per 24 hour   Intake 500 ml   Output    Net 500 ml       *Note that automatically entered I/Os may not be accurate; dependent on patient compliance with collection and accurate  by [employer]. General:    Well nourished. No overt distress  CV:   RRR. No edema. Lungs:   Even, Unlabored  Abdomen:    nondistended. Extremities: Warm and dry. No cyanosis   Skin:     No rashes. Normal coloration  Neuro:  No gross focal deficits. I have reviewed all labs, meds, and other studies shown below:  Last 24hr Labs:  Recent Results (from the past 24 hour(s))   CBC WITH AUTOMATED DIFF    Collection Time: 06/15/21  9:27 PM   Result Value Ref Range    WBC 15.7 (H) 4.3 - 11.1 K/uL    RBC 5.85 (H) 4.23 - 5.6 M/uL    HGB 11.6 (L) 13.6 - 17.2 g/dL    HCT 37.7 (L) 41.1 - 50.3 %    MCV 64.4 (L) 79.6 - 97.8 FL    MCH 19.8 (L) 26.1 - 32.9 PG    MCHC 30.8 (L) 31.4 - 35.0 g/dL    RDW 18.4 (H) 11.9 - 14.6 %    PLATELET 092 654 - 964 K/uL    MPV Unable to calculate. Recommend adding IPF. 9.4 - 12.3 FL    ABSOLUTE NRBC 0.00 0.0 - 0.2 K/uL    DF AUTOMATED      NEUTROPHILS 87 (H) 43 - 78 %    LYMPHOCYTES 6 (L) 13 - 44 %    MONOCYTES 6 4.0 - 12.0 %    EOSINOPHILS 0 (L) 0.5 - 7.8 %    BASOPHILS 0 0.0 - 2.0 %    IMMATURE GRANULOCYTES 1 0.0 - 5.0 %    ABS. NEUTROPHILS 13.7 (H) 1.7 - 8.2 K/UL    ABS. LYMPHOCYTES 0.9 0.5 - 4.6 K/UL    ABS. MONOCYTES 1.0 0.1 - 1.3 K/UL    ABS. EOSINOPHILS 0.0 0.0 - 0.8 K/UL    ABS. BASOPHILS 0.0 0.0 - 0.2 K/UL    ABS. IMM. GRANS. 0.1 0.0 - 0.5 K/UL   METABOLIC PANEL, COMPREHENSIVE    Collection Time: 06/15/21  9:27 PM   Result Value Ref Range    Sodium 137 136 - 145 mmol/L    Potassium 4.0 3.5 - 5.1 mmol/L    Chloride 103 98 - 107 mmol/L    CO2 25 21 - 32 mmol/L    Anion gap 9 7 - 16 mmol/L    Glucose 266 (H) 65 - 100 mg/dL    BUN 29 (H) 8 - 23 MG/DL    Creatinine 3.32 (H) 0.8 - 1.5 MG/DL    GFR est AA 24 (L) >60 ml/min/1.73m2    GFR est non-AA 20 (L) >60 ml/min/1.73m2    Calcium 8.9 8.3 - 10.4 MG/DL    Bilirubin, total 1.6 (H) 0.2 - 1.1 MG/DL    ALT (SGPT) 43 12 - 65 U/L    AST (SGOT) 22 15 - 37 U/L    Alk. phosphatase 77 50 - 136 U/L    Protein, total 8.0 6.3 - 8.2 g/dL    Albumin 3.9 3.2 - 4.6 g/dL    Globulin 4.1 (H) 2.3 - 3.5 g/dL    A-G Ratio 1.0 (L) 1.2 - 3.5     LIPASE    Collection Time: 06/15/21  9:27 PM   Result Value Ref Range    Lipase 2,140 (H) 73 - 393 U/L   CULTURE, URINE    Collection Time: 06/16/21 12:06 AM    Specimen: Clean catch; Urine    URINE   Result Value Ref Range    Special Requests: NO SPECIAL REQUESTS      Culture result:        NO GROWTH AFTER SHORT PERIOD OF INCUBATION. FURTHER RESULTS TO FOLLOW AFTER OVERNIGHT INCUBATION.    GLUCOSE, POC    Collection Time: 06/16/21  2:13 AM   Result Value Ref Range    Glucose (POC) 232 (H) 65 - 100 mg/dL    Performed by Yudelka    METABOLIC PANEL, BASIC    Collection Time: 06/16/21  5:14 AM   Result Value Ref Range    Sodium 139 136 - 145 mmol/L    Potassium 4.6 3.5 - 5.1 mmol/L    Chloride 105 98 - 107 mmol/L    CO2 28 21 - 32 mmol/L    Anion gap 6 (L) 7 - 16 mmol/L    Glucose 301 (H) 65 - 100 mg/dL    BUN 31 (H) 8 - 23 MG/DL    Creatinine 3.36 (H) 0.8 - 1.5 MG/DL    GFR est AA 24 (L) >60 ml/min/1.73m2    GFR est non-AA 20 (L) >60 ml/min/1.73m2    Calcium 8.5 8.3 - 10.4 MG/DL   CBC WITH AUTOMATED DIFF Collection Time: 06/16/21  5:14 AM   Result Value Ref Range    WBC 14.8 (H) 4.3 - 11.1 K/uL    RBC 5.27 4.23 - 5.6 M/uL    HGB 10.4 (L) 13.6 - 17.2 g/dL    HCT 34.7 (L) 41.1 - 50.3 %    MCV 65.8 (L) 79.6 - 97.8 FL    MCH 19.7 (L) 26.1 - 32.9 PG    MCHC 30.0 (L) 31.4 - 35.0 g/dL    RDW 18.1 (H) 11.9 - 14.6 %    PLATELET 911 326 - 978 K/uL    MPV Unable to calculate. Recommend adding IPF. 9.4 - 12.3 FL    ABSOLUTE NRBC 0.00 0.0 - 0.2 K/uL    DF AUTOMATED      NEUTROPHILS 93 (H) 43 - 78 %    LYMPHOCYTES 3 (L) 13 - 44 %    MONOCYTES 3 (L) 4.0 - 12.0 %    EOSINOPHILS 0 (L) 0.5 - 7.8 %    BASOPHILS 0 0.0 - 2.0 %    IMMATURE GRANULOCYTES 1 0.0 - 5.0 %    ABS. NEUTROPHILS 13.7 (H) 1.7 - 8.2 K/UL    ABS. LYMPHOCYTES 0.4 (L) 0.5 - 4.6 K/UL    ABS. MONOCYTES 0.5 0.1 - 1.3 K/UL    ABS. EOSINOPHILS 0.0 0.0 - 0.8 K/UL    ABS. BASOPHILS 0.0 0.0 - 0.2 K/UL    ABS. IMM. GRANS. 0.1 0.0 - 0.5 K/UL   GLUCOSE, POC    Collection Time: 06/16/21  9:04 AM   Result Value Ref Range    Glucose (POC) 423 (H) 65 - 100 mg/dL    Performed by Noemi Marcos        All Micro Results     Procedure Component Value Units Date/Time    CULTURE, URINE [[id number]] Collected: 06/16/21 0006    Order Status: Completed Specimen: Urine from Clean catch Updated: 06/16/21 0823     Special Requests: NO SPECIAL REQUESTS        Culture result:       NO GROWTH AFTER SHORT PERIOD OF INCUBATION. FURTHER RESULTS TO FOLLOW AFTER OVERNIGHT INCUBATION.                 SARS-CoV-2 Lab Results  \"Novel Coronavirus\" Test: No results found for: COV2NT   \"Emergent Disease\" Test: No results found for: EDPR  \"SARS-COV-2\" Test: No results found for: XGCOVT  Rapid Test: No results found for: COVR         Current Meds:  Current Facility-Administered Medications   Medication Dose Route Frequency    allopurinoL (ZYLOPRIM) tablet 300 mg  300 mg Oral DAILY    hydroCHLOROthiazide (HYDRODIURIL) tablet 25 mg  25 mg Oral DAILY    insulin glargine (LANTUS) injection 65 Units  65 Units SubCUTAneous QHS    levothyroxine (SYNTHROID) tablet 200 mcg  200 mcg Oral ACB    levothyroxine (SYNTHROID) tablet 75 mcg  75 mcg Oral ACB    losartan (COZAAR) tablet 25 mg  25 mg Oral DAILY    pravastatin (PRAVACHOL) tablet 40 mg  40 mg Oral DAILY    dextrose 40% (GLUTOSE) oral gel 1 Tube  15 g Oral PRN    glucagon (GLUCAGEN) injection 1 mg  1 mg IntraMUSCular PRN    dextrose (D50W) injection syrg 12.5-25 g  25-50 mL IntraVENous PRN    0.9% sodium chloride infusion  100 mL/hr IntraVENous CONTINUOUS    sodium chloride (NS) flush 5-40 mL  5-40 mL IntraVENous Q8H    sodium chloride (NS) flush 5-40 mL  5-40 mL IntraVENous PRN    acetaminophen (TYLENOL) tablet 650 mg  650 mg Oral Q6H PRN    Or    acetaminophen (TYLENOL) suppository 650 mg  650 mg Rectal Q6H PRN    polyethylene glycol (MIRALAX) packet 17 g  17 g Oral DAILY    promethazine (PHENERGAN) tablet 12.5 mg  12.5 mg Oral Q6H PRN    Or    ondansetron (ZOFRAN) injection 4 mg  4 mg IntraVENous Q6H PRN    ceFAZolin (ANCEF) 2 g/20 mL in sterile water IV syringe  2 g IntraVENous Q12H    insulin lispro (HUMALOG) injection   SubCUTAneous AC&HS    insulin lispro (HUMALOG) injection 20 Units  20 Units SubCUTAneous TIDAC    sodium chloride (NS) flush 5-10 mL  5-10 mL IntraVENous Q8H    sodium chloride (NS) flush 5-10 mL  5-10 mL IntraVENous PRN       Other Studies:  No results found for this visit on 06/15/21. CT ABD/PEL FOR RENAL STONE    Result Date: 6/15/2021  EXAM: Noncontrast CT abdomen and pelvis. INDICATION: Abdominal pain. COMPARISON: Prior CT abdomen and pelvis on May 4, 2021. TECHNIQUE: Axial CT images of the abdomen and pelvis were obtained without IV contrast. Radiation dose reduction techniques were used for this study. Our CT scanners use one or all of the following:  Automated exposure control, adjustment of the mA or kV according to patient size, iterative reconstruction. FINDINGS: - Liver: Within normal limits.  - Gallbladder and bile ducts: Within normal limits. - Spleen: Within normal limits. - Urinary tract: There has been a left nephrectomy. There is a 3 mm stone in the distal right ureter, causing mild right-sided hydronephrosis. There are also a few right kidney stones measuring up to 8 mm in diameter. The urinary bladder is collapsed. - Adrenals: Within normal limits. - Pancreas: No significant change in the 3.4 and 4.2 cm masses or complex cysts in the body of the pancreas. - Gastrointestinal tract: Within normal limits. - Retroperitoneum: Mild abdominal aortic atherosclerosis, with no aneurysmal dilatation. - Peritoneal cavity and abdominal wall: No ascites or free intraperitoneal air. - Pelvis: There is a fat-containing left inguinal hernia. - Spine/bones: No acute process. - Other comments: The lung bases are clear. 1. Mildly obstructing 3 mm distal right ureter stone. 2. Right kidney stones. 3. Prior left nephrectomy. 4. Unchanged complex cysts or masses in the pancreas.        Signed:  Babar Ahuja MD

## 2021-06-16 NOTE — ED TRIAGE NOTES
Arrives with face mask on chin. Reports right flank pain, blood in urine, n/v. Onset earlier today. Pt with solitary right kidney. Known stone to right.  Instructed to come to ED by urology

## 2021-06-16 NOTE — ANESTHESIA PREPROCEDURE EVALUATION
Relevant Problems   No relevant active problems       Anesthetic History   No history of anesthetic complications            Review of Systems / Medical History  Patient summary reviewed and pertinent labs reviewed    Pulmonary        Sleep apnea (s/p uppp):  No treatment           Neuro/Psych              Cardiovascular    Hypertension              Exercise tolerance: >4 METS     GI/Hepatic/Renal     GERD    Renal disease (MARTIN): CRI and stones       Endo/Other    Diabetes: poorly controlled, type 2, using insulin  Hypothyroidism  Obesity     Other Findings              Physical Exam    Airway  Mallampati: III  TM Distance: < 4 cm  Neck ROM: normal range of motion   Mouth opening: Diminished (comment)     Cardiovascular    Rhythm: regular  Rate: normal         Dental  No notable dental hx       Pulmonary  Breath sounds clear to auscultation               Abdominal         Other Findings            Anesthetic Plan    ASA: 3, emergent  Anesthesia type: general          Induction: Intravenous  Anesthetic plan and risks discussed with: Patient

## 2021-06-16 NOTE — ED NOTES
TRANSFER - OUT REPORT:    Verbal report given to Mckinley Real RN(name) on [facility] Alan  being transferred to [facility](unit) for routine progression of care       Report consisted of patients Situation, Background, Assessment and   Recommendations(SBAR). Information from the following report(s) SBAR, ED Summary, Procedure Summary, MAR and Recent Results was reviewed with the receiving nurse. Lines:   Peripheral IV 06/15/21 Right Antecubital (Active)   Site Assessment Clean, dry, & intact 06/15/21 2125   Phlebitis Assessment 0 06/15/21 2125   Infiltration Assessment 0 06/15/21 2125   Dressing Status Clean, dry, & intact 06/15/21 2125   Dressing Type Transparent 06/15/21 2125   Hub Color/Line Status Red 06/15/21 2125        Opportunity for questions and clarification was provided.       Patient transported with:  Pt will go to OR first, then room [facility]
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
## 2021-06-16 NOTE — OP NOTES
[address] [facility]  OPERATIVE REPORT    Name:  Juliana Arriaga  MR#:  [id number]  :  1955  ACCOUNT #:  [de-identified]  DATE OF SERVICE:  2021    PREOPERATIVE DIAGNOSIS:  Right ureteral stone. POSTOPERATIVE DIAGNOSIS:  Right ureteral stone. PROCEDURE PERFORMED:  Cystoscopy, right ureteroscopy, laser lithotripsy, basket stone extraction, right ureteral stent placement. SURGEON:  Virgilio Sheets MD    ASSISTANT:  None. ANESTHESIA:  General.    COMPLICATIONS:  None. SPECIMENS REMOVED:  Right ureteral stone for analysis. IMPLANTS:  6 x 26 double-J right ureteral stent without strings. ESTIMATED BLOOD LOSS:  Minimal.    DRAINS:  Jorge catheter 16-Russian. FINDINGS:  A 3-mm obstructing right distal ureteral stone causing hydroureteronephrosis behind the level of the stone and complete visual obstruction of the solitary right kidney. INDICATIONS OF OPERATIVE PROCEDURE:  The patient is a 79-year-old gentleman with a history of kidney stone and kidney cancer status post left radical nephrectomy with a solitary right kidney. He presented today with no urine output for 24 hours, right flank pain, and concern for passing stone in the distal ureter on CT scan. He was counseled on options and opted to proceed with emergent intervention for his stone given his solitary kidney and the area. DESCRIPTION OF OPERATIVE PROCEDURE:  After informed consent was obtained, the correct patient was identified in the preoperative holding area. He was taken back to operating suite and placed on table in the supine position. Time-out was performed confirming the correct patient and planned procedure. He received 2 g of IV Ancef prior to smooth induction of general endotracheal anesthesia. He was moved into dorsal lithotomy position, prepped and draped in usual sterile fashion.   I began the case by inserting a 22-Russian rigid cystoscope with a 30-degree lens into the urethra and advanced it into the patient's bladder. Pancystoscopy was performed which revealed ureteral orifices in orthotopic positions bilaterally. He had moderate lateral lobe BPH amenable to UroLift or TURP. The right ureteral orifice was visualized and I intended to cannulate with a sensor wire. Unfortunately, I had difficulty given the angle. I then attempted to pass the ureteral catheter into the orifice to guide the sensor wire and I again could not pass it. At this point, I decided to remove the rigid cystoscope and insert the semi-rigid ureteroscope, and under direct vision with some manipulation, I was able to advance the sensor wire up to the level of the right kidney. I backloaded the scope off the wire, left the wire in place as a safety, and then reinserted the scope using a second sensor wire as a guide into the distal right ureter. It was here I encountered a 3-mm stone that appeared to be completely obstructing the ureter about a third of the way up. I inserted a 365 micron laser fiber with the settings of 0.8 joules and 8 Hz. I lasered the stone into basketable pieces. A Tricep basket was used to remove the pieces and deposit them in the bladder. After I had cleared the ureter of stone, I inspected the entire course of the ureter. It was completely clear of any additional stone or other abnormalities. I then removed my ureteroscope leaving the wire in place. I then drained the bladder and sent the stone fragments off for stone analysis. I loaded the wire onto the rigid cystoscope and passed a 6 x 26 double-J right ureteral stent without strings over the wire under fluoroscopic guidance into the right renal pelvis. Once the stent was in position, I pulled the wire noting good curl in the right renal pelvis as well as the patient's bladder. I then placed a Jorge catheter with 10 mL sterile water in the balloon and left it to drainage.   The patient will be admitted to the hospital for observation and close monitoring given his poor kidney function due to complete obstruction from his stone. He was awoken from anesthesia and transferred to PACU in stable condition. There were no complications. All counts were correct at the end of the procedure.       Pari Loya MD      [id number]  D:  06/16/2021 1:56  T:  06/16/2021 4:26  JOB #:  [id number]

## 2021-06-16 NOTE — BRIEF OP NOTE
Brief Postoperative Note    Patient: Colonel Alan  YOB: 1955  MRN: [id number]    Date of Procedure: 6/16/2021     Pre-Op Diagnosis: Urinary retention [R33.9]    Post-Op Diagnosis: Same as preoperative diagnosis. Procedure(s):  CYSTOSCOPY  RIGHT URETEROSCOPY, LASER LITHotrIPSY, BASKET STONE EXTRACTION, URETERAL STENT PLACEMENT    Surgeon(s): Klever Wilburn MD    Surgical Assistant: None    Anesthesia: General     Estimated Blood Loss (mL): Minimal    Complications: None    Specimens:   ID Type Source Tests Collected by Time Destination   1 : kidney stones Fresh Nagi Dubon MD 6/16/2021 0133 Pathology        Implants:   Implant Name Type Inv.  Item Serial No.  Lot No. LRB No. Used Action   STENT URET 6F 26CM -- PERCUFLEX PLUS [id number] - [id number]  STENT URET 6F 26CM -- [facility] [id number]  [employer] [facility]_ [id number] Right 1 Implanted       Drains: Jorge catheter 16 Fr    Findings: 3 MM OBSTRUCTING right distal ureteral stone causing hydro-ureteronephrosis behind the stone and complete visual obstruction of solitary right kidney    Electronically Signed by Jamila Catalan MD on 6/16/2021 at 1:48 AM

## 2021-06-17 VITALS
WEIGHT: 280 LBS | TEMPERATURE: 98.3 F | OXYGEN SATURATION: 93 % | RESPIRATION RATE: 18 BRPM | HEART RATE: 87 BPM | DIASTOLIC BLOOD PRESSURE: 81 MMHG | BODY MASS INDEX: 40.09 KG/M2 | HEIGHT: 70 IN | SYSTOLIC BLOOD PRESSURE: 132 MMHG

## 2021-06-17 LAB
ANION GAP SERPL CALC-SCNC: 6 MMOL/L (ref 7–16)
BACTERIA SPEC CULT: NORMAL
BACTERIA SPEC CULT: NORMAL
BASOPHILS # BLD: 0 K/UL (ref 0–0.2)
BASOPHILS NFR BLD: 0 % (ref 0–2)
BUN SERPL-MCNC: 31 MG/DL (ref 8–23)
CALCIUM SERPL-MCNC: 8 MG/DL (ref 8.3–10.4)
CHLORIDE SERPL-SCNC: 109 MMOL/L (ref 98–107)
CO2 SERPL-SCNC: 28 MMOL/L (ref 21–32)
CREAT SERPL-MCNC: 1.95 MG/DL (ref 0.8–1.5)
DIFFERENTIAL METHOD BLD: ABNORMAL
EOSINOPHIL # BLD: 0 K/UL (ref 0–0.8)
EOSINOPHIL NFR BLD: 0 % (ref 0.5–7.8)
ERYTHROCYTE [DISTWIDTH] IN BLOOD BY AUTOMATED COUNT: 17.2 % (ref 11.9–14.6)
EST. AVERAGE GLUCOSE BLD GHB EST-MCNC: 220 MG/DL
GLUCOSE BLD STRIP.AUTO-MCNC: 190 MG/DL (ref 65–100)
GLUCOSE BLD STRIP.AUTO-MCNC: 242 MG/DL (ref 65–100)
GLUCOSE SERPL-MCNC: 158 MG/DL (ref 65–100)
HBA1C MFR BLD: 9.3 % (ref 4.2–6.3)
HCT VFR BLD AUTO: 30.9 % (ref 41.1–50.3)
HGB BLD-MCNC: 9.4 G/DL (ref 13.6–17.2)
IMM GRANULOCYTES # BLD AUTO: 0.1 K/UL (ref 0–0.5)
IMM GRANULOCYTES NFR BLD AUTO: 0 % (ref 0–5)
LYMPHOCYTES # BLD: 1.6 K/UL (ref 0.5–4.6)
LYMPHOCYTES NFR BLD: 13 % (ref 13–44)
MAGNESIUM SERPL-MCNC: 2.2 MG/DL (ref 1.8–2.4)
MCH RBC QN AUTO: 20 PG (ref 26.1–32.9)
MCHC RBC AUTO-ENTMCNC: 30.4 G/DL (ref 31.4–35)
MCV RBC AUTO: 65.7 FL (ref 79.6–97.8)
MONOCYTES # BLD: 1 K/UL (ref 0.1–1.3)
MONOCYTES NFR BLD: 9 % (ref 4–12)
NEUTS SEG # BLD: 9 K/UL (ref 1.7–8.2)
NEUTS SEG NFR BLD: 77 % (ref 43–78)
NRBC # BLD: 0 K/UL (ref 0–0.2)
PLATELET # BLD AUTO: 168 K/UL (ref 150–450)
PMV BLD AUTO: 11.3 FL (ref 9.4–12.3)
POTASSIUM SERPL-SCNC: 4.2 MMOL/L (ref 3.5–5.1)
RBC # BLD AUTO: 4.7 M/UL (ref 4.23–5.6)
SERVICE CMNT-IMP: ABNORMAL
SERVICE CMNT-IMP: ABNORMAL
SERVICE CMNT-IMP: NORMAL
SODIUM SERPL-SCNC: 143 MMOL/L (ref 136–145)
WBC # BLD AUTO: 11.7 K/UL (ref 4.3–11.1)

## 2021-06-17 PROCEDURE — 74011000250 HC RX REV CODE- 250: Performed by: UROLOGY

## 2021-06-17 PROCEDURE — 74011250637 HC RX REV CODE- 250/637: Performed by: FAMILY MEDICINE

## 2021-06-17 PROCEDURE — 83036 HEMOGLOBIN GLYCOSYLATED A1C: CPT

## 2021-06-17 PROCEDURE — 36415 COLL VENOUS BLD VENIPUNCTURE: CPT

## 2021-06-17 PROCEDURE — 74011636637 HC RX REV CODE- 636/637: Performed by: INTERNAL MEDICINE

## 2021-06-17 PROCEDURE — 80048 BASIC METABOLIC PNL TOTAL CA: CPT

## 2021-06-17 PROCEDURE — 99232 SBSQ HOSP IP/OBS MODERATE 35: CPT | Performed by: UROLOGY

## 2021-06-17 PROCEDURE — 74011250636 HC RX REV CODE- 250/636: Performed by: UROLOGY

## 2021-06-17 PROCEDURE — 83735 ASSAY OF MAGNESIUM: CPT

## 2021-06-17 PROCEDURE — 85025 COMPLETE CBC W/AUTO DIFF WBC: CPT

## 2021-06-17 PROCEDURE — 82962 GLUCOSE BLOOD TEST: CPT

## 2021-06-17 RX ORDER — CEFAZOLIN SODIUM/WATER 2 G/20 ML
2 SYRINGE (ML) INTRAVENOUS
Status: DISCONTINUED | OUTPATIENT
Start: 2021-06-17 | End: 2021-06-17 | Stop reason: SDUPTHER

## 2021-06-17 RX ORDER — LEVOTHYROXINE SODIUM 200 UG/1
200 TABLET ORAL
Qty: 90 TABLET | Refills: 3 | Status: SHIPPED
Start: 2021-06-17 | End: 2021-07-02 | Stop reason: SDUPTHER

## 2021-06-17 RX ORDER — LEVOTHYROXINE SODIUM 50 UG/1
50 TABLET ORAL
Qty: 30 TABLET | Refills: 1 | Status: SHIPPED | OUTPATIENT
Start: 2021-06-17 | End: 2021-07-02 | Stop reason: DRUGHIGH

## 2021-06-17 RX ADMIN — INSULIN LISPRO 2 UNITS: 100 INJECTION, SOLUTION INTRAVENOUS; SUBCUTANEOUS at 08:50

## 2021-06-17 RX ADMIN — LOSARTAN POTASSIUM 25 MG: 25 TABLET, FILM COATED ORAL at 08:49

## 2021-06-17 RX ADMIN — PRAVASTATIN SODIUM 40 MG: 20 TABLET ORAL at 08:49

## 2021-06-17 RX ADMIN — LEVOTHYROXINE SODIUM 75 MCG: 0.07 TABLET ORAL at 05:13

## 2021-06-17 RX ADMIN — Medication 10 ML: at 05:16

## 2021-06-17 RX ADMIN — HYDROCHLOROTHIAZIDE 25 MG: 25 TABLET ORAL at 08:49

## 2021-06-17 RX ADMIN — ACETAMINOPHEN 650 MG: 325 TABLET ORAL at 06:00

## 2021-06-17 RX ADMIN — CEFAZOLIN SODIUM 2 G: 100 INJECTION, POWDER, LYOPHILIZED, FOR SOLUTION INTRAVENOUS at 01:19

## 2021-06-17 RX ADMIN — LEVOTHYROXINE SODIUM 200 MCG: 0.1 TABLET ORAL at 05:12

## 2021-06-17 RX ADMIN — INSULIN LISPRO 20 UNITS: 100 INJECTION, SOLUTION INTRAVENOUS; SUBCUTANEOUS at 08:50

## 2021-06-17 NOTE — PROGRESS NOTES
Urology Progress Note    Admit Date: 6/15/2021    Subjective:     Patient has no new complaints. Catheter draining. Pain controlled. Cr trending back to baseline at 1.95.  GOod UOP. Objective:     Patient Vitals for the past 8 hrs:   BP Temp Pulse Resp SpO2 Weight   06/17/21 0643 131/81 97.4 °F (36.3 °C) 93 20 92 %    06/17/21 0346      280 lb (127 kg)   06/17/21 0335 (!) 143/77 98.6 °F (37 °C) 85 19 93 %      No intake/output data recorded. 06/15 1901 - 06/17 0700  In: 6851 [P.O.:720;  I.V.:1490]  Out: 4900 [Urine:4900]    Physical Exam:   Visit Vitals  /81   Pulse 93   Temp 97.4 °F (36.3 °C)   Resp 20   Ht 5' 10\" (1.778 m)   Wt 280 lb (127 kg)   SpO2 92%   BMI 40.18 kg/m²        GENERAL: No acute distress, Awake, Alert, Oriented X 3  CARDIAC: regular rate and rhythm  CHEST AND LUNG: Easy work of breathing  ABDOMEN: soft, non tender, non-distended  : Jorge draining clear yellow/pink tinge          Data Review   Recent Results (from the past 24 hour(s))   GLUCOSE, POC    Collection Time: 06/16/21  9:04 AM   Result Value Ref Range    Glucose (POC) 423 (H) 65 - 100 mg/dL    Performed by Abelardo Reynolds    GLUCOSE, POC    Collection Time: 06/16/21  3:58 PM   Result Value Ref Range    Glucose (POC) 241 (H) 65 - 100 mg/dL    Performed by Abelardo Reynolds    GLUCOSE, POC    Collection Time: 06/16/21  9:08 PM   Result Value Ref Range    Glucose (POC) 200 (H) 65 - 100 mg/dL    Performed by WinsomeLUIS    HEMOGLOBIN A1C WITH EAG    Collection Time: 06/17/21  5:47 AM   Result Value Ref Range    Hemoglobin A1c 9.3 (H) 4.2 - 6.3 %    Est. average glucose 180 mg/dL   METABOLIC PANEL, BASIC    Collection Time: 06/17/21  5:47 AM   Result Value Ref Range    Sodium 143 136 - 145 mmol/L    Potassium 4.2 3.5 - 5.1 mmol/L    Chloride 109 (H) 98 - 107 mmol/L    CO2 28 21 - 32 mmol/L    Anion gap 6 (L) 7 - 16 mmol/L    Glucose 158 (H) 65 - 100 mg/dL    BUN 31 (H) 8 - 23 MG/DL    Creatinine 1.95 (H) 0.8 - 1.5 MG/DL    GFR est AA 44 (L) >60 ml/min/1.73m2    GFR est non-AA 37 (L) >60 ml/min/1.73m2    Calcium 8.0 (L) 8.3 - 10.4 MG/DL   MAGNESIUM    Collection Time: 06/17/21  5:47 AM   Result Value Ref Range    Magnesium 2.2 1.8 - 2.4 mg/dL   CBC WITH AUTOMATED DIFF    Collection Time: 06/17/21  5:47 AM   Result Value Ref Range    WBC 11.7 (H) 4.3 - 11.1 K/uL    RBC 4.70 4.23 - 5.6 M/uL    HGB 9.4 (L) 13.6 - 17.2 g/dL    HCT 30.9 (L) 41.1 - 50.3 %    MCV 65.7 (L) 79.6 - 97.8 FL    MCH 20.0 (L) 26.1 - 32.9 PG    MCHC 30.4 (L) 31.4 - 35.0 g/dL    RDW 17.2 (H) 11.9 - 14.6 %    PLATELET 233 127 - 763 K/uL    MPV 11.3 9.4 - 12.3 FL    ABSOLUTE NRBC 0.00 0.0 - 0.2 K/uL    DF AUTOMATED      NEUTROPHILS 77 43 - 78 %    LYMPHOCYTES 13 13 - 44 %    MONOCYTES 9 4.0 - 12.0 %    EOSINOPHILS 0 (L) 0.5 - 7.8 %    BASOPHILS 0 0.0 - 2.0 %    IMMATURE GRANULOCYTES 0 0.0 - 5.0 %    ABS. NEUTROPHILS 9.0 (H) 1.7 - 8.2 K/UL    ABS. LYMPHOCYTES 1.6 0.5 - 4.6 K/UL    ABS. MONOCYTES 1.0 0.1 - 1.3 K/UL    ABS. EOSINOPHILS 0.0 0.0 - 0.8 K/UL    ABS. BASOPHILS 0.0 0.0 - 0.2 K/UL    ABS. IMM.  GRANS. 0.1 0.0 - 0.5 K/UL   GLUCOSE, POC    Collection Time: 06/17/21  6:40 AM   Result Value Ref Range    Glucose (POC) 190 (H) 65 - 100 mg/dL    Performed by Ayden            Assessment:     Principal Problem:    Acute renal failure superimposed on stage 3a chronic kidney disease ([facility] [address] (6/15/2021)    Active Problems:    Postsurgical hypothyroidism (3/8/2017)      Essential hypertension (3/8/2017)      Class 2 severe obesity due to excess calories with serious comorbidity and body mass index (BMI) of 38.0 to 38.9 in adult [facility]) (8/29/2019)      Pancreatic mass (5/15/2020)      Uncontrolled type 2 diabetes mellitus with microalbuminuria, with long-term current use of insulin ([facility] [address] (11/18/2020)      Stage 3a chronic kidney disease ([facility] [address] (11/18/2020)      Ureteral obstruction (6/15/2021)      POD 2 s/p R ureteroscopy/stent placement, admitted for MARTIN on CKD    Plan: -Jorge out today  -OK for discharge today from urology standpoint  -Will arrange outpatient cystoscopy/right ureteral stent removal in office in about 1 week. Office will call patient to schedule  -Rest of care per primary team  -Will sign off. Call with questions. Iraj Patterson M.D.     [facility]  [facility]  [address] [place] [address] [address]  Phone: [phone]  Fax: [phone]

## 2021-06-17 NOTE — ADDENDUM NOTE
Addendum  created 06/17/21 1455 by Zeyad Carty CRNA    Flowsheet accepted, Intraprocedure Flowsheets edited

## 2021-06-17 NOTE — DISCHARGE SUMMARY
Hospitalist Discharge Summary     Admit Date:  6/15/2021  9:28 PM   DC note date: 2021  Name:  Genna Reynaga   Age:  76 y.o.  :  1955   MRN:  [id number]   PCP:  Ernst Watkins DO  Treatment Team: Attending Provider: Vijaya Espinoza MD; Consulting Provider: Neftali Pérez MD; Utilization Review: Buck Salas; Care Manager: Anitha Daniel; Charge Nurse: Satinder Israel RN  Presenting Complaint: Blood in Urine    Initial Admission Diagnosis: Ureteral obstruction [N13.5]  MARTIN (acute kidney injury) ([facility] [N17.9]     Problem List for this Hospitalization:  Hospital Problems as of 2021 Date Reviewed: 2021        Codes Class Noted - Resolved POA    Ureteral obstruction ICD-10-CM: N13.5  ICD-9-CM: 593.4  6/15/2021 - Present Yes        * (Principal) Acute renal failure superimposed on stage 3a chronic kidney disease ([facility] ICD-10-CM: N17.9, N18.31  ICD-9-CM: 584.9, 585.3  6/15/2021 - Present Yes        Uncontrolled type 2 diabetes mellitus with microalbuminuria, with long-term current use of insulin (HCC) (Chronic) ICD-10-CM: E11.29, E11.65, R80.9, Z79.4  ICD-9-CM: 250.42, 791.0, V58.67  2020 - Present Yes        Stage 3a chronic kidney disease ([facility] (Chronic) ICD-10-CM: N18.31  ICD-9-CM: 585.3  2020 - Present Yes        Pancreatic mass (Chronic) ICD-10-CM: K86.89  ICD-9-CM: 577.8  5/15/2020 - Present Yes        Class 2 severe obesity due to excess calories with serious comorbidity and body mass index (BMI) of 38.0 to 38.9 in adult [facility]) (Chronic) ICD-10-CM: E66.01, Z68.38  ICD-9-CM: 278.01, V85.38  2019 - Present Yes        Postsurgical hypothyroidism (Chronic) ICD-10-CM: E89.0  ICD-9-CM: 244.0  3/8/2017 - Present Yes        Essential hypertension (Chronic) ICD-10-CM: I10  ICD-9-CM: 401.9  3/8/2017 - Present Yes                Admission HPI from 6/15/2021:    \" Patient is a 76 y.o. male with who presented to the ED with reports of gross hematuria.   He does have a known kidney stone but believes that he passed it in the last couple of days. He also reports that today he started having some right flank pain and has had some nausea and vomiting. He also reports mild fever and some chills. He does state this feels similar to previous kidney stones. Of note, he only has a single kidney. He denies chest pain, shortness of breath, cough. ER work-up with CT scan does show a mildly obstructing distal right ureteral stone. His creatinine has also increased significantly. ER discussed findings with urology and they will come in for emergency surgery tonight. \"    Hospital Course:  Admitted with MARTIN due to obstruction. he had emergent cystoscopy, right ureteroscopy, laser lithotripsy, basket stone extraction, right ureteral stent placement 6/16. His kidney function and UOP have improved significantly since then. Urology removed aleman and they will arrange follow up in 1 week for outpt cysto/stent removal.    His TSH was undetectable. Reviewed endocrinology notes from 3/26, 3/29 indicating the dose has been slowly adjusted downward. I think enough time has passed that this repeat TSH is valid so I will decrease the dose slightly again from 275mcg to 250mcg; defer to his endocrinologist on this. noted  \"history of thyroid cancer status post total thyroidectomy in 2008 in [place], status post AMOR-131 (dose unknown).  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       Follow-up Information     Follow up With Specialties Details Why Contact Yashira Mcdaniel, DO Family Medicine Call As needed [address] [facility] [address] [place] [phone] [facility] UROLOGY   they should call you to schedule follow up in 1 week. please call them if you do not hear from them soon Sherry Carroll 35          Time spent in patient discharge and coordination 31 minutes. Plan was discussed with pt. All questions answered. Patient was stable at time of discharge. Given instructions to call a physician or return if any concerns. Discharge summary and encounter summary was sent to PCP electronically via \"Comm Mgt\" link in [facility], if possible. Discharge Info:   Current Discharge Medication List      CONTINUE these medications which have CHANGED    Details   !! levothyroxine (SYNTHROID) 50 mcg tablet Take 1 Tablet by mouth Daily (before breakfast). Total daily dose 250 mcg  Qty: 30 Tablet, Refills: 1  Start date: 6/17/2021    Associated Diagnoses: Postsurgical hypothyroidism      !! levothyroxine (SYNTHROID) 200 mcg tablet Take 1 Tablet by mouth Daily (before breakfast). Total daily dose 250 mcg  Qty: 90 Tablet, Refills: 3  Start date: 6/17/2021    Associated Diagnoses: Postsurgical hypothyroidism       !! - Potential duplicate medications found. Please discuss with provider. CONTINUE these medications which have NOT CHANGED    Details   Lantus Solostar U-100 Insulin 100 unit/mL (3 mL) inpn 65 Units by SubCUTAneous route daily. Qty: 20 Pen, Refills: 3    Associated Diagnoses: Uncontrolled type 2 diabetes mellitus with microalbuminuria, with long-term current use of insulin ([facility])      Trulicity 1.5 CS/6.5 mL sub-q pen 0.5 mL by SubCUTAneous route every seven (7) days. Qty: 12 Syringe, Refills: 3    Associated Diagnoses: Uncontrolled type 2 diabetes mellitus with microalbuminuria, with long-term current use of insulin (HCC)      metFORMIN ER (GLUCOPHAGE XR) 500 mg tablet Take 1 Tab by mouth two (2) times daily (with meals).   Qty: 180 Tab, Refills: 3    Associated Diagnoses: Uncontrolled type 2 diabetes mellitus with microalbuminuria, with long-term current use of insulin (HCC)      losartan (COZAAR) 25 mg tablet Take 1 Tab by mouth daily. Qty: 90 Tab, Refills: 3    Associated Diagnoses: Essential hypertension      hydroCHLOROthiazide (HYDRODIURIL) 25 mg tablet Take 1 Tab by mouth daily. Qty: 90 Tab, Refills: 3    Associated Diagnoses: Essential hypertension      pravastatin (PRAVACHOL) 40 mg tablet Take 1 Tab by mouth daily. Qty: 90 Tab, Refills: 3    Associated Diagnoses: Mixed hyperlipidemia      cinnamon bark (CINNAMON PO) Take  by mouth.      pen needle, diabetic (NovoTwist) 32 gauge x 1/5\" ndle INJECT UNDER THE SKIN ONCE DAILY  Qty: 100 Pen Needle, Refills: 3      cholecalciferol, vitamin D3, (Vitamin D3) 50 mcg (2,000 unit) tab Take  by mouth. ascorbic acid, vitamin C, (VITAMIN C) 250 mg tablet Take  by mouth daily. glucosamine-chondroitin (ARTHX) 500-400 mg cap Take 1 Cap by mouth daily. allopurinoL (ZYLOPRIM) 300 mg tablet Take 1 Tablet by mouth daily. Qty: 30 Tablet, Refills: 12    Associated Diagnoses: Renal stone      potassium citrate (Urocit-K 15) 15 mEq TbER tablet Take 2 Tablets by mouth two (2) times a day. Qty: 60 Tablet, Refills: 12    Associated Diagnoses: Renal stone             Procedures done this admission:  Procedure(s):  CYSTOSCOPY URETERAL STENT INSERTION RIGHT URETEROSCOPY, LASER LITH, BASKET STONE EXTRACTION    Consults this admission:  None    Echocardiogram/EKG results:  No results found for this visit on 06/15/21. Results for orders placed or performed during the hospital encounter of 08/17/20   EKG, 12 LEAD, INITIAL   Result Value Ref Range    Ventricular Rate 99 BPM    Atrial Rate 99 BPM    P-R Interval 164 ms    QRS Duration 90 ms    Q-T Interval 336 ms    QTC Calculation (Bezet) 431 ms    Calculated P Axis 51 degrees    Calculated R Axis -14 degrees    Calculated T Axis 33 degrees    Diagnosis       !! AGE AND GENDER SPECIFIC ECG ANALYSIS !!   Normal sinus rhythm  Normal ECG  No previous ECGs available  Confirmed by REEMA BARNES (), Nabil Jan ([id number]) on 8/18/2020 9:04:45 AM     Results for orders placed or performed in visit on 09/15/17   AMB POC EKG ROUTINE W/ 12 LEADS, INTER & REP    Impression    Nsr, rate 86, no st/twave changes       Diagnostic Imaging/Tests:   CT ABD/PEL FOR RENAL STONE    Result Date: 6/15/2021  EXAM: Noncontrast CT abdomen and pelvis. INDICATION: Abdominal pain. COMPARISON: Prior CT abdomen and pelvis on May 4, 2021. TECHNIQUE: Axial CT images of the abdomen and pelvis were obtained without IV contrast. Radiation dose reduction techniques were used for this study. Our CT scanners use one or all of the following:  Automated exposure control, adjustment of the mA or kV according to patient size, iterative reconstruction. FINDINGS: - Liver: Within normal limits. - Gallbladder and bile ducts: Within normal limits. - Spleen: Within normal limits. - Urinary tract: There has been a left nephrectomy. There is a 3 mm stone in the distal right ureter, causing mild right-sided hydronephrosis. There are also a few right kidney stones measuring up to 8 mm in diameter. The urinary bladder is collapsed. - Adrenals: Within normal limits. - Pancreas: No significant change in the 3.4 and 4.2 cm masses or complex cysts in the body of the pancreas. - Gastrointestinal tract: Within normal limits. - Retroperitoneum: Mild abdominal aortic atherosclerosis, with no aneurysmal dilatation. - Peritoneal cavity and abdominal wall: No ascites or free intraperitoneal air. - Pelvis: There is a fat-containing left inguinal hernia. - Spine/bones: No acute process. - Other comments: The lung bases are clear. 1. Mildly obstructing 3 mm distal right ureter stone. 2. Right kidney stones. 3. Prior left nephrectomy. 4. Unchanged complex cysts or masses in the pancreas.        All Micro Results     Procedure Component Value Units Date/Time    CULTURE, URINE [[id number]] Collected: 06/16/21 0006 Order Status: Completed Specimen: Urine from Clean catch Updated: 06/16/21 0823     Special Requests: NO SPECIAL REQUESTS        Culture result:       NO GROWTH AFTER SHORT PERIOD OF INCUBATION. FURTHER RESULTS TO FOLLOW AFTER OVERNIGHT INCUBATION.                 SARS-CoV-2 Lab Results  \"Novel Coronavirus\" Test: No results found for: COV2NT   \"Emergent Disease\" Test: No results found for: EDPR  \"SARS-COV-2\" Test: No results found for: XGCOVT  Rapid Test: No results found for: COVR         Labs: Results:       BMP, Mg, Phos Recent Labs     06/17/21  0547 06/16/21  0514 06/15/21  2127    139 137   K 4.2 4.6 4.0   * 105 103   CO2 28 28 25   AGAP 6* 6* 9   BUN 31* 31* 29*   CREA 1.95* 3.36* 3.32*   CA 8.0* 8.5 8.9   * 301* 266*   MG 2.2  --   --       CBC Recent Labs     06/17/21  0547 06/16/21  0514 06/15/21  2127   WBC 11.7* 14.8* 15.7*   RBC 4.70 5.27 5.85*   HGB 9.4* 10.4* 11.6*   HCT 30.9* 34.7* 37.7*    171 217   GRANS 77 93* 87*   LYMPH 13 3* 6*   EOS 0* 0* 0*   MONOS 9 3* 6   BASOS 0 0 0   IG 0 1 1   ANEU 9.0* 13.7* 13.7*   ABL 1.6 0.4* 0.9   MARISELA 0.0 0.0 0.0   ABM 1.0 0.5 1.0   ABB 0.0 0.0 0.0   AIG 0.1 0.1 0.1      LFT Recent Labs     06/15/21  2127   ALT 43   AP 77   TP 8.0   ALB 3.9   GLOB 4.1*   AGRAT 1.0*      Cardiac Testing Lab Results   Component Value Date/Time    CK 70 08/19/2020 04:47 AM      Coagulation Tests No results found for: PTP, INR, APTT, INREXT   A1c Lab Results   Component Value Date/Time    Hemoglobin A1c 9.3 (H) 06/17/2021 05:47 AM    Hemoglobin A1c 8.3 (H) 10/29/2020 08:55 AM    Hemoglobin A1c 8.4 (H) 06/10/2020 10:39 AM      Lipid Panel Lab Results   Component Value Date/Time    Cholesterol, total 149 02/05/2021 09:27 AM    HDL Cholesterol 43 02/05/2021 09:27 AM    LDL, calculated 71 02/05/2021 09:27 AM    LDL, calculated 108 (H) 06/10/2020 10:39 AM    VLDL, calculated 35 02/05/2021 09:27 AM    VLDL, calculated 26 06/10/2020 10:39 AM    Triglyceride 212 (H) 02/05/2021 09:27 AM      Thyroid Panel Lab Results   Component Value Date/Time    TSH <0.005 (L) 06/16/2021 05:13 AM    TSH <0.005 (L) 03/26/2021 02:13 PM    TSH 0.005 (L) 02/02/2021 12:31 PM    TSH 0.031 (L) 10/29/2020 08:55 AM        Most Recent UA Lab Results   Component Value Date/Time    Color YELLOW 08/18/2020 03:00 PM    Appearance CLEAR 08/18/2020 03:00 PM    Specific gravity 1.015 08/18/2020 03:00 PM    pH (UA) 5.5 08/18/2020 03:00 PM    Protein Negative 08/18/2020 03:00 PM    Glucose Negative 08/18/2020 03:00 PM    Ketone Negative 08/18/2020 03:00 PM    Bilirubin Negative 08/18/2020 03:00 PM    Blood LARGE (A) 08/18/2020 03:00 PM    Urobilinogen 0.2 08/18/2020 03:00 PM    Nitrites Negative 08/18/2020 03:00 PM    Leukocyte Esterase TRACE (A) 08/18/2020 03:00 PM    WBC 3-5 08/18/2020 03:00 PM    RBC 10-20 08/18/2020 03:00 PM    Epithelial cells 0-3 08/18/2020 03:00 PM    Bacteria 0 08/18/2020 03:00 PM    Mucus TRACE 08/18/2020 03:00 PM    Other observations RESULTS VERIFIED MANUALLY 08/18/2020 03:00 PM          All Labs from Last 24 Hrs:  Recent Results (from the past 24 hour(s))   GLUCOSE, POC    Collection Time: 06/16/21  3:58 PM   Result Value Ref Range    Glucose (POC) 241 (H) 65 - 100 mg/dL    Performed by [facility]    GLUCOSE, POC    Collection Time: 06/16/21  9:08 PM   Result Value Ref Range    Glucose (POC) 200 (H) 65 - 100 mg/dL    Performed by [facility]    HEMOGLOBIN A1C WITH EAG    Collection Time: 06/17/21  5:47 AM   Result Value Ref Range    Hemoglobin A1c 9.3 (H) 4.2 - 6.3 %    Est. average glucose 634 mg/dL   METABOLIC PANEL, BASIC    Collection Time: 06/17/21  5:47 AM   Result Value Ref Range    Sodium 143 136 - 145 mmol/L    Potassium 4.2 3.5 - 5.1 mmol/L    Chloride 109 (H) 98 - 107 mmol/L    CO2 28 21 - 32 mmol/L    Anion gap 6 (L) 7 - 16 mmol/L    Glucose 158 (H) 65 - 100 mg/dL    BUN 31 (H) 8 - 23 MG/DL    Creatinine 1.95 (H) 0.8 - 1.5 MG/DL    GFR est AA 44 (L) >60 ml/min/1.73m2    GFR est non-AA 37 (L) >60 ml/min/1.73m2    Calcium 8.0 (L) 8.3 - 10.4 MG/DL   MAGNESIUM    Collection Time: 06/17/21  5:47 AM   Result Value Ref Range    Magnesium 2.2 1.8 - 2.4 mg/dL   CBC WITH AUTOMATED DIFF    Collection Time: 06/17/21  5:47 AM   Result Value Ref Range    WBC 11.7 (H) 4.3 - 11.1 K/uL    RBC 4.70 4.23 - 5.6 M/uL    HGB 9.4 (L) 13.6 - 17.2 g/dL    HCT 30.9 (L) 41.1 - 50.3 %    MCV 65.7 (L) 79.6 - 97.8 FL    MCH 20.0 (L) 26.1 - 32.9 PG    MCHC 30.4 (L) 31.4 - 35.0 g/dL    RDW 17.2 (H) 11.9 - 14.6 %    PLATELET 276 986 - 118 K/uL    MPV 11.3 9.4 - 12.3 FL    ABSOLUTE NRBC 0.00 0.0 - 0.2 K/uL    DF AUTOMATED      NEUTROPHILS 77 43 - 78 %    LYMPHOCYTES 13 13 - 44 %    MONOCYTES 9 4.0 - 12.0 %    EOSINOPHILS 0 (L) 0.5 - 7.8 %    BASOPHILS 0 0.0 - 2.0 %    IMMATURE GRANULOCYTES 0 0.0 - 5.0 %    ABS. NEUTROPHILS 9.0 (H) 1.7 - 8.2 K/UL    ABS. LYMPHOCYTES 1.6 0.5 - 4.6 K/UL    ABS. MONOCYTES 1.0 0.1 - 1.3 K/UL    ABS. EOSINOPHILS 0.0 0.0 - 0.8 K/UL    ABS. BASOPHILS 0.0 0.0 - 0.2 K/UL    ABS. IMM.  GRANS. 0.1 0.0 - 0.5 K/UL   GLUCOSE, POC    Collection Time: 06/17/21  6:40 AM   Result Value Ref Range    Glucose (POC) 190 (H) 65 - 100 mg/dL    Performed by [facility]        Discharge Exam:  Patient Vitals for the past 24 hrs:   Temp Pulse Resp BP SpO2   06/17/21 0643 97.4 °F (36.3 °C) 93 20 131/81 92 %   06/17/21 0335 98.6 °F (37 °C) 85 19 (!) 143/77 93 %   06/16/21 2346 98.7 °F (37.1 °C) 90 18 (!) 153/82 97 %   06/16/21 1906 98.4 °F (36.9 °C) 97 18 133/69 92 %   06/16/21 1556 98 °F (36.7 °C) 98 18 128/85 95 %   06/16/21 1117 98.2 °F (36.8 °C) 100 18 130/78 94 %     Oxygen Therapy  O2 Sat (%): 92 % (06/17/21 0643)  Pulse via Oximetry: 107 beats per minute (06/16/21 0251)  O2 Device: Nasal cannula (06/16/21 1433)  Skin Assessment: Clean, dry, & intact (06/16/21 1433)  Skin Protection for O2 Device: N/A (06/16/21 1433)  O2 Flow Rate (L/min): 3 l/min (06/16/21 1433)    Estimated body mass index is 40.18 kg/m² as calculated from the following:    Height as of this encounter: 5' 10\" (1.778 m). Weight as of this encounter: 127 kg (280 lb). Intake/Output Summary (Last 24 hours) at 6/17/2021 0942  Last data filed at 6/17/2021 0854  Gross per 24 hour   Intake 2715 ml   Output 4900 ml   Net -2185 ml       *Note that automatically entered I/Os may not be accurate; dependent on patient compliance with collection and accurate  by assistants. General:    Well nourished. No overt distress  Eyes:   Normal sclerae. Extraocular movements intact. ENT:  Normocephalic, atraumatic. Moist mucous membranes  CV:   Regular rate and rhythm. No edema. Lungs:  Even,  Unlabored  Abdomen: nondistended. Extremities: Warm and dry. No cyanosis or clubbing  Neurologic: CN II-XII grossly intact. No gross focal deficits. Alert. Skin:     No rashes. No jaundice. Psych:  Normal mood and affect.     Current Med List in Hospital:   Current Facility-Administered Medications   Medication Dose Route Frequency    allopurinoL (ZYLOPRIM) tablet 300 mg  300 mg Oral DAILY    hydroCHLOROthiazide (HYDRODIURIL) tablet 25 mg  25 mg Oral DAILY    insulin glargine (LANTUS) injection 65 Units  65 Units SubCUTAneous QHS    levothyroxine (SYNTHROID) tablet 200 mcg  200 mcg Oral ACB    levothyroxine (SYNTHROID) tablet 75 mcg  75 mcg Oral ACB    losartan (COZAAR) tablet 25 mg  25 mg Oral DAILY    pravastatin (PRAVACHOL) tablet 40 mg  40 mg Oral DAILY    dextrose 40% (GLUTOSE) oral gel 1 Tube  15 g Oral PRN    glucagon (GLUCAGEN) injection 1 mg  1 mg IntraMUSCular PRN    dextrose (D50W) injection syrg 12.5-25 g  25-50 mL IntraVENous PRN    sodium chloride (NS) flush 5-40 mL  5-40 mL IntraVENous Q8H    sodium chloride (NS) flush 5-40 mL  5-40 mL IntraVENous PRN    acetaminophen (TYLENOL) tablet 650 mg  650 mg Oral Q6H PRN    Or    acetaminophen (TYLENOL) suppository 650 mg  650 mg Rectal Q6H PRN    polyethylene glycol (MIRALAX) packet 17 g  17 g Oral DAILY    promethazine (PHENERGAN) tablet 12.5 mg  12.5 mg Oral Q6H PRN    Or    ondansetron (ZOFRAN) injection 4 mg  4 mg IntraVENous Q6H PRN    insulin lispro (HUMALOG) injection   SubCUTAneous AC&HS    insulin lispro (HUMALOG) injection 20 Units  20 Units SubCUTAneous TIDAC    sodium chloride (NS) flush 5-10 mL  5-10 mL IntraVENous Q8H    sodium chloride (NS) flush 5-10 mL  5-10 mL IntraVENous PRN       No Known Allergies  Immunization History   Administered Date(s) Administered    Pneumococcal Polysaccharide (PPSV-23) 09/13/2019       Signed:  Colleen Linda MD

## 2021-06-17 NOTE — PROGRESS NOTES
Patient is up for discharge. Patient will be getting discharged home with no supportive care needs at this time. Care Management Interventions  PCP Verified by CM:  Yes (Dr. Yash Kelley)  Transition of Care Consult (CM Consult): Discharge Planning  Physical Therapy Consult: No  Occupational Therapy Consult: No  Speech Therapy Consult: No  Current Support Network: Lives with Spouse  Discharge Location  Discharge Placement: Home

## 2021-06-17 NOTE — DISCHARGE INSTRUCTIONS
Patient Education        Ureteral Stent Placement: What to Expect at Home  Your Recovery     A ureteral (say \"you-REE-ter-ul\") stent is a thin, hollow tube that is placed in the ureter to help urine pass from the kidney into the bladder. Ureters are the tubes that connect the kidneys to the bladder. You may have a small amount of blood in your urine for 1 to 3 days after the procedure. While the stent is in place, you may have to urinate more often, feel a sudden need to urinate, or feel like you can't completely empty your bladder. You may feel some pain when you urinate or do strenuous activity. You also may notice a small amount of blood in your urine after strenuous activities. These side effects usually don't prevent people from doing their normal daily activities. You may have a thin string coming out of your urethra. Your urethra is the tube that carries urine from your bladder to outside your body. This string is attached to the stent. Try not to pull on the string. It will be used to pull out the stent when you no longer need it. After the procedure, urine may flow better from your kidneys to your bladder. A ureteral stent may be left in place for several days or for as long as several months. Your doctor will take it out when you no longer need it. Or, in some cases, it may be taken out at home. This care sheet gives you a general idea about how long it will take for you to recover. But each person recovers at a different pace. Follow the steps below to get better as quickly as possible. How can you care for yourself at home? Activity    · Rest when you feel tired. Getting enough sleep will help you recover.     · Avoid strenuous activities, such as bicycle riding, jogging, weight lifting, or aerobic exercise, until your doctor says it is okay.     · Ask your doctor when you can drive again.     · Most people are able to return to work the day after the procedure.  If your work requires intense activity, you may feel pain in your kidney area or get tired easily. If this happens, you may need to do less strenuous activities while the stent is in.     · Ask your doctor when it is okay for you to have sex. Diet    · You can eat your normal diet. If your stomach is upset, try bland, low-fat foods like plain rice, broiled chicken, toast, and yogurt.     · Drink plenty of fluids (unless your doctor tells you not to). Medicines    · Your doctor will tell you if and when you can restart your medicines. You will also get instructions about taking any new medicines.     · If you take aspirin or some other blood thinner, ask your doctor if and when to start taking it again. Make sure that you understand exactly what your doctor wants you to do.     · Be safe with medicines. Take pain medicines exactly as directed. ? If the doctor gave you a prescription medicine for pain, take it as prescribed. ? If you are not taking a prescription pain medicine, ask your doctor if you can take an over-the-counter medicine.     · If you think your pain medicine is making you sick to your stomach:  ? Take your medicine after meals (unless your doctor has told you not to). ? Ask your doctor for a different pain medicine.     · If your doctor prescribed antibiotics, take them as directed. Do not stop taking them just because you feel better. You need to take the full course of antibiotics. Follow-up care is a key part of your treatment and safety. Be sure to make and go to all appointments, and call your doctor if you are having problems. It's also a good idea to know your test results and keep a list of the medicines you take. When should you call for help? Call 911 anytime you think you may need emergency care.  For example, call if:    · You passed out (lost consciousness).     · You have severe trouble breathing.     · You have sudden chest pain and shortness of breath, or you cough up blood.     · You have severe belly pain.   Call your doctor now or seek immediate medical care if:    · Part or all of the stent comes out of your urethra.     · You have pain that does not get better after you take pain medicine.     · You have symptoms of a urinary infection. For example:  ? You have blood or pus in your urine. ? You have pain in your back just below your rib cage. This is called flank pain. ? You have a fever, chills, or body aches. ? It hurts to urinate. ? You have groin or belly pain.     · You cannot control when you urinate, or you leak urine. Watch closely for changes in your health, and be sure to contact your doctor if you have any problems. Where can you learn more? Go to http://www.gray.com/  Enter B869 in the search box to learn more about \"Ureteral Stent Placement: What to Expect at Home. \"  Current as of: June 29, 2020               Content Version: 12.8  © 2006-2021 [employer]. Care instructions adapted under license by [employer] (which disclaims liability or warranty for this information). If you have questions about a medical condition or this instruction, always ask your healthcare professional. [address] any warranty or liability for your use of this information.

## 2021-06-17 NOTE — PROGRESS NOTES
Discharge instructions given. Education provided. All questions answered and verbally voiced understanding. Medication changes and follow up appointments discussed. AVS reviewed, signed, and placed in chart. Copy provided for pt. Pt son in law aware of discharge and is planning to come  pt. Pt aware to call desk when ready to discharge.

## 2021-07-02 PROBLEM — E78.2 MIXED HYPERLIPIDEMIA: Status: ACTIVE | Noted: 2021-07-02

## 2021-07-13 ENCOUNTER — ANESTHESIA EVENT (OUTPATIENT)
Dept: ENDOSCOPY | Age: 66
End: 2021-07-13
Payer: MEDICARE

## 2021-07-13 ENCOUNTER — HOSPITAL ENCOUNTER (OUTPATIENT)
Age: 66
Setting detail: OUTPATIENT SURGERY
Discharge: HOME | End: 2021-07-13
Attending: INTERNAL MEDICINE | Admitting: INTERNAL MEDICINE
Payer: MEDICARE

## 2021-07-13 ENCOUNTER — ANESTHESIA (OUTPATIENT)
Dept: ENDOSCOPY | Age: 66
End: 2021-07-13
Payer: MEDICARE

## 2021-07-13 VITALS
SYSTOLIC BLOOD PRESSURE: 126 MMHG | HEIGHT: 70 IN | WEIGHT: 270 LBS | RESPIRATION RATE: 15 BRPM | BODY MASS INDEX: 38.65 KG/M2 | DIASTOLIC BLOOD PRESSURE: 79 MMHG | TEMPERATURE: 98.4 F | OXYGEN SATURATION: 96 % | HEART RATE: 81 BPM

## 2021-07-13 LAB
GLUCOSE BLD STRIP.AUTO-MCNC: 143 MG/DL (ref 65–100)
SERVICE CMNT-IMP: ABNORMAL

## 2021-07-13 PROCEDURE — 74011000250 HC RX REV CODE- 250: Performed by: NURSE ANESTHETIST, CERTIFIED REGISTERED

## 2021-07-13 PROCEDURE — 88173 CYTOPATH EVAL FNA REPORT: CPT

## 2021-07-13 PROCEDURE — 77030008969: Performed by: INTERNAL MEDICINE

## 2021-07-13 PROCEDURE — 88305 TISSUE EXAM BY PATHOLOGIST: CPT

## 2021-07-13 PROCEDURE — 77030021593 HC FCPS BIOP ENDOSC BSC -A: Performed by: INTERNAL MEDICINE

## 2021-07-13 PROCEDURE — 77030028690 HC NDL ASPIR ULTRSND BSC -E: Performed by: INTERNAL MEDICINE

## 2021-07-13 PROCEDURE — 88312 SPECIAL STAINS GROUP 1: CPT

## 2021-07-13 PROCEDURE — 74011250636 HC RX REV CODE- 250/636: Performed by: ANESTHESIOLOGY

## 2021-07-13 PROCEDURE — 82962 GLUCOSE BLOOD TEST: CPT

## 2021-07-13 PROCEDURE — 74011250636 HC RX REV CODE- 250/636: Performed by: NURSE ANESTHETIST, CERTIFIED REGISTERED

## 2021-07-13 PROCEDURE — 82378 CARCINOEMBRYONIC ANTIGEN: CPT

## 2021-07-13 PROCEDURE — 2709999900 HC NON-CHARGEABLE SUPPLY: Performed by: INTERNAL MEDICINE

## 2021-07-13 PROCEDURE — 76040000025: Performed by: INTERNAL MEDICINE

## 2021-07-13 PROCEDURE — 82150 ASSAY OF AMYLASE: CPT

## 2021-07-13 PROCEDURE — 76060000032 HC ANESTHESIA 0.5 TO 1 HR: Performed by: INTERNAL MEDICINE

## 2021-07-13 RX ORDER — LIDOCAINE HYDROCHLORIDE 20 MG/ML
INJECTION, SOLUTION EPIDURAL; INFILTRATION; INTRACAUDAL; PERINEURAL AS NEEDED
Status: DISCONTINUED | OUTPATIENT
Start: 2021-07-13 | End: 2021-07-13 | Stop reason: HOSPADM

## 2021-07-13 RX ORDER — LEVOFLOXACIN 5 MG/ML
500 INJECTION, SOLUTION INTRAVENOUS ONCE
Status: DISCONTINUED | OUTPATIENT
Start: 2021-07-13 | End: 2021-07-13 | Stop reason: HOSPADM

## 2021-07-13 RX ORDER — PROPOFOL 10 MG/ML
INJECTION, EMULSION INTRAVENOUS AS NEEDED
Status: DISCONTINUED | OUTPATIENT
Start: 2021-07-13 | End: 2021-07-13 | Stop reason: HOSPADM

## 2021-07-13 RX ORDER — SODIUM CHLORIDE 9 MG/ML
1000 INJECTION, SOLUTION INTRAVENOUS CONTINUOUS
Status: DISCONTINUED | OUTPATIENT
Start: 2021-07-13 | End: 2021-07-13 | Stop reason: HOSPADM

## 2021-07-13 RX ORDER — PROPOFOL 10 MG/ML
INJECTION, EMULSION INTRAVENOUS
Status: DISCONTINUED | OUTPATIENT
Start: 2021-07-13 | End: 2021-07-13 | Stop reason: HOSPADM

## 2021-07-13 RX ADMIN — PROPOFOL 50 MG: 10 INJECTION, EMULSION INTRAVENOUS at 14:10

## 2021-07-13 RX ADMIN — LIDOCAINE HYDROCHLORIDE 50 MG: 20 INJECTION, SOLUTION EPIDURAL; INFILTRATION; INTRACAUDAL; PERINEURAL at 14:10

## 2021-07-13 RX ADMIN — PROPOFOL 160 MCG/KG/MIN: 10 INJECTION, EMULSION INTRAVENOUS at 14:10

## 2021-07-13 RX ADMIN — SODIUM CHLORIDE 1000 ML: 900 INJECTION, SOLUTION INTRAVENOUS at 12:52

## 2021-07-13 NOTE — ANESTHESIA POSTPROCEDURE EVALUATION
Procedure(s):  ENDOSCOPIC ULTRASOUND (EUS)/BMI 40  ESOPHAGOGASTRODUODENAL (EGD) BIOPSY  FINE NEEDLE ASPIRATION. total IV anesthesia    Anesthesia Post Evaluation        Patient location during evaluation: PACU  Patient participation: complete - patient participated  Level of consciousness: awake and alert  Pain management: adequate  Airway patency: patent  Anesthetic complications: no  Cardiovascular status: acceptable  Respiratory status: acceptable  Hydration status: acceptable  Post anesthesia nausea and vomiting:  none      INITIAL Post-op Vital signs:   Vitals Value Taken Time   /79 07/13/21 1512   Temp     Pulse 79 07/13/21 1516   Resp 13 07/13/21 1500   SpO2 95 % 07/13/21 1516   Vitals shown include unvalidated device data.
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
## 2021-07-13 NOTE — ROUTINE PROCESS
Patient discharge home at this time via wheelchair with RN. Vital signs stable on room air. No complaints of pain or discomfort. Peripheral IV removed with cath tip intact (dressing dry/intact). Discharge instructions provided to family and responsible party. MD spoke with family at bedside. All questions answered at this time.

## 2021-07-13 NOTE — PROCEDURES
ENDOSCOPIC ULTRASOUND PROCEDURE NOTE    DATE OF PROCEDURE: 7/13/2021    PRE-OP DIAGNOSIS:  Pancreatic cysts  Abnormal CT    POST-OP DIAGNOSIS:  Pancreatic cyst uncinate- possible BD-IPMN vs pseudocyst  Pancreatic cyst body- Probable serous cystadenoma  Moderate gastritis w/ erosions    MEDICATIONS ADMINISTERED: MAC    INSTRUMENT: GFUC T180    PROCEDURE:  After obtaining informed consent, the patient was placed in the left lateral position and sedated. The echoendoscope was advanced to the upper GI tract without difficulty. The scope was slowly removed while detailed images of the adjacent organs were obtained. The patient was taken to the recovery area in stable condition. FINDINGS:  ESOPHAGUS: Not well visualized  STOMACH: Limited views with the oblique-viewing echoendoscope demonstrated moderate diffuse gastritis w/ erythema, friability, antral erosions and a shallow ulcer. DUODENUM: Limited views with the oblique-viewing echoendoscope were unremarkable. PANCREAS: The pancreas was well visualized from the head to the tail. The ampulla appears normal endosonographically. Pancreas divisum is not the main pancreatic duct is 3.5 mm in the head, 1-2 mm in the body and tail. There is a complex cystic lesion in the pancreatic body measuring 34 x 44 mm. This has an appearance suggestive of a serous cystadenoma. This lesion was previously biopsied and benign. There is also a cystic lesion in the head and uncinate. The larger component measures 31 x 21 mm. A smaller adjacent cyst is 10 mm in diameter. It has smooth thin walls with no mural nodule or internal debris. No internal septations. FNA was performed of the larger lesion with a 22g B-S needle. 1 pass from trans-duodenal approach and 5 cc thin clear yellow fluid was aspirated and sent for CA amylase cytology levels. The cyst completely collapsed. Prophylactic antibiotics were given. There were no other cysts or masses present.    BILIARY SYSTEM: The gallbladder is normal without stones. He common bile duct is 5 mm in maximum diameter with smooth tapering distally. OTHER ORGANS: There was no ascites in the upper abdomen. Limited views of the left lobe of the liver demonstrated no solid mass lesions. The celiac axis appears normal.  No significant celiac or vilma-pancreatic lymphadenopathy. Estimated blood loss: 0-minimal     PLAN:  Follow up cyst aspiration results. No aspirin or NSAIDs for 48 hours. Follow up pathology from gastric biopsies. Consider PPI therapy. Avoid NSAIDs.  Office follow up 2-3 months     Lupe Whittaker MD  [facility], [place]

## 2021-07-13 NOTE — PROGRESS NOTES
's pre-procedure visit and prayer with patient as requested.     Carmelita Gamino MDiv, BS  Board Certified

## 2021-07-13 NOTE — DISCHARGE INSTRUCTIONS
Gastrointestinal Esophagogastroduodenoscopy (EGD)/EUS - Upper Exam Discharge Instructions    1. Call Dr. Jm Cardona for any problems or questions. 2. Contact the doctor's office for follow up appointment as directed. 3. Medication may cause drowsiness for several hours, therefore:  · Do not drive or operate machinery for remainder of the day. · No alcohol today. · Do not make any important or legal decisions for 24 hours. · Do not sign any legal documents for 24 hours. 5. Ordinarily, you may resume regular diet and activity after exam unless otherwise              specified by your physician. 6. For mild soreness in your throat you may use Cepacol throat lozenges or warm               salt-water gargles as needed. Antibiotic given today, start tomorrow  Await pathology results  Return to the office in 3-4 months      Instructions given to Renee Parent and other family members.

## 2021-07-13 NOTE — ANESTHESIA PREPROCEDURE EVALUATION
Relevant Problems   No relevant active problems       Anesthetic History   No history of anesthetic complications            Review of Systems / Medical History  Patient summary reviewed and pertinent labs reviewed    Pulmonary        Sleep apnea (s/p uppp):  No treatment           Neuro/Psych   Within defined limits           Cardiovascular    Hypertension: well controlled              Exercise tolerance: >4 METS     GI/Hepatic/Renal         Renal disease ([place]): CRI and stones       Endo/Other    Diabetes: poorly controlled, type 2, using insulin  Hypothyroidism  Obesity     Other Findings              Physical Exam    Airway  Mallampati: III  TM Distance: < 4 cm  Neck ROM: normal range of motion   Mouth opening: Diminished (comment)     Cardiovascular    Rhythm: regular  Rate: normal         Dental  No notable dental hx       Pulmonary  Breath sounds clear to auscultation               Abdominal         Other Findings            Anesthetic Plan    ASA: 3  Anesthesia type: total IV anesthesia          Induction: Intravenous  Anesthetic plan and risks discussed with: Patient and Spouse

## 2021-07-13 NOTE — PROGRESS NOTES
Patient receiving IV Levaquin post procedure. Will continue to monitor. Patient will D/C home when complete.

## 2021-07-18 LAB
Lab: NORMAL
REFERENCE LAB,REFLB: NORMAL
TEST DESCRIPTION:,ATST: NORMAL

## 2021-08-09 ENCOUNTER — ANESTHESIA EVENT (OUTPATIENT)
Dept: SURGERY | Age: 66
End: 2021-08-09
Payer: MEDICARE

## 2021-08-10 ENCOUNTER — ANESTHESIA (OUTPATIENT)
Dept: SURGERY | Age: 66
End: 2021-08-10
Payer: MEDICARE

## 2021-08-10 ENCOUNTER — HOSPITAL ENCOUNTER (OUTPATIENT)
Age: 66
Setting detail: OUTPATIENT SURGERY
Discharge: HOME | End: 2021-08-10
Attending: UROLOGY | Admitting: UROLOGY
Payer: MEDICARE

## 2021-08-10 VITALS
WEIGHT: 274.8 LBS | HEART RATE: 85 BPM | BODY MASS INDEX: 40.7 KG/M2 | TEMPERATURE: 98.2 F | SYSTOLIC BLOOD PRESSURE: 103 MMHG | RESPIRATION RATE: 18 BRPM | OXYGEN SATURATION: 97 % | HEIGHT: 69 IN | DIASTOLIC BLOOD PRESSURE: 58 MMHG

## 2021-08-10 DIAGNOSIS — N20.0 KIDNEY STONE: Primary | ICD-10-CM

## 2021-08-10 LAB
ANION GAP SERPL CALC-SCNC: 8 MMOL/L (ref 7–16)
APPEARANCE UR: CLEAR
BACTERIA URNS QL MICRO: 0 /HPF
BILIRUB UR QL: NEGATIVE
BUN SERPL-MCNC: 24 MG/DL (ref 8–23)
CALCIUM SERPL-MCNC: 8.8 MG/DL (ref 8.3–10.4)
CASTS URNS QL MICRO: 0 /LPF
CHLORIDE SERPL-SCNC: 106 MMOL/L (ref 98–107)
CO2 SERPL-SCNC: 27 MMOL/L (ref 21–32)
COLOR UR: YELLOW
CREAT SERPL-MCNC: 1.37 MG/DL (ref 0.8–1.5)
EPI CELLS #/AREA URNS HPF: 0 /HPF
ERYTHROCYTE [DISTWIDTH] IN BLOOD BY AUTOMATED COUNT: 18.2 % (ref 11.9–14.6)
GLUCOSE BLD STRIP.AUTO-MCNC: 159 MG/DL (ref 65–100)
GLUCOSE SERPL-MCNC: 153 MG/DL (ref 65–100)
GLUCOSE UR STRIP.AUTO-MCNC: NEGATIVE MG/DL
HCT VFR BLD AUTO: 36.2 % (ref 41.1–50.3)
HGB BLD-MCNC: 11.1 G/DL (ref 13.6–17.2)
HGB UR QL STRIP: NEGATIVE
KETONES UR QL STRIP.AUTO: NEGATIVE MG/DL
LEUKOCYTE ESTERASE UR QL STRIP.AUTO: NEGATIVE
MCH RBC QN AUTO: 19.9 PG (ref 26.1–32.9)
MCHC RBC AUTO-ENTMCNC: 30.7 G/DL (ref 31.4–35)
MCV RBC AUTO: 64.9 FL (ref 79.6–97.8)
NITRITE UR QL STRIP.AUTO: NEGATIVE
NRBC # BLD: 0 K/UL (ref 0–0.2)
PH UR STRIP: 6 [PH] (ref 5–9)
PLATELET # BLD AUTO: 201 K/UL (ref 150–450)
PMV BLD AUTO: 10.2 FL (ref 9.4–12.3)
POTASSIUM BLD-SCNC: 4.2 MMOL/L (ref 3.5–5.1)
POTASSIUM SERPL-SCNC: 4.2 MMOL/L (ref 3.5–5.1)
PROT UR STRIP-MCNC: ABNORMAL MG/DL
RBC # BLD AUTO: 5.58 M/UL (ref 4.23–5.6)
RBC #/AREA URNS HPF: 0 /HPF
SERVICE CMNT-IMP: ABNORMAL
SODIUM SERPL-SCNC: 141 MMOL/L (ref 136–145)
SP GR UR REFRACTOMETRY: 1.02 (ref 1–1.02)
UROBILINOGEN UR QL STRIP.AUTO: 1 EU/DL (ref 0.2–1)
WBC # BLD AUTO: 8.9 K/UL (ref 4.3–11.1)
WBC URNS QL MICRO: ABNORMAL /HPF

## 2021-08-10 PROCEDURE — 76210000006 HC OR PH I REC 0.5 TO 1 HR: Performed by: UROLOGY

## 2021-08-10 PROCEDURE — 74011250636 HC RX REV CODE- 250/636: Performed by: UROLOGY

## 2021-08-10 PROCEDURE — 52332 CYSTOSCOPY AND TREATMENT: CPT | Performed by: UROLOGY

## 2021-08-10 PROCEDURE — 77030037088 HC TUBE ENDOTRACH ORAL NSL COVD-A: Performed by: ANESTHESIOLOGY

## 2021-08-10 PROCEDURE — 74011250636 HC RX REV CODE- 250/636: Performed by: ANESTHESIOLOGY

## 2021-08-10 PROCEDURE — 76210000021 HC REC RM PH II 0.5 TO 1 HR: Performed by: UROLOGY

## 2021-08-10 PROCEDURE — 76010000149 HC OR TIME 1 TO 1.5 HR: Performed by: UROLOGY

## 2021-08-10 PROCEDURE — 77030019927 HC TBNG IRR CYSTO BAXT -A: Performed by: UROLOGY

## 2021-08-10 PROCEDURE — 50590 FRAGMENTING OF KIDNEY STONE: CPT | Performed by: UROLOGY

## 2021-08-10 PROCEDURE — 81003 URINALYSIS AUTO W/O SCOPE: CPT

## 2021-08-10 PROCEDURE — 77030040922 HC BLNKT HYPOTHRM STRY -A: Performed by: ANESTHESIOLOGY

## 2021-08-10 PROCEDURE — 77030039425 HC BLD LARYNG TRULITE DISP TELE -A: Performed by: ANESTHESIOLOGY

## 2021-08-10 PROCEDURE — 74011000250 HC RX REV CODE- 250: Performed by: NURSE ANESTHETIST, CERTIFIED REGISTERED

## 2021-08-10 PROCEDURE — 85027 COMPLETE CBC AUTOMATED: CPT

## 2021-08-10 PROCEDURE — 80048 BASIC METABOLIC PNL TOTAL CA: CPT

## 2021-08-10 PROCEDURE — 74011250637 HC RX REV CODE- 250/637: Performed by: ANESTHESIOLOGY

## 2021-08-10 PROCEDURE — 76060000033 HC ANESTHESIA 1 TO 1.5 HR: Performed by: UROLOGY

## 2021-08-10 PROCEDURE — 77030019908 HC STETH ESOPH SIMS -A: Performed by: ANESTHESIOLOGY

## 2021-08-10 PROCEDURE — 84132 ASSAY OF SERUM POTASSIUM: CPT

## 2021-08-10 PROCEDURE — C2617 STENT, NON-COR, TEM W/O DEL: HCPCS | Performed by: UROLOGY

## 2021-08-10 PROCEDURE — 74011250636 HC RX REV CODE- 250/636: Performed by: NURSE ANESTHETIST, CERTIFIED REGISTERED

## 2021-08-10 PROCEDURE — 82962 GLUCOSE BLOOD TEST: CPT

## 2021-08-10 PROCEDURE — 77030040361 HC SLV COMPR DVT MDII -B: Performed by: UROLOGY

## 2021-08-10 PROCEDURE — C1769 GUIDE WIRE: HCPCS | Performed by: UROLOGY

## 2021-08-10 PROCEDURE — 2709999900 HC NON-CHARGEABLE SUPPLY: Performed by: UROLOGY

## 2021-08-10 DEVICE — URETERAL STENT
Type: IMPLANTABLE DEVICE | Site: URETER | Status: FUNCTIONAL
Brand: PERCUFLEX™ PLUS

## 2021-08-10 RX ORDER — OXYCODONE AND ACETAMINOPHEN 10; 325 MG/1; MG/1
1 TABLET ORAL AS NEEDED
Status: DISCONTINUED | OUTPATIENT
Start: 2021-08-10 | End: 2021-08-10 | Stop reason: HOSPADM

## 2021-08-10 RX ORDER — LIDOCAINE HYDROCHLORIDE 20 MG/ML
INJECTION, SOLUTION EPIDURAL; INFILTRATION; INTRACAUDAL; PERINEURAL AS NEEDED
Status: DISCONTINUED | OUTPATIENT
Start: 2021-08-10 | End: 2021-08-10 | Stop reason: HOSPADM

## 2021-08-10 RX ORDER — SODIUM CHLORIDE 0.9 % (FLUSH) 0.9 %
5-40 SYRINGE (ML) INJECTION EVERY 8 HOURS
Status: DISCONTINUED | OUTPATIENT
Start: 2021-08-10 | End: 2021-08-10 | Stop reason: HOSPADM

## 2021-08-10 RX ORDER — SODIUM CHLORIDE, SODIUM LACTATE, POTASSIUM CHLORIDE, CALCIUM CHLORIDE 600; 310; 30; 20 MG/100ML; MG/100ML; MG/100ML; MG/100ML
75 INJECTION, SOLUTION INTRAVENOUS CONTINUOUS
Status: DISCONTINUED | OUTPATIENT
Start: 2021-08-10 | End: 2021-08-10 | Stop reason: HOSPADM

## 2021-08-10 RX ORDER — FENTANYL CITRATE 50 UG/ML
INJECTION, SOLUTION INTRAMUSCULAR; INTRAVENOUS AS NEEDED
Status: DISCONTINUED | OUTPATIENT
Start: 2021-08-10 | End: 2021-08-10 | Stop reason: HOSPADM

## 2021-08-10 RX ORDER — ACETAMINOPHEN 500 MG
1000 TABLET ORAL ONCE
Status: COMPLETED | OUTPATIENT
Start: 2021-08-10 | End: 2021-08-10

## 2021-08-10 RX ORDER — HYDROMORPHONE HYDROCHLORIDE 2 MG/ML
0.5 INJECTION, SOLUTION INTRAMUSCULAR; INTRAVENOUS; SUBCUTANEOUS
Status: DISCONTINUED | OUTPATIENT
Start: 2021-08-10 | End: 2021-08-10 | Stop reason: HOSPADM

## 2021-08-10 RX ORDER — DEXAMETHASONE SODIUM PHOSPHATE 100 MG/10ML
INJECTION INTRAMUSCULAR; INTRAVENOUS AS NEEDED
Status: DISCONTINUED | OUTPATIENT
Start: 2021-08-10 | End: 2021-08-10 | Stop reason: HOSPADM

## 2021-08-10 RX ORDER — HYDROCODONE BITARTRATE AND ACETAMINOPHEN 5; 325 MG/1; MG/1
1 TABLET ORAL
Qty: 15 TABLET | Refills: 0 | Status: SHIPPED | OUTPATIENT
Start: 2021-08-10 | End: 2021-08-15

## 2021-08-10 RX ORDER — EPHEDRINE SULFATE/0.9% NACL/PF 50 MG/5 ML
SYRINGE (ML) INTRAVENOUS AS NEEDED
Status: DISCONTINUED | OUTPATIENT
Start: 2021-08-10 | End: 2021-08-10 | Stop reason: HOSPADM

## 2021-08-10 RX ORDER — ROCURONIUM BROMIDE 10 MG/ML
INJECTION, SOLUTION INTRAVENOUS AS NEEDED
Status: DISCONTINUED | OUTPATIENT
Start: 2021-08-10 | End: 2021-08-10 | Stop reason: HOSPADM

## 2021-08-10 RX ORDER — PROPOFOL 10 MG/ML
INJECTION, EMULSION INTRAVENOUS AS NEEDED
Status: DISCONTINUED | OUTPATIENT
Start: 2021-08-10 | End: 2021-08-10 | Stop reason: HOSPADM

## 2021-08-10 RX ORDER — MIDAZOLAM HYDROCHLORIDE 1 MG/ML
2 INJECTION, SOLUTION INTRAMUSCULAR; INTRAVENOUS
Status: COMPLETED | OUTPATIENT
Start: 2021-08-10 | End: 2021-08-10

## 2021-08-10 RX ORDER — ONDANSETRON 2 MG/ML
INJECTION INTRAMUSCULAR; INTRAVENOUS AS NEEDED
Status: DISCONTINUED | OUTPATIENT
Start: 2021-08-10 | End: 2021-08-10 | Stop reason: HOSPADM

## 2021-08-10 RX ORDER — SUCCINYLCHOLINE CHLORIDE 20 MG/ML
INJECTION INTRAMUSCULAR; INTRAVENOUS AS NEEDED
Status: DISCONTINUED | OUTPATIENT
Start: 2021-08-10 | End: 2021-08-10 | Stop reason: HOSPADM

## 2021-08-10 RX ORDER — GLYCOPYRROLATE 0.2 MG/ML
INJECTION INTRAMUSCULAR; INTRAVENOUS AS NEEDED
Status: DISCONTINUED | OUTPATIENT
Start: 2021-08-10 | End: 2021-08-10 | Stop reason: HOSPADM

## 2021-08-10 RX ORDER — SODIUM CHLORIDE 0.9 % (FLUSH) 0.9 %
5-40 SYRINGE (ML) INJECTION AS NEEDED
Status: DISCONTINUED | OUTPATIENT
Start: 2021-08-10 | End: 2021-08-10 | Stop reason: HOSPADM

## 2021-08-10 RX ORDER — OXYCODONE HYDROCHLORIDE 5 MG/1
5 TABLET ORAL
Status: DISCONTINUED | OUTPATIENT
Start: 2021-08-10 | End: 2021-08-10 | Stop reason: HOSPADM

## 2021-08-10 RX ORDER — NEOSTIGMINE METHYLSULFATE 1 MG/ML
INJECTION, SOLUTION INTRAVENOUS AS NEEDED
Status: DISCONTINUED | OUTPATIENT
Start: 2021-08-10 | End: 2021-08-10 | Stop reason: HOSPADM

## 2021-08-10 RX ADMIN — PHENYLEPHRINE HYDROCHLORIDE 120 MCG: 10 INJECTION INTRAVENOUS at 14:25

## 2021-08-10 RX ADMIN — DEXAMETHASONE SODIUM PHOSPHATE 10 MG: 10 INJECTION INTRAMUSCULAR; INTRAVENOUS at 14:13

## 2021-08-10 RX ADMIN — CEFAZOLIN 3 G: 1 INJECTION, POWDER, FOR SOLUTION INTRAVENOUS at 14:00

## 2021-08-10 RX ADMIN — Medication 10 MG: at 14:33

## 2021-08-10 RX ADMIN — PHENYLEPHRINE HYDROCHLORIDE 240 MCG: 10 INJECTION INTRAVENOUS at 14:45

## 2021-08-10 RX ADMIN — GLYCOPYRROLATE 0.8 MG: 0.2 INJECTION, SOLUTION INTRAMUSCULAR; INTRAVENOUS at 14:57

## 2021-08-10 RX ADMIN — LIDOCAINE HYDROCHLORIDE 100 MG: 20 INJECTION, SOLUTION EPIDURAL; INFILTRATION; INTRACAUDAL; PERINEURAL at 13:54

## 2021-08-10 RX ADMIN — PHENYLEPHRINE HYDROCHLORIDE 120 MCG: 10 INJECTION INTRAVENOUS at 14:35

## 2021-08-10 RX ADMIN — FENTANYL CITRATE 100 MCG: 50 INJECTION INTRAMUSCULAR; INTRAVENOUS at 13:50

## 2021-08-10 RX ADMIN — ROCURONIUM BROMIDE 20 MG: 10 INJECTION, SOLUTION INTRAVENOUS at 14:03

## 2021-08-10 RX ADMIN — ONDANSETRON 4 MG: 2 INJECTION INTRAMUSCULAR; INTRAVENOUS at 14:54

## 2021-08-10 RX ADMIN — PHENYLEPHRINE HYDROCHLORIDE 120 MCG: 10 INJECTION INTRAVENOUS at 14:26

## 2021-08-10 RX ADMIN — PHENYLEPHRINE HYDROCHLORIDE 120 MCG: 10 INJECTION INTRAVENOUS at 14:08

## 2021-08-10 RX ADMIN — MIDAZOLAM 2 MG: 1 INJECTION INTRAMUSCULAR; INTRAVENOUS at 13:50

## 2021-08-10 RX ADMIN — PROPOFOL 200 MG: 10 INJECTION, EMULSION INTRAVENOUS at 13:54

## 2021-08-10 RX ADMIN — SODIUM CHLORIDE, SODIUM LACTATE, POTASSIUM CHLORIDE, AND CALCIUM CHLORIDE 75 ML/HR: 600; 310; 30; 20 INJECTION, SOLUTION INTRAVENOUS at 11:52

## 2021-08-10 RX ADMIN — Medication 5 MG: at 14:57

## 2021-08-10 RX ADMIN — PHENYLEPHRINE HYDROCHLORIDE 120 MCG: 10 INJECTION INTRAVENOUS at 14:15

## 2021-08-10 RX ADMIN — ACETAMINOPHEN 1000 MG: 500 TABLET ORAL at 11:47

## 2021-08-10 RX ADMIN — SUCCINYLCHOLINE CHLORIDE 140 MG: 20 INJECTION, SOLUTION INTRAMUSCULAR; INTRAVENOUS at 13:54

## 2021-08-10 RX ADMIN — ROCURONIUM BROMIDE 10 MG: 10 INJECTION, SOLUTION INTRAVENOUS at 13:54

## 2021-08-10 NOTE — DISCHARGE INSTRUCTIONS
Lithotripsy does not make your stone disappear. The treatment is meant to crush your stone so that the fragments can be passed. Strain your urine, save any fragments, and take them to your doctor. The fragments may not begin to pass for up to 1-2 months. You may experience slight bruising at the treated site. Ureteral Stent Placement: What to Expect at [address] [place] [address]  A ureteral (say \"you-REE-ter-ul\") stent is a thin, hollow tube that is placed in the ureter to help urine pass from the kidney into the bladder. Ureters are the tubes that connect the kidneys to the bladder. You may have a small amount of blood in your urine for 1 to 3 days after the procedure. While the stent is in place, you may have to urinate more often, feel a sudden need to urinate, or feel like you cannot completely empty your bladder. You may feel some pain when you urinate or do strenuous activity. You also may notice a small amount of blood in your urine after strenuous activities. These side effects usually do not prevent people from doing their normal daily activities. You may have a string attached to your stent. Do not to pull the string unless the doctor tells you to. The doctor will use the string to pull out the stent when you no longer need it. After the procedure, urine may flow better from your kidneys to your bladder. A ureteral stent may be left in place for several days or for as long as several months. Your doctor will take it out when you no longer need it. Cystoscopy: What to Expect at [address] [place] [address]  A cystoscopy is a procedure that lets a doctor look inside of the bladder and the urethra. The urethra is the tube that carries urine from the bladder to outside the body. The doctor uses a thin, lighted tube called a cystoscope to look for kidney or bladder stones, tumors, bleeding, or infection.  After the cystoscopy, your urethra may be sore at first, and it may burn when you urinate for the first few days after the procedure. You may feel the need to urinate more often, and your urine may be pink. These symptoms should get better in 1 or 2 days. You will probably be able to go back to work or most of your usual activities in 1 or 2 days. How can you care for yourself at home? Activity  · Rest when you feel tired. Getting enough sleep will help you recover. · Try to walk each day. Start by walking a little more than you did the day before. Bit by bit, increase the amount you walk. Walking boosts blood flow and helps prevent pneumonia and constipation. · Avoid strenuous activities, such as bicycle riding, jogging, weight lifting, or aerobic exercise, until your doctor says it is okay. · Ask your doctor when you can drive again. · Most people are able to return to work within 1 or 2 days after the procedure. · You may shower and take baths as usual.  · Ask your doctor when it is okay for you to have sex. Diet  · You can eat your normal diet. If your stomach is upset, try bland, low-fat foods like plain rice, broiled chicken, toast, and yogurt. · Drink plenty of fluids (unless your doctor tells you not to). Medicines  · Take pain medicines exactly as directed. ¨ If the doctor gave you a prescription medicine for pain, take it as prescribed. ¨ If you are not taking a prescription pain medicine, ask your doctor if you can take an over-the-counter medicine. · If you think your pain medicine is making you sick to your stomach:  ¨ Take your medicine after meals (unless your doctor has told you not to). ¨ Ask your doctor for a different pain medicine. · If your doctor prescribed antibiotics, take them as directed. Do not stop taking them just because you feel better. You need to take the full course of antibiotics. Medication Interaction:  During your procedure you potentially received a medication or medications which may reduce the effectiveness of oral contraceptives.  Please consider other forms of contraception for 1 month following your procedure if you are currently using oral contraceptives as your primary form of birth control. In addition to this, we recommend continuing your oral contraceptive as prescribed, unless otherwise instructed by your physician, during this time. Follow-up care is a key part of your treatment and safety. Be sure to make and go to all appointments, and call your doctor if you are having problems. It's also a good idea to know your test results and keep a list of the medicines you take. When should you call for help? Call 911 anytime you think you may need emergency care. For example, call if:  · You passed out (lost consciousness). · You have severe trouble breathing. · You have sudden chest pain and shortness of breath, or you cough up blood. · You have severe belly pain. Call your doctor now or seek immediate medical care if:  · You are sick to your stomach or cannot keep fluids down. · Your urine is still red or you see blood clots after you have urinated several times. · You have trouble passing urine or stool, especially if you have pain or swelling in your lower belly. · You have signs of a blood clot, such as:  ¨ Pain in your calf, back of the knee, thigh, or groin. ¨ Redness and swelling in your leg or groin. · You develop a fever or severe chills. · You have pain in your back just below your rib cage. This is called flank pain. Watch closely for changes in your health, and be sure to contact your doctor if:  · A burning feeling is normal for a day or two after the test, but call if it does not get better. · You have a frequent urge to urinate but can pass only small amounts of urine. · It is normal for the urine to have a pinkish color for a few days after the test, but call if it does not get better or if your urine is cloudy, smells bad, or has pus in it.     After general anesthesia or intravenous sedation, for 24 hours or while taking prescription Narcotics:  · Limit your activities  · A responsible adult needs to be with you for the next 24 hours  · Do not drive and operate hazardous machinery  · Do not make important personal or business decisions  · Do not drink alcoholic beverages  · If you have not urinated within 8 hours after discharge, and you are experiencing discomfort from urinary retention, please go to the nearest ED. · If you have sleep apnea and have a CPAP machine, please use it for all naps and sleeping. · Please use caution when taking narcotics and any of your home medications that may cause drowsiness. *  Please give a list of your current medications to your Primary Care Provider. *  Please update this list whenever your medications are discontinued, doses are      changed, or new medications (including over-the-counter products) are added. *  Please carry medication information at all times in case of emergency situations. These are general instructions for a healthy lifestyle:  No smoking/ No tobacco products/ Avoid exposure to second hand smoke  Surgeon General's Warning:  Quitting smoking now greatly reduces serious risk to your health. Obesity, smoking, and sedentary lifestyle greatly increases your risk for illness  A healthy diet, regular physical exercise & weight monitoring are important for maintaining a healthy lifestyle    You may be retaining fluid if you have a history of heart failure or if you experience any of the following symptoms:  Weight gain of 3 pounds or more overnight or 5 pounds in a week, increased swelling in our hands or feet or shortness of breath while lying flat in bed. Please call your doctor as soon as you notice any of these symptoms; do not wait until your next office visit.

## 2021-08-10 NOTE — BRIEF OP NOTE
Brief Postoperative Note    Patient: Kathryn Dodge  YOB: 1955  MRN: [id number]    Date of Procedure: 8/10/2021     Pre-Op Diagnosis: Kidney stone [N20.0]    Post-Op Diagnosis: Same as preoperative diagnosis. Procedure(s):  CYSTOSCOPY RIGHT STENT PLACEMENT  RIGHT LITHOTRIPSY EXTRACORPOREAL SHOCKWAVE ESWL    Surgeon(s):  Tara Solorzano MD    Surgical Assistant: None    Anesthesia: General     Estimated Blood Loss (mL): Minimal    Complications: None    Specimens: * No specimens in log *     Implants:   Implant Name Type Inv.  Item Serial No.  Lot No. LRB No. Used Action   STENT URET 7FR L26CM PERCFLX + HYDR+ FIRM DUROMETER DBL - [id number]  STENT URET 7FR L26CM PERCFLX + HYDR+ FIRM DUROMETER DBL  [employer]_ [id number] 1 Implanted       Drains: * No LDAs found *    Findings: see op note    Electronically Signed by Maite Sheehan MD on 8/10/2021 at 3:13 PM

## 2021-08-10 NOTE — ANESTHESIA PREPROCEDURE EVALUATION
Anesthetic History               Review of Systems / Medical History  Patient summary reviewed and pertinent labs reviewed    Pulmonary        Sleep apnea        Comments: S/p UPP- no treatment now   Neuro/Psych   Within defined limits           Cardiovascular    Hypertension              Exercise tolerance: >4 METS     GI/Hepatic/Renal     GERD    Renal disease: CRI and stones  Liver disease    Comments: S/p nephrectomy for renal cell?  Ca  Hepatic steatosis  Hx of pancreatic mass Endo/Other    Diabetes: type 2, using insulin  Hypothyroidism  Morbid obesity    Comments: S/p thyroidectomy for thyroid ca Other Findings            Physical Exam    Airway  Mallampati: II  TM Distance: > 6 cm  Neck ROM: normal range of motion   Mouth opening: Normal     Cardiovascular    Rhythm: regular           Dental    Dentition: Caps/crowns     Pulmonary                 Abdominal  GI exam deferred       Other Findings            Anesthetic Plan    ASA: 3  Anesthesia type: general          Induction: Intravenous  Anesthetic plan and risks discussed with: Patient
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
## 2021-08-10 NOTE — PERIOP NOTES
Discharge instructions given to patient's wife, Presley, at bedside. Verbalized understanding. No questions at this time.

## 2021-08-10 NOTE — ANESTHESIA POSTPROCEDURE EVALUATION
Procedure(s):  CYSTOSCOPY RIGHT STENT PLACEMENT  RIGHT LITHOTRIPSY EXTRACORPOREAL SHOCKWAVE ESWL. general    Anesthesia Post Evaluation      Multimodal analgesia: multimodal analgesia used between 6 hours prior to anesthesia start to PACU discharge  Patient location during evaluation: PACU  Patient participation: complete - patient participated  Level of consciousness: awake and alert  Pain management: adequate  Airway patency: patent  Anesthetic complications: no  Cardiovascular status: acceptable  Respiratory status: acceptable  Hydration status: acceptable  Post anesthesia nausea and vomiting:  none  Final Post Anesthesia Temperature Assessment:  Normothermia (36.0-37.5 degrees C)      INITIAL Post-op Vital signs:   Vitals Value Taken Time   /62 08/10/21 1608   Temp 36.7 °C (98 °F) 08/10/21 1517   Pulse 82 08/10/21 1610   Resp 18 08/10/21 1514   SpO2 98 % 08/10/21 1610   Vitals shown include unvalidated device data.

## 2021-08-10 NOTE — PROGRESS NOTES
Spiritual Care visit. Initial Visit, Pre Surgery Consult. Visit and prayer before patient goes to surgery.     Visit by Nilton Ayers M.Ed., Th.B. ,Staff

## 2021-08-10 NOTE — PERIOP NOTES
Kalee Tran Preoperative flank skin condition: Clear  Lead shield: Yes  Patient ear protection: Yes   Gel applied to patient's flank and Lithotripsy head: Yes   Starting power level: 7  Shock start time (first  fluoroscopy): 1423  Shock stop time (last lithotripsy shock): 1457  Ending power level: 11  Total shocks: 3000  Total fluoroscopy time: 3:50  Postoperative flank skin condition: Clear

## 2021-08-11 NOTE — OP NOTES
[facility]  OPERATIVE REPORT    Name:  Kyaw Trotter  MR#:  [id number]  :  1955  ACCOUNT #:  [de-identified]  DATE OF SERVICE:  08/10/2021    PREOPERATIVE DIAGNOSIS:  Right renal calculus with secondary diagnosis of solitary right kidney. POSTOPERATIVE DIAGNOSIS:  Right renal calculus with secondary diagnosis of solitary right kidney. PROCEDURE PERFORMED:  Cystoscopy, right ureteral stent placement and right renal extracorporeal shock wave lithotripsy. SURGEON:  Carlos Neri. Gorge Monteiro MD    ASSISTANT:  None. ANESTHESIA:  General.    COMPLICATIONS:  None. SPECIMENS REMOVED:  None. IMPLANTS:  Right ureteral stent. ESTIMATED BLOOD LOSS:  None. FINDINGS:  An 8 mm ovoid stone in the right renal pelvis. DESCRIPTION OF PROCEDURE:  The patient was given a general anesthetic, placed in dorsal lithotomy position. He was prepped and draped in sterile fashion. I passed a 22-Citizen of [place] cystoscope into the urethra using video camera and 30-degrees lens. The urethra showed some wide-bore strictures of the bulbous urethra. I was able to pass the scope through these. There is significant lateral lobe hypertrophy of the prostate causing obstruction. Scope was passed into the bladder. The right ureteral orifice is noted and is  somewhat patulous likely due to the recent ureteroscopic procedure for a distal ureteral stone. I passed a sensor guidewire up the right ureter using fluoroscopic guidance and passed the wire up into the superior portion of the right kidney. Over that we passed a 7-Citizen of [place] 26-cm long double-J stent successfully. The proximal coil was in the upper dominik and the distal coil was in the bladder. Suture was left attached to the distal end of the stent. This was left protruding from the patient's urethral meatus. There did appear to be an ovoid stone in the area of the renal pelvis alongside the stent.   We began treatment at a low treatment setting with the lithotripsy machine, gradually increased this up to a power setting of [place]. A total of 3000 shocks were given. Stone showed evidence of early pulverization. At the end of procedure there did appear to be a calcification near the superior portion of the kidney, which may have been a small flake of the renal stone. PLAN:  He is to be discharged home. He is going to return to the office in about 2 weeks for KUB.         Cornia Wang MD      [id number]  D:  08/10/2021 15:23  T:  08/11/2021 0:02  JOB #:  [id number]

## 2021-08-11 NOTE — PROGRESS NOTES
Attempted to call pt x2 for post-op phone call. Left VM with instructions for pt to call back if they have any questions or concerns.

## 2021-08-20 ENCOUNTER — HOSPITAL ENCOUNTER (EMERGENCY)
Age: 66
Discharge: HOME | End: 2021-08-20
Attending: EMERGENCY MEDICINE
Payer: MEDICARE

## 2021-08-20 VITALS
WEIGHT: 264 LBS | HEIGHT: 69 IN | TEMPERATURE: 99 F | BODY MASS INDEX: 39.1 KG/M2 | HEART RATE: 102 BPM | OXYGEN SATURATION: 95 % | SYSTOLIC BLOOD PRESSURE: 117 MMHG | DIASTOLIC BLOOD PRESSURE: 75 MMHG | RESPIRATION RATE: 19 BRPM

## 2021-08-20 DIAGNOSIS — U07.1 COVID-19: Primary | ICD-10-CM

## 2021-08-20 LAB
COVID-19 RAPID TEST, COVR: DETECTED
SOURCE, COVRS: ABNORMAL

## 2021-08-20 PROCEDURE — 99282 EMERGENCY DEPT VISIT SF MDM: CPT

## 2021-08-20 PROCEDURE — 87635 SARS-COV-2 COVID-19 AMP PRB: CPT

## 2021-08-20 NOTE — ED TRIAGE NOTES
Requests covid test. Reports fatigue for a few days, poor appetite, cough, body ache. Denies KATIE flores.
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
## 2021-08-20 NOTE — DISCHARGE INSTRUCTIONS
Push fluids use daily multivitamins Tylenol Motrin for any body aches or fever, return to ER if severe chest pain or shortness of breath uncontrollable nausea or diarrhea.   You must be symptom-free for 72 hours to remove yourself from isolation

## 2021-08-20 NOTE — ED NOTES
I have reviewed discharge instructions with the patient. The patient verbalized understanding. Patient left ED via Discharge Method: ambulatory to Home with family. Opportunity for questions and clarification provided. Patient given 0 scripts. To continue your aftercare when you leave the hospital, you may receive an automated call from our care team to check in on how you are doing. This is a free service and part of our promise to provide the best care and service to meet your aftercare needs.  If you have questions, or wish to unsubscribe from this service please call [phone]. Thank you for Choosing our [facility].

## 2021-10-06 ENCOUNTER — ANESTHESIA EVENT (OUTPATIENT)
Dept: SURGERY | Age: 66
End: 2021-10-06
Payer: MEDICARE

## 2021-10-06 ENCOUNTER — HOSPITAL ENCOUNTER (OUTPATIENT)
Dept: LAB | Age: 66
Discharge: HOME | End: 2021-10-06
Payer: MEDICARE

## 2021-10-06 LAB
ANION GAP SERPL CALC-SCNC: 4 MMOL/L (ref 7–16)
BUN SERPL-MCNC: 20 MG/DL (ref 8–23)
CALCIUM SERPL-MCNC: 8.9 MG/DL (ref 8.3–10.4)
CHLORIDE SERPL-SCNC: 104 MMOL/L (ref 98–107)
CO2 SERPL-SCNC: 31 MMOL/L (ref 21–32)
CREAT SERPL-MCNC: 1.91 MG/DL (ref 0.8–1.5)
ERYTHROCYTE [DISTWIDTH] IN BLOOD BY AUTOMATED COUNT: 19.8 % (ref 11.9–14.6)
GLUCOSE SERPL-MCNC: 208 MG/DL (ref 65–100)
HCT VFR BLD AUTO: 32.7 % (ref 41.1–50.3)
HGB BLD-MCNC: 9.6 G/DL (ref 13.6–17.2)
MCH RBC QN AUTO: 18.9 PG (ref 26.1–32.9)
MCHC RBC AUTO-ENTMCNC: 29.4 G/DL (ref 31.4–35)
MCV RBC AUTO: 64.2 FL (ref 79.6–97.8)
NRBC # BLD: 0 K/UL (ref 0–0.2)
PLATELET # BLD AUTO: 380 K/UL (ref 150–450)
PMV BLD AUTO: ABNORMAL FL (ref 9.4–12.3)
POTASSIUM SERPL-SCNC: 4.4 MMOL/L (ref 3.5–5.1)
RBC # BLD AUTO: 5.09 M/UL (ref 4.23–5.6)
SODIUM SERPL-SCNC: 139 MMOL/L (ref 138–145)
WBC # BLD AUTO: 11.2 K/UL (ref 4.3–11.1)

## 2021-10-06 PROCEDURE — 36415 COLL VENOUS BLD VENIPUNCTURE: CPT

## 2021-10-06 PROCEDURE — 80048 BASIC METABOLIC PNL TOTAL CA: CPT

## 2021-10-06 PROCEDURE — 85027 COMPLETE CBC AUTOMATED: CPT

## 2021-10-06 NOTE — PERIOP NOTES
Recent Results (from the past 12 hour(s))   CBC W/O DIFF    Collection Time: 10/06/21  9:42 AM   Result Value Ref Range    WBC 11.2 (H) 4.3 - 11.1 K/uL    RBC 5.09 4.23 - 5.6 M/uL    HGB 9.6 (L) 13.6 - 17.2 g/dL    HCT 32.7 (L) 41.1 - 50.3 %    MCV 64.2 (L) 79.6 - 97.8 FL    MCH 18.9 (L) 26.1 - 32.9 PG    MCHC 29.4 (L) 31.4 - 35.0 g/dL    RDW 19.8 (H) 11.9 - 14.6 %    PLATELET 844 720 - 213 K/uL    MPV Unable to calculate. Recommend adding IPF.  9.4 - 12.3 FL    ABSOLUTE NRBC 0.00 0.0 - 0.2 K/uL   METABOLIC PANEL, BASIC    Collection Time: 10/06/21  9:42 AM   Result Value Ref Range    Sodium 139 138 - 145 mmol/L    Potassium 4.4 3.5 - 5.1 mmol/L    Chloride 104 98 - 107 mmol/L    CO2 31 21 - 32 mmol/L    Anion gap 4 (L) 7 - 16 mmol/L    Glucose 208 (H) 65 - 100 mg/dL    BUN 20 8 - 23 MG/DL    Creatinine 1.91 (H) 0.8 - 1.5 MG/DL    GFR est AA 45 (L) >60 ml/min/1.73m2    GFR est non-AA 38 (L) >60 ml/min/1.73m2    Calcium 8.9 8.3 - 10.4 MG/DL       Results received and routed to surgeon's office

## 2021-10-07 ENCOUNTER — HOSPITAL ENCOUNTER (OUTPATIENT)
Age: 66
Setting detail: OUTPATIENT SURGERY
Discharge: HOME | End: 2021-10-07
Attending: UROLOGY | Admitting: UROLOGY
Payer: MEDICARE

## 2021-10-07 ENCOUNTER — ANESTHESIA (OUTPATIENT)
Dept: SURGERY | Age: 66
End: 2021-10-07
Payer: MEDICARE

## 2021-10-07 VITALS
HEIGHT: 69 IN | BODY MASS INDEX: 38.33 KG/M2 | RESPIRATION RATE: 18 BRPM | HEART RATE: 85 BPM | WEIGHT: 258.8 LBS | TEMPERATURE: 98.2 F | DIASTOLIC BLOOD PRESSURE: 61 MMHG | OXYGEN SATURATION: 96 % | SYSTOLIC BLOOD PRESSURE: 122 MMHG

## 2021-10-07 DIAGNOSIS — N20.0 RENAL STONE: Primary | ICD-10-CM

## 2021-10-07 LAB
GLUCOSE BLD STRIP.AUTO-MCNC: 126 MG/DL (ref 65–100)
GLUCOSE BLD STRIP.AUTO-MCNC: 188 MG/DL (ref 65–100)
SERVICE CMNT-IMP: ABNORMAL
SERVICE CMNT-IMP: ABNORMAL

## 2021-10-07 PROCEDURE — 74011250636 HC RX REV CODE- 250/636: Performed by: NURSE PRACTITIONER

## 2021-10-07 PROCEDURE — 77030040361 HC SLV COMPR DVT MDII -B: Performed by: UROLOGY

## 2021-10-07 PROCEDURE — C1894 INTRO/SHEATH, NON-LASER: HCPCS | Performed by: UROLOGY

## 2021-10-07 PROCEDURE — 74011000250 HC RX REV CODE- 250: Performed by: NURSE ANESTHETIST, CERTIFIED REGISTERED

## 2021-10-07 PROCEDURE — 82962 GLUCOSE BLOOD TEST: CPT

## 2021-10-07 PROCEDURE — 52356 CYSTO/URETERO W/LITHOTRIPSY: CPT | Performed by: UROLOGY

## 2021-10-07 PROCEDURE — 77030035011 HC LSR FBR HOLM FLXIVA TRAC TIP BSC -F: Performed by: UROLOGY

## 2021-10-07 PROCEDURE — 76210000006 HC OR PH I REC 0.5 TO 1 HR: Performed by: UROLOGY

## 2021-10-07 PROCEDURE — 77030038846 HC SCPE URETSCP LITHOVUE DISP BSC -H: Performed by: UROLOGY

## 2021-10-07 PROCEDURE — 74011250636 HC RX REV CODE- 250/636: Performed by: ANESTHESIOLOGY

## 2021-10-07 PROCEDURE — 77030021678 HC GLIDESCP STAT DISP VERT -B: Performed by: ANESTHESIOLOGY

## 2021-10-07 PROCEDURE — 77030019927 HC TBNG IRR CYSTO BAXT -A: Performed by: UROLOGY

## 2021-10-07 PROCEDURE — 76010000153 HC OR TIME 1.5 TO 2 HR: Performed by: UROLOGY

## 2021-10-07 PROCEDURE — 77030019908 HC STETH ESOPH SIMS -A: Performed by: ANESTHESIOLOGY

## 2021-10-07 PROCEDURE — C1769 GUIDE WIRE: HCPCS | Performed by: UROLOGY

## 2021-10-07 PROCEDURE — C2617 STENT, NON-COR, TEM W/O DEL: HCPCS | Performed by: UROLOGY

## 2021-10-07 PROCEDURE — 74011250636 HC RX REV CODE- 250/636: Performed by: NURSE ANESTHETIST, CERTIFIED REGISTERED

## 2021-10-07 PROCEDURE — 2709999900 HC NON-CHARGEABLE SUPPLY: Performed by: UROLOGY

## 2021-10-07 PROCEDURE — 77030037088 HC TUBE ENDOTRACH ORAL NSL COVD-A: Performed by: ANESTHESIOLOGY

## 2021-10-07 PROCEDURE — 76060000034 HC ANESTHESIA 1.5 TO 2 HR: Performed by: UROLOGY

## 2021-10-07 PROCEDURE — 76210000021 HC REC RM PH II 0.5 TO 1 HR: Performed by: UROLOGY

## 2021-10-07 DEVICE — URETERAL STENT
Type: IMPLANTABLE DEVICE | Site: URETER | Status: FUNCTIONAL
Brand: PERCUFLEX™ PLUS

## 2021-10-07 RX ORDER — FENTANYL CITRATE 50 UG/ML
100 INJECTION, SOLUTION INTRAMUSCULAR; INTRAVENOUS ONCE
Status: DISCONTINUED | OUTPATIENT
Start: 2021-10-07 | End: 2021-10-07 | Stop reason: HOSPADM

## 2021-10-07 RX ORDER — SODIUM CHLORIDE 0.9 % (FLUSH) 0.9 %
5-40 SYRINGE (ML) INJECTION EVERY 8 HOURS
Status: DISCONTINUED | OUTPATIENT
Start: 2021-10-07 | End: 2021-10-07 | Stop reason: HOSPADM

## 2021-10-07 RX ORDER — MIDAZOLAM HYDROCHLORIDE 1 MG/ML
2 INJECTION, SOLUTION INTRAMUSCULAR; INTRAVENOUS
Status: DISCONTINUED | OUTPATIENT
Start: 2021-10-07 | End: 2021-10-07 | Stop reason: HOSPADM

## 2021-10-07 RX ORDER — DEXAMETHASONE SODIUM PHOSPHATE 4 MG/ML
INJECTION, SOLUTION INTRA-ARTICULAR; INTRALESIONAL; INTRAMUSCULAR; INTRAVENOUS; SOFT TISSUE AS NEEDED
Status: DISCONTINUED | OUTPATIENT
Start: 2021-10-07 | End: 2021-10-07 | Stop reason: HOSPADM

## 2021-10-07 RX ORDER — ROCURONIUM BROMIDE 10 MG/ML
INJECTION, SOLUTION INTRAVENOUS AS NEEDED
Status: DISCONTINUED | OUTPATIENT
Start: 2021-10-07 | End: 2021-10-07 | Stop reason: HOSPADM

## 2021-10-07 RX ORDER — PHENAZOPYRIDINE HYDROCHLORIDE 200 MG/1
200 TABLET, FILM COATED ORAL
Qty: 30 TABLET | Refills: 3 | Status: SHIPPED | OUTPATIENT
Start: 2021-10-07 | End: 2021-10-10

## 2021-10-07 RX ORDER — ALBUTEROL SULFATE 0.83 MG/ML
2.5 SOLUTION RESPIRATORY (INHALATION) AS NEEDED
Status: DISCONTINUED | OUTPATIENT
Start: 2021-10-07 | End: 2021-10-07 | Stop reason: HOSPADM

## 2021-10-07 RX ORDER — LIDOCAINE HYDROCHLORIDE 10 MG/ML
0.1 INJECTION INFILTRATION; PERINEURAL AS NEEDED
Status: DISCONTINUED | OUTPATIENT
Start: 2021-10-07 | End: 2021-10-07 | Stop reason: HOSPADM

## 2021-10-07 RX ORDER — FENTANYL CITRATE 50 UG/ML
INJECTION, SOLUTION INTRAMUSCULAR; INTRAVENOUS AS NEEDED
Status: DISCONTINUED | OUTPATIENT
Start: 2021-10-07 | End: 2021-10-07 | Stop reason: HOSPADM

## 2021-10-07 RX ORDER — ONDANSETRON 2 MG/ML
4 INJECTION INTRAMUSCULAR; INTRAVENOUS ONCE
Status: DISCONTINUED | OUTPATIENT
Start: 2021-10-07 | End: 2021-10-07 | Stop reason: HOSPADM

## 2021-10-07 RX ORDER — CEFAZOLIN SODIUM/WATER 2 G/20 ML
2 SYRINGE (ML) INTRAVENOUS ONCE
Status: COMPLETED | OUTPATIENT
Start: 2021-10-07 | End: 2021-10-07

## 2021-10-07 RX ORDER — SODIUM CHLORIDE, SODIUM LACTATE, POTASSIUM CHLORIDE, CALCIUM CHLORIDE 600; 310; 30; 20 MG/100ML; MG/100ML; MG/100ML; MG/100ML
100 INJECTION, SOLUTION INTRAVENOUS CONTINUOUS
Status: DISCONTINUED | OUTPATIENT
Start: 2021-10-07 | End: 2021-10-07 | Stop reason: HOSPADM

## 2021-10-07 RX ORDER — ONDANSETRON 2 MG/ML
INJECTION INTRAMUSCULAR; INTRAVENOUS AS NEEDED
Status: DISCONTINUED | OUTPATIENT
Start: 2021-10-07 | End: 2021-10-07 | Stop reason: HOSPADM

## 2021-10-07 RX ORDER — MIDAZOLAM HYDROCHLORIDE 1 MG/ML
2 INJECTION, SOLUTION INTRAMUSCULAR; INTRAVENOUS ONCE
Status: DISCONTINUED | OUTPATIENT
Start: 2021-10-07 | End: 2021-10-07 | Stop reason: HOSPADM

## 2021-10-07 RX ORDER — NALOXONE HYDROCHLORIDE 0.4 MG/ML
0.1 INJECTION, SOLUTION INTRAMUSCULAR; INTRAVENOUS; SUBCUTANEOUS AS NEEDED
Status: DISCONTINUED | OUTPATIENT
Start: 2021-10-07 | End: 2021-10-07 | Stop reason: HOSPADM

## 2021-10-07 RX ORDER — OXYCODONE HYDROCHLORIDE 5 MG/1
10 TABLET ORAL
Status: DISCONTINUED | OUTPATIENT
Start: 2021-10-07 | End: 2021-10-07 | Stop reason: HOSPADM

## 2021-10-07 RX ORDER — SODIUM CHLORIDE 0.9 % (FLUSH) 0.9 %
5-40 SYRINGE (ML) INJECTION AS NEEDED
Status: DISCONTINUED | OUTPATIENT
Start: 2021-10-07 | End: 2021-10-07 | Stop reason: HOSPADM

## 2021-10-07 RX ORDER — PROPOFOL 10 MG/ML
INJECTION, EMULSION INTRAVENOUS AS NEEDED
Status: DISCONTINUED | OUTPATIENT
Start: 2021-10-07 | End: 2021-10-07 | Stop reason: HOSPADM

## 2021-10-07 RX ORDER — HYDROMORPHONE HYDROCHLORIDE 2 MG/ML
0.5 INJECTION, SOLUTION INTRAMUSCULAR; INTRAVENOUS; SUBCUTANEOUS
Status: DISCONTINUED | OUTPATIENT
Start: 2021-10-07 | End: 2021-10-07 | Stop reason: HOSPADM

## 2021-10-07 RX ORDER — HYDROCODONE BITARTRATE AND ACETAMINOPHEN 5; 325 MG/1; MG/1
1 TABLET ORAL
Qty: 15 TABLET | Refills: 0 | Status: SHIPPED | OUTPATIENT
Start: 2021-10-07 | End: 2021-10-12

## 2021-10-07 RX ORDER — LIDOCAINE HYDROCHLORIDE 20 MG/ML
INJECTION, SOLUTION EPIDURAL; INFILTRATION; INTRACAUDAL; PERINEURAL AS NEEDED
Status: DISCONTINUED | OUTPATIENT
Start: 2021-10-07 | End: 2021-10-07 | Stop reason: HOSPADM

## 2021-10-07 RX ORDER — OXYCODONE HYDROCHLORIDE 5 MG/1
5 TABLET ORAL
Status: DISCONTINUED | OUTPATIENT
Start: 2021-10-07 | End: 2021-10-07 | Stop reason: HOSPADM

## 2021-10-07 RX ORDER — DIPHENHYDRAMINE HYDROCHLORIDE 50 MG/ML
12.5 INJECTION, SOLUTION INTRAMUSCULAR; INTRAVENOUS
Status: DISCONTINUED | OUTPATIENT
Start: 2021-10-07 | End: 2021-10-07 | Stop reason: HOSPADM

## 2021-10-07 RX ADMIN — PHENYLEPHRINE HYDROCHLORIDE 150 MCG: 10 INJECTION INTRAVENOUS at 16:38

## 2021-10-07 RX ADMIN — FENTANYL CITRATE 100 MCG: 50 INJECTION INTRAMUSCULAR; INTRAVENOUS at 16:31

## 2021-10-07 RX ADMIN — PHENYLEPHRINE HYDROCHLORIDE 150 MCG: 10 INJECTION INTRAVENOUS at 16:51

## 2021-10-07 RX ADMIN — SUGAMMADEX 400 MG: 100 INJECTION, SOLUTION INTRAVENOUS at 17:45

## 2021-10-07 RX ADMIN — PHENYLEPHRINE HYDROCHLORIDE 150 MCG: 10 INJECTION INTRAVENOUS at 17:26

## 2021-10-07 RX ADMIN — LIDOCAINE HYDROCHLORIDE 100 MG: 20 INJECTION, SOLUTION EPIDURAL; INFILTRATION; INTRACAUDAL; PERINEURAL at 16:31

## 2021-10-07 RX ADMIN — CEFAZOLIN 2 G: 1 INJECTION, POWDER, FOR SOLUTION INTRAVENOUS at 16:40

## 2021-10-07 RX ADMIN — SODIUM CHLORIDE, SODIUM LACTATE, POTASSIUM CHLORIDE, AND CALCIUM CHLORIDE: 600; 310; 30; 20 INJECTION, SOLUTION INTRAVENOUS at 17:22

## 2021-10-07 RX ADMIN — ROCURONIUM BROMIDE 20 MG: 10 INJECTION, SOLUTION INTRAVENOUS at 17:31

## 2021-10-07 RX ADMIN — PHENYLEPHRINE HYDROCHLORIDE 150 MCG: 10 INJECTION INTRAVENOUS at 17:19

## 2021-10-07 RX ADMIN — SODIUM CHLORIDE, SODIUM LACTATE, POTASSIUM CHLORIDE, AND CALCIUM CHLORIDE 100 ML/HR: 600; 310; 30; 20 INJECTION, SOLUTION INTRAVENOUS at 14:51

## 2021-10-07 RX ADMIN — PHENYLEPHRINE HYDROCHLORIDE 200 MCG: 10 INJECTION INTRAVENOUS at 17:11

## 2021-10-07 RX ADMIN — ONDANSETRON 4 MG: 2 INJECTION INTRAMUSCULAR; INTRAVENOUS at 16:31

## 2021-10-07 RX ADMIN — DEXAMETHASONE SODIUM PHOSPHATE 4 MG: 4 INJECTION, SOLUTION INTRAMUSCULAR; INTRAVENOUS at 16:31

## 2021-10-07 RX ADMIN — ROCURONIUM BROMIDE 50 MG: 10 INJECTION, SOLUTION INTRAVENOUS at 16:31

## 2021-10-07 RX ADMIN — PROPOFOL 200 MG: 10 INJECTION, EMULSION INTRAVENOUS at 16:31

## 2021-10-07 RX ADMIN — PHENYLEPHRINE HYDROCHLORIDE 200 MCG: 10 INJECTION INTRAVENOUS at 16:44

## 2021-10-07 NOTE — PERIOP NOTES
Discharge instructions reviewed with patient and spouse, Presley. Both verbalized understanding. Questions answered, concerns addressed. Papers with her. Unable to use e-signature pad d/t malfunction.

## 2021-10-07 NOTE — ANESTHESIA POSTPROCEDURE EVALUATION
Procedure(s):  CYSTOSCOPY / RIGHT URETEROSCOPY / Cosimo Norlander / STENT EXCHANGE. general    Anesthesia Post Evaluation      Multimodal analgesia: multimodal analgesia used between 6 hours prior to anesthesia start to PACU discharge  Patient location during evaluation: PACU  Patient participation: complete - patient participated  Level of consciousness: awake and awake and alert  Pain management: adequate  Airway patency: patent  Anesthetic complications: no  Cardiovascular status: acceptable  Respiratory status: acceptable  Hydration status: acceptable  Post anesthesia nausea and vomiting:  controlled      INITIAL Post-op Vital signs:   Vitals Value Taken Time   /68 10/07/21 1837   Temp 36.6 °C (97.9 °F) 10/07/21 1809   Pulse 82 10/07/21 1837   Resp 20 10/07/21 1830   SpO2 95 % 10/07/21 1837   Vitals shown include unvalidated device data.

## 2021-10-07 NOTE — DISCHARGE INSTRUCTIONS
Ureteral Stent Placement: What to Expect at [address] [place] [address]    Take IBUPROFEN or TYLENOL for pain tonight. Call the office tomorrow morning, if you need stronger pain medication. A ureteral (say \"you-REE-ter-ul\") stent is a thin, hollow tube that is placed in the ureter to help urine pass from the kidney into the bladder. Ureters are the tubes that connect the kidneys to the bladder. You may have a small amount of blood in your urine for 1 to 3 days after the procedure. While the stent is in place, you may have to urinate more often, feel a sudden need to urinate, or feel like you cannot completely empty your bladder. You may feel some pain when you urinate or do strenuous activity. You also may notice a small amount of blood in your urine after strenuous activities. These side effects usually do not prevent people from doing their normal daily activities. You may have a string attached to your stent. Do not to pull the string unless the doctor tells you to. The doctor will use the string to pull out the stent when you no longer need it. After the procedure, urine may flow better from your kidneys to your bladder. A ureteral stent may be left in place for several days or for as long as several months. Your doctor will take it out when you no longer need it. Cystoscopy: What to Expect at [address] [place] [address]  A cystoscopy is a procedure that lets a doctor look inside of the bladder and the urethra. The urethra is the tube that carries urine from the bladder to outside the body. The doctor uses a thin, lighted tube called a cystoscope to look for kidney or bladder stones, tumors, bleeding, or infection. After the cystoscopy, your urethra may be sore at first, and it may burn when you urinate for the first few days after the procedure. You may feel the need to urinate more often, and your urine may be pink. These symptoms should get better in 1 or 2 days.  You will probably be able to go back to work or most of your usual activities in 1 or 2 days. How can you care for yourself at home? Activity  · Rest when you feel tired. Getting enough sleep will help you recover. · Try to walk each day. Start by walking a little more than you did the day before. Bit by bit, increase the amount you walk. Walking boosts blood flow and helps prevent pneumonia and constipation. · Avoid strenuous activities, such as bicycle riding, jogging, weight lifting, or aerobic exercise, until your doctor says it is okay. · Ask your doctor when you can drive again. · Most people are able to return to work within 1 or 2 days after the procedure. · You may shower and take baths as usual.  · Ask your doctor when it is okay for you to have sex. Diet  · You can eat your normal diet. If your stomach is upset, try bland, low-fat foods like plain rice, broiled chicken, toast, and yogurt. · Drink plenty of fluids (unless your doctor tells you not to). Medicines  · Take pain medicines exactly as directed. ¨ If the doctor gave you a prescription medicine for pain, take it as prescribed. ¨ If you are not taking a prescription pain medicine, ask your doctor if you can take an over-the-counter medicine. · If you think your pain medicine is making you sick to your stomach:  ¨ Take your medicine after meals (unless your doctor has told you not to). ¨ Ask your doctor for a different pain medicine. · If your doctor prescribed antibiotics, take them as directed. Do not stop taking them just because you feel better. You need to take the full course of antibiotics. Medication Interaction:  During your procedure you potentially received a medication or medications which may reduce the effectiveness of oral contraceptives. Please consider other forms of contraception for 1 month following your procedure if you are currently using oral contraceptives as your primary form of birth control.  In addition to this, we recommend continuing your oral contraceptive as prescribed, unless otherwise instructed by your physician, during this time. Follow-up care is a key part of your treatment and safety. Be sure to make and go to all appointments, and call your doctor if you are having problems. It's also a good idea to know your test results and keep a list of the medicines you take. When should you call for help? Call 911 anytime you think you may need emergency care. For example, call if:  · You passed out (lost consciousness). · You have severe trouble breathing. · You have sudden chest pain and shortness of breath, or you cough up blood. · You have severe belly pain. Call your doctor now or seek immediate medical care if:  · You are sick to your stomach or cannot keep fluids down. · Your urine is still red or you see blood clots after you have urinated several times. · You have trouble passing urine or stool, especially if you have pain or swelling in your lower belly. · You have signs of a blood clot, such as:  ¨ Pain in your calf, back of the knee, thigh, or groin. ¨ Redness and swelling in your leg or groin. · You develop a fever or severe chills. · You have pain in your back just below your rib cage. This is called flank pain. Watch closely for changes in your health, and be sure to contact your doctor if:  · A burning feeling is normal for a day or two after the test, but call if it does not get better. · You have a frequent urge to urinate but can pass only small amounts of urine. · It is normal for the urine to have a pinkish color for a few days after the test, but call if it does not get better or if your urine is cloudy, smells bad, or has pus in it.     After general anesthesia or intravenous sedation, for 24 hours or while taking prescription Narcotics:  · Limit your activities  · A responsible adult needs to be with you for the next 24 hours  · Do not drive and operate hazardous machinery  · Do not make important personal or business decisions  · Do not drink alcoholic beverages  · If you have not urinated within 8 hours after discharge, and you are experiencing discomfort from urinary retention, please go to the nearest ED. · If you have sleep apnea and have a CPAP machine, please use it for all naps and sleeping. · Please use caution when taking narcotics and any of your home medications that may cause drowsiness. *  Please give a list of your current medications to your Primary Care Provider. *  Please update this list whenever your medications are discontinued, doses are      changed, or new medications (including over-the-counter products) are added. *  Please carry medication information at all times in case of emergency situations. These are general instructions for a healthy lifestyle:  No smoking/ No tobacco products/ Avoid exposure to second hand smoke  Surgeon General's Warning:  Quitting smoking now greatly reduces serious risk to your health. Obesity, smoking, and sedentary lifestyle greatly increases your risk for illness  A healthy diet, regular physical exercise & weight monitoring are important for maintaining a healthy lifestyle    You may be retaining fluid if you have a history of heart failure or if you experience any of the following symptoms:  Weight gain of 3 pounds or more overnight or 5 pounds in a week, increased swelling in our hands or feet or shortness of breath while lying flat in bed. Please call your doctor as soon as you notice any of these symptoms; do not wait until your next office visit.
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
## 2021-10-07 NOTE — ANESTHESIA PREPROCEDURE EVALUATION
Anesthetic History               Review of Systems / Medical History  Patient summary reviewed and pertinent labs reviewed    Pulmonary        Sleep apnea        Comments: S/p UPP- no treatment now   Neuro/Psych   Within defined limits           Cardiovascular    Hypertension              Exercise tolerance: >4 METS     GI/Hepatic/Renal     GERD    Renal disease: CRI and stones  Liver disease    Comments: S/p nephrectomy for renal cell?  Ca  Hepatic steatosis  Hx of pancreatic mass Endo/Other    Diabetes: type 2, using insulin  Hypothyroidism  Morbid obesity    Comments: S/p thyroidectomy for thyroid ca Other Findings              Physical Exam    Airway  Mallampati: II  TM Distance: > 6 cm  Neck ROM: normal range of motion   Mouth opening: Normal     Cardiovascular    Rhythm: regular           Dental    Dentition: Caps/crowns     Pulmonary                 Abdominal  GI exam deferred       Other Findings            Anesthetic Plan    ASA: 3  Anesthesia type: general and spinal          Induction: Intravenous  Anesthetic plan and risks discussed with: Patient

## 2021-10-07 NOTE — PERIOP NOTES
Spoke with Maritza Ratliff MD about patient's prescriptions being sent to home delivery service based in [address] [place] [address]. He said that because the obstruction has been released, the patient should not need much pain medication. For tonight, the patient should be instructed to take Ibuprofen/Tylenol as needed. If stronger pain medication is needed, he can call the office tomorrow.

## 2021-10-08 NOTE — OP NOTES
[facility]  OPERATIVE REPORT    Name:  Kailee Gonzales  MR#:  [id number]  :  1955  ACCOUNT #:  [de-identified]  DATE OF SERVICE:  10/07/2021    PREOPERATIVE DIAGNOSES:  1. Right renal calculus. 2.  Solitary right kidney. POSTOPERATIVE DIAGNOSES:  1. Right renal calculus. 2.  Solitary right kidney. PROCEDURE PERFORMED:  Right ureteropyeloscopy, holmium laser of right renal calculus, and replacement of right double-J ureteral stent. SURGEON:  Cecilia Rodas MD    ASSISTANT:  None. ANESTHESIA:  General.    COMPLICATIONS:  None. SPECIMENS REMOVED:  None. IMPLANTS:  Right ureteral stent. ESTIMATED BLOOD LOSS:  Minimal.    FINDINGS:  A 7 mm stone in the right mid dominik which was fragmented completely. PROCEDURE:  The patient was given general anesthetic, placed in the dorsal lithotomy position. He was prepped and draped in sterile fashion. I passed a 22-Tanzanian cystoscope into the urethra using video camera and 30 degrees lens. The anterior urethra was normal.  Prostate shows BPH. The scope was passed into the bladder. There is a stent protruding from the right ureteral orifice as well as suture attached to the stent within the urethra. We grasped the stent with forceps and pulled the end of the stent out of the urethra. A 0.038 floppy-tip guidewire was passed up through the lumen of the stent into the right kidney, leaving the wire in place. We then passed a 6.5 Tanzanian semi-rigid ureteroscope alongside the guidewire. This was passed up the dilated right ureter. No stones were seen in the ureter. We could not visualize the right renal pelvis. I passed another guidewire through the lumen of the ureteroscope so that there were two wires in place. A navigator  coaxial ureteral access sheath was passed over one of the wires and passed easily up to the right ureteropelvic junction. The inner obturator was removed.   The LithoVue digital flexible ureteroscope was passed through the sheath. This was passed up into the right kidney. Visualization was good. I was able to visualize the upper, mid and lower calyces. There was a stone lodged within the infundibulum to a right inferolateral dominik. It matches the stone seen on the KUB, measures about 7 mm in diameter. A 200-micron holmium laser fiber was used at a setting of 0.2 joules and 50 Hz. The stone was fragmented partially and pushed back into the dominik. We then increased the power to 0.4 joules and then 0.5 joules at 50 Hz. The stone was gradually fragmented until all the pieces were 1 or 2 mm in size. There was some mucosal bleeding which obscured vision and it formed a clot within the dominik. I was able to irrigate this with a syringe and get the clot out of the way. There were no stones noted. The visualization at the end was good except for the clot within the dominik. It should be noted that I was able to visualize all of the mucosa within the dominik and all of the affected calyces of the right kidney. There may have been some small fragments within the clot, but I think that this is small enough to pass. The scope was removed along with the sheath leaving the second guidewire in place. This was backloaded into the cystoscope and a 7-Arabic 26-cm long double-J stent was passed successfully and left in good position with suture exiting the urethral meatus. PLAN:  He will be discharged home. Return to the office in about 10 days for KUB and stent removal.  I anticipate that he will need a metabolic workup in the future.         MD ANAT [id number]  D:  10/07/2021 17:59  T:  10/08/2021 1:01  JOB #:  [id number]

## 2022-02-20 NOTE — PERIOP NOTES
FSBS: 139
Pt notified via voicemail that a negative Covid swab result is required to proceed with surgery; the swab will be collected 7 days prior to surgery at the [facility] at [address] [place] [address]. Appointment date/time found in EHR and provided to patient.
Second voicemail left for pt informing of Covid testing appointment today.
Fall precautions/Position of comfort

## 2022-03-18 PROBLEM — E78.2 MIXED HYPERLIPIDEMIA: Status: ACTIVE | Noted: 2021-07-02

## 2022-03-18 PROBLEM — N17.9 ACUTE RENAL FAILURE SUPERIMPOSED ON STAGE 3A CHRONIC KIDNEY DISEASE (HCC): Status: ACTIVE | Noted: 2021-06-15

## 2022-03-18 PROBLEM — C73 THYROID CANCER (HCC): Status: ACTIVE | Noted: 2021-02-02

## 2022-03-18 PROBLEM — N18.31 ACUTE RENAL FAILURE SUPERIMPOSED ON STAGE 3A CHRONIC KIDNEY DISEASE (HCC): Status: ACTIVE | Noted: 2021-06-15

## 2022-03-18 PROBLEM — E89.0 POSTSURGICAL HYPOTHYROIDISM: Status: ACTIVE | Noted: 2017-03-08

## 2022-03-19 PROBLEM — I10 ESSENTIAL HYPERTENSION: Status: ACTIVE | Noted: 2017-03-08

## 2022-03-19 PROBLEM — E66.01 CLASS 2 SEVERE OBESITY DUE TO EXCESS CALORIES WITH SERIOUS COMORBIDITY AND BODY MASS INDEX (BMI) OF 38.0 TO 38.9 IN ADULT (HCC): Status: ACTIVE | Noted: 2019-08-29

## 2022-03-19 PROBLEM — K86.89 PANCREATIC MASS: Status: ACTIVE | Noted: 2020-05-15

## 2022-03-19 PROBLEM — N13.5 URETERAL OBSTRUCTION: Status: ACTIVE | Noted: 2021-06-15

## 2022-03-19 PROBLEM — E66.812 CLASS 2 SEVERE OBESITY DUE TO EXCESS CALORIES WITH SERIOUS COMORBIDITY AND BODY MASS INDEX (BMI) OF 38.0 TO 38.9 IN ADULT: Status: ACTIVE | Noted: 2019-08-29

## 2022-03-20 PROBLEM — N18.31 STAGE 3A CHRONIC KIDNEY DISEASE (HCC): Status: ACTIVE | Noted: 2020-11-18

## 2022-05-09 DIAGNOSIS — Z00.00 ROUTINE GENERAL MEDICAL EXAMINATION AT A HEALTH CARE FACILITY: ICD-10-CM

## 2022-05-09 DIAGNOSIS — I10 ESSENTIAL HYPERTENSION: ICD-10-CM

## 2022-05-09 DIAGNOSIS — E78.2 MIXED HYPERLIPIDEMIA: ICD-10-CM

## 2022-05-09 DIAGNOSIS — R79.89 TSH ELEVATION: Primary | ICD-10-CM

## 2022-05-09 PROBLEM — E11.9 TYPE 2 DIABETES MELLITUS (HCC): Status: ACTIVE | Noted: 2022-05-09

## 2022-05-09 PROBLEM — C64.2 MALIGNANT NEOPLASM OF LEFT KIDNEY (HCC): Status: ACTIVE | Noted: 2022-05-09

## 2022-06-15 ENCOUNTER — OFFICE VISIT (OUTPATIENT)
Dept: ENDOCRINOLOGY | Age: 67
End: 2022-06-15
Payer: COMMERCIAL

## 2022-06-15 VITALS
OXYGEN SATURATION: 93 % | BODY MASS INDEX: 42.65 KG/M2 | WEIGHT: 288 LBS | SYSTOLIC BLOOD PRESSURE: 142 MMHG | HEART RATE: 80 BPM | DIASTOLIC BLOOD PRESSURE: 86 MMHG | HEIGHT: 69 IN

## 2022-06-15 DIAGNOSIS — C73 THYROID CANCER (HCC): ICD-10-CM

## 2022-06-15 DIAGNOSIS — E78.2 MIXED HYPERLIPIDEMIA: ICD-10-CM

## 2022-06-15 DIAGNOSIS — I10 ESSENTIAL HYPERTENSION: ICD-10-CM

## 2022-06-15 DIAGNOSIS — E89.0 POSTSURGICAL HYPOTHYROIDISM: ICD-10-CM

## 2022-06-15 LAB
HBA1C MFR BLD: 9.1 %
T4 FREE SERPL-MCNC: 1.3 NG/DL (ref 0.78–1.46)
TSH W FREE THYROID IF ABNORMAL: 4.2 UIU/ML (ref 0.36–3.74)

## 2022-06-15 PROCEDURE — 3017F COLORECTAL CA SCREEN DOC REV: CPT | Performed by: INTERNAL MEDICINE

## 2022-06-15 PROCEDURE — 4004F PT TOBACCO SCREEN RCVD TLK: CPT | Performed by: INTERNAL MEDICINE

## 2022-06-15 PROCEDURE — 1123F ACP DISCUSS/DSCN MKR DOCD: CPT | Performed by: INTERNAL MEDICINE

## 2022-06-15 PROCEDURE — 2022F DILAT RTA XM EVC RTNOPTHY: CPT | Performed by: INTERNAL MEDICINE

## 2022-06-15 PROCEDURE — 3046F HEMOGLOBIN A1C LEVEL >9.0%: CPT | Performed by: INTERNAL MEDICINE

## 2022-06-15 PROCEDURE — G8427 DOCREV CUR MEDS BY ELIG CLIN: HCPCS | Performed by: INTERNAL MEDICINE

## 2022-06-15 PROCEDURE — 99214 OFFICE O/P EST MOD 30 MIN: CPT | Performed by: INTERNAL MEDICINE

## 2022-06-15 PROCEDURE — G8417 CALC BMI ABV UP PARAM F/U: HCPCS | Performed by: INTERNAL MEDICINE

## 2022-06-15 PROCEDURE — 83036 HEMOGLOBIN GLYCOSYLATED A1C: CPT | Performed by: INTERNAL MEDICINE

## 2022-06-15 RX ORDER — ROSUVASTATIN CALCIUM 20 MG/1
20 TABLET, COATED ORAL DAILY
Qty: 90 TABLET | Refills: 3 | Status: SHIPPED | OUTPATIENT
Start: 2022-06-15 | End: 2022-06-30 | Stop reason: SDUPTHER

## 2022-06-15 RX ORDER — LOSARTAN POTASSIUM 25 MG/1
25 TABLET ORAL DAILY
Qty: 90 TABLET | Refills: 3
Start: 2022-06-15 | End: 2022-06-30 | Stop reason: SDUPTHER

## 2022-06-15 RX ORDER — HYDROCHLOROTHIAZIDE 25 MG/1
25 TABLET ORAL DAILY
Qty: 90 TABLET | Refills: 3
Start: 2022-06-15 | End: 2022-09-21 | Stop reason: SDUPTHER

## 2022-06-15 RX ORDER — FLASH GLUCOSE SENSOR
KIT MISCELLANEOUS
Qty: 6 EACH | Refills: 3 | Status: SHIPPED | OUTPATIENT
Start: 2022-06-15 | End: 2022-09-21 | Stop reason: SDUPTHER

## 2022-06-15 RX ORDER — INSULIN GLARGINE 100 [IU]/ML
80 INJECTION, SOLUTION SUBCUTANEOUS DAILY
Qty: 75 ML | Refills: 3 | Status: SHIPPED | OUTPATIENT
Start: 2022-06-15 | End: 2022-09-21 | Stop reason: SDUPTHER

## 2022-06-15 RX ORDER — PRAVASTATIN SODIUM 40 MG
40 TABLET ORAL DAILY
Qty: 90 TABLET | Refills: 3 | Status: CANCELLED | OUTPATIENT
Start: 2022-06-15

## 2022-06-15 RX ORDER — LEVOTHYROXINE SODIUM 0.2 MG/1
200 TABLET ORAL
Qty: 90 TABLET | Refills: 3 | Status: SHIPPED | OUTPATIENT
Start: 2022-06-15 | End: 2022-06-20 | Stop reason: SDUPTHER

## 2022-06-15 RX ORDER — METFORMIN HYDROCHLORIDE 500 MG/1
500 TABLET, EXTENDED RELEASE ORAL
Qty: 90 TABLET | Refills: 3 | Status: SHIPPED | OUTPATIENT
Start: 2022-06-15 | End: 2022-09-21 | Stop reason: SDUPTHER

## 2022-06-15 RX ORDER — LEVOTHYROXINE SODIUM 0.05 MG/1
50 TABLET ORAL
Qty: 90 TABLET | Refills: 3 | Status: SHIPPED | OUTPATIENT
Start: 2022-06-15 | End: 2022-06-20 | Stop reason: SDUPTHER

## 2022-06-15 RX ORDER — INSULIN LISPRO 100 [IU]/ML
INJECTION, SOLUTION INTRAVENOUS; SUBCUTANEOUS
Qty: 75 ML | Refills: 3 | Status: SHIPPED | OUTPATIENT
Start: 2022-06-15 | End: 2022-09-21 | Stop reason: SDUPTHER

## 2022-06-15 RX ORDER — FLASH GLUCOSE SCANNING READER
EACH MISCELLANEOUS
Qty: 1 EACH | Refills: 0 | Status: SHIPPED | OUTPATIENT
Start: 2022-06-15 | End: 2022-09-21 | Stop reason: SDUPTHER

## 2022-06-15 ASSESSMENT — ENCOUNTER SYMPTOMS
SHORTNESS OF BREATH: 0
DIARRHEA: 0
CONSTIPATION: 0

## 2022-06-15 NOTE — PROGRESS NOTES
Logan Guadarrama MD, [facility]  [address], [address] [facility], [address] [place] [address]  Phone [phone]  Facsimile [phone]          Mumtaz Guzman is a 78 y.o. male seen 6/15/2022 for follow-up of type 2 diabetes mellitus, history of thyroid cancer and hypothyroidism        ASSESSMENT AND PLAN:    1. Uncontrolled type 2 diabetes mellitus with microalbuminuria, with long-term current use of insulin (HCC)  His glycemic control remains suboptimal and his hemoglobin A1c is not at goal less than 7. I will increase his Lantus and Humalog as below. He has not been taking his Humalog with each meal and he was instructed to do so. I think he would benefit from a Freestyle Morales 2 continuous glucose monitor. Urine microalbumin positive 3/2022 (he is on an ARB). Eye exam overdue and I will refer him to ophthalmology. Update labs prior to next visit. - LANTUS SOLOSTAR 100 UNIT/ML injection pen; Inject 80 Units into the skin daily  Dispense: 75 mL; Refill: 3  - HUMALOG KWIKPEN 100 UNIT/ML SOPN; 20 units with meals with correction scale 3/50>150 (up to 75 units daily)  Dispense: 75 mL; Refill: 3  - metFORMIN (GLUCOPHAGE-XR) 500 MG extended release tablet; Take 1 tablet by mouth Daily with supper  Dispense: 90 tablet; Refill: 3  - Insulin Pen Needle 31G X 6 MM MISC; 4-5 times a day (E11.65)  Dispense: 450 each; Refill: 3  - Continuous Blood Gluc  (FREESTYLE MORALES 2 READER) VARUN; Use as directed  Dispense: 1 each; Refill: 0  - Continuous Blood Gluc Sensor (FREESTYLE MORALES 2 SENSOR) MISC; Change every 14 days  Dispense: 6 each; Refill: 3    2. Thyroid cancer [facility])  He reports a history of thyroid cancer status post total thyroidectomy in 2008 in [place], status post CHILDERS-131 (dose unknown). He then had a recurrence in right lateral cervical lymph nodes status post right lateral neck dissection in 2010. Neck ultrasound negative 2/2021.   Thyroglobulin level negative 2021. At this point he appears to be disease-free. I will monitor his thyroglobulin level once a year. 3. Postsurgical hypothyroidism  Goal TSH 0.4-2.0 since he appears to be disease-free. I will assess his thyroid function and let him know if his levothyroxine needs to be adjusted. - levothyroxine (SYNTHROID) 200 MCG tablet; Take 1 tablet by mouth every morning (before breakfast)  Dispense: 90 tablet; Refill: 3  - levothyroxine (SYNTHROID) 50 MCG tablet; Take 1 tablet by mouth every morning (before breakfast)  Dispense: 90 tablet; Refill: 3    4. Essential hypertension  Followed by another physician. - losartan (COZAAR) 25 MG tablet; Take 1 tablet by mouth daily TAKE 1 TABLET DAILY  Dispense: 90 tablet; Refill: 3  - hydroCHLOROthiazide (HYDRODIURIL) 25 MG tablet; Take 1 tablet by mouth daily  Dispense: 90 tablet; Refill: 3    5. Mixed hyperlipidemia  Lipids not at goal 3/2022. I will switch him from pravastatin to rosuvastatin. - rosuvastatin (CRESTOR) 20 MG tablet; Take 1 tablet by mouth daily  Dispense: 90 tablet; Refill: 3       Follow-up and Dispositions    · Return in about 4 months (around 10/15/2022). HISTORY OF PRESENT ILLNESS:    DIABETES MELLITUS     Diagnosis: Type 2 diabetes mellitus diagnosed 15-20 years ago.     Diet: He occasionally eats sweets. He snacks before bed most nights (nuts, occasionally ice cream).     Exercise: No regular exercise.      Monitorin times a day.      Blood Glucose: By recall: Fasting 280-330, after supper low to high 200s.    Hypoglycemia: No episodes.     Hemoglobin A1c:  10/29/2020: 8.3.  2021: 8.5.  2021: 9.3.  2021: 9.1.  2022: 9.2.  3/2/2022: 10.5.  6/15/2022: 9.1.     Microalbumin/Nephropathy:  10/8/2020: Creatinine 1.48 (GFR 49). 10/29/2020: Creatinine 1.71 (GFR 41), urine microalbumin-.0.  2021: Creatinine 1.31 (GFR 56). 2021: Creatinine 1.40 (GFR 52).   6/15/2021: Creatinine 3.32 (GFR 20).  6/16/2021: Creatinine 3.36 (GFR 20). 6/17/2021: Creatinine 1.95 (GFR 37). 11/11/2021: Creatinine 1.81 (GFR 38), urine microalbumin/creatinine 91.  3/2/2022: Creatinine 1.83 (GFR 40), urine microalbumin-.0     Neuropathy: He has a history of diabetic peripheral neuropathy and he occasionally has burning, numbness, tingling of feet.     Retinopathy: Eye exam overdue - no retinopathy.      Lipids:  10/29/2020: Total cholesterol 157, triglycerides 142, HDL 46, LDL 86, VLDL 25.  2/5/2021: Total cholesterol 149, triglycerides 212, HDL 43, LDL 71, VLDL 35.  11/11/2021: Total cholesterol 180, triglycerides 156, HDL 39, , VLDL 28.  3/2/2022: Total cholesterol 232, triglycerides 433, HDL 42, , VLDL 75.     Prior Treatment: He has a pancreatic cyst but no history of pancreatitis. He did not think Trulicity was effective so he stopped it on his own.         THYROID CANCER     Presentation: Bilateral thyroid cancer status post total thyroidectomy 2008, status post CHILDERS-131 2008 in [place]. He had a recurrence in right cervical lymph nodes in 2010 status post right lateral neck dissection.     Surgical complications: None.     Current symptoms: Denies neck lumps, lymphadenopathy, dysphagia, hoarseness.     Imaging:   Neck ultrasound 2/11/2021: No pathologic adenopathy.     Labs:  10/29/2020: TSH 0.031.  2/2/2021: TSH 0.005, free T4 2.22, Tg <0.1, Tg Ab <1.0.  3/26/2021: TSH <0.005, free T4 2.54.  6/16/2021: TSH <0.005.  11/11/2021: TSH 1.420, Tg <0.1, Tg Ab <1.0.  3/2/2022: TSH 9.550.        THYROID DISEASE     Presentation/Diagnosis: Postsurgical hypothyroidism     Symptoms: See review of systems.      Treatment: Takes generic in AM with other medications, including vitamins. Review of Systems   Constitutional: Positive for diaphoresis. Negative for fatigue. Weight increased 14 pounds since last visit. Respiratory: Negative for shortness of breath.     Cardiovascular: Negative for chest pain and palpitations. Gastrointestinal: Negative for constipation and diarrhea. Endocrine: Positive for heat intolerance. Negative for cold intolerance. Neurological: Negative for tremors. Vital Signs:  BP (!) 142/86   Pulse 80   Ht 5' 9\" (1.753 m)   Wt 288 lb (130.6 kg)   SpO2 93%   BMI 42.53 kg/m²     Wt Readings from Last 3 Encounters:   06/15/22 288 lb (130.6 kg)   05/20/22 283 lb (128.4 kg)   05/09/22 284 lb (128.8 kg)       Physical Exam  Constitutional:       General: He is not in acute distress. Neck:      Comments: Thyroidectomy and right lateral cervical scar. Cardiovascular:      Rate and Rhythm: Normal rate and regular rhythm. Comments: 1+ BLE edema. DP pulses 2+ bilaterally. Lymphadenopathy:      Cervical: No cervical adenopathy. Skin:     Comments: No lesions on feet. Neurological:      Motor: No tremor. Comments: Monofilament intact. Ankle reflexes absent. Orders Placed This Encounter   Procedures    TSH with Reflex     Standing Status:   Future     Standing Expiration Date:   6/15/2023    Hemoglobin A1C     Standing Status:   Future     Standing Expiration Date:   6/15/2023    Comprehensive Metabolic Panel     Standing Status:   Future     Standing Expiration Date:   6/15/2023    Lipid Panel     Standing Status:   Future     Standing Expiration Date:   6/15/2023     Order Specific Question:   Is Patient Fasting?/# of Hours     Answer:    Yes    Microalbumin / Creatinine Urine Ratio     Standing Status:   Future     Standing Expiration Date:   6/15/2023    TSH with Reflex     Standing Status:   Future     Standing Expiration Date:   6/15/2023    Thyroglobulin Panel     Standing Status:   Future     Standing Expiration Date:   6/15/2023    [address]     Referral Priority:   Routine     Referral Type:   Eval and Treat     Referral Reason:   Specialty Services Required     Referral Location:   [facility] Specialty:   Ophthalmology     Number of Visits Requested:   1    AMB POC HEMOGLOBIN A1C       Current Outpatient Medications   Medication Sig Dispense Refill    MAGNESIUM CITRATE PO Take by mouth      LANTUS SOLOSTAR 100 UNIT/ML injection pen Inject 80 Units into the skin daily 75 mL 3    HUMALOG KWIKPEN 100 UNIT/ML SOPN 20 units with meals with correction scale 3/50>150 (up to 75 units daily) 75 mL 3    metFORMIN (GLUCOPHAGE-XR) 500 MG extended release tablet Take 1 tablet by mouth Daily with supper 90 tablet 3    levothyroxine (SYNTHROID) 200 MCG tablet Take 1 tablet by mouth every morning (before breakfast) 90 tablet 3    levothyroxine (SYNTHROID) 50 MCG tablet Take 1 tablet by mouth every morning (before breakfast) 90 tablet 3    losartan (COZAAR) 25 MG tablet Take 1 tablet by mouth daily TAKE 1 TABLET DAILY 90 tablet 3    hydroCHLOROthiazide (HYDRODIURIL) 25 MG tablet Take 1 tablet by mouth daily 90 tablet 3    rosuvastatin (CRESTOR) 20 MG tablet Take 1 tablet by mouth daily 90 tablet 3    Insulin Pen Needle 31G X 6 MM MISC 4-5 times a day (E11.65) 450 each 3    Continuous Blood Gluc  (FREESTYLE JULIO 2 READER) VARUN Use as directed 1 each 0    Continuous Blood Gluc Sensor (FREESTYLE JULIO 2 SENSOR) MISC Change every 14 days 6 each 3    ascorbic acid (VITAMIN C) 250 MG tablet Take by mouth daily      Cholecalciferol 50 MCG (2000 UT) TABS Take by mouth      colchicine (COLCRYS) 0.6 MG tablet Two p.o. at onset of gout attack then one p.o. every hour to a maximum of five or N/V or diarrhea      Potassium Citrate ER 15 MEQ (1620 MG) TBCR Take 30 mEq by mouth 2 times daily      tamsulosin (FLOMAX) 0.4 MG capsule TAKE 1 CAPSULE DAILY       No current facility-administered medications for this visit.

## 2022-06-20 ENCOUNTER — TELEPHONE (OUTPATIENT)
Dept: ENDOCRINOLOGY | Age: 67
End: 2022-06-20

## 2022-06-20 DIAGNOSIS — E89.0 POSTSURGICAL HYPOTHYROIDISM: ICD-10-CM

## 2022-06-20 RX ORDER — LEVOTHYROXINE SODIUM 0.07 MG/1
75 TABLET ORAL
Qty: 90 TABLET | Refills: 3 | Status: SHIPPED | OUTPATIENT
Start: 2022-06-20 | End: 2022-06-30 | Stop reason: SDUPTHER

## 2022-06-20 RX ORDER — LEVOTHYROXINE SODIUM 0.2 MG/1
200 TABLET ORAL
Qty: 90 TABLET | Refills: 3 | Status: SHIPPED | OUTPATIENT
Start: 2022-06-20 | End: 2022-06-30 | Stop reason: SDUPTHER

## 2022-06-30 ENCOUNTER — TELEMEDICINE (OUTPATIENT)
Dept: FAMILY MEDICINE CLINIC | Facility: CLINIC | Age: 67
End: 2022-06-30
Payer: COMMERCIAL

## 2022-06-30 DIAGNOSIS — R79.89 TSH ELEVATION: Primary | ICD-10-CM

## 2022-06-30 DIAGNOSIS — E89.0 POSTSURGICAL HYPOTHYROIDISM: ICD-10-CM

## 2022-06-30 DIAGNOSIS — I10 ESSENTIAL HYPERTENSION: ICD-10-CM

## 2022-06-30 DIAGNOSIS — E78.2 MIXED HYPERLIPIDEMIA: ICD-10-CM

## 2022-06-30 PROCEDURE — 99442 PR PHYS/QHP TELEPHONE EVALUATION 11-20 MIN: CPT | Performed by: FAMILY MEDICINE

## 2022-06-30 RX ORDER — LEVOTHYROXINE SODIUM 0.07 MG/1
75 TABLET ORAL
Qty: 90 TABLET | Refills: 3 | Status: SHIPPED | OUTPATIENT
Start: 2022-06-30 | End: 2022-09-21 | Stop reason: SDUPTHER

## 2022-06-30 RX ORDER — ROSUVASTATIN CALCIUM 20 MG/1
20 TABLET, COATED ORAL DAILY
Qty: 90 TABLET | Refills: 3 | Status: SHIPPED | OUTPATIENT
Start: 2022-06-30 | End: 2022-09-21

## 2022-06-30 RX ORDER — LOSARTAN POTASSIUM 25 MG/1
25 TABLET ORAL DAILY
Qty: 90 TABLET | Refills: 3 | Status: SHIPPED | OUTPATIENT
Start: 2022-06-30 | End: 2022-09-21 | Stop reason: SDUPTHER

## 2022-06-30 RX ORDER — LEVOTHYROXINE SODIUM 0.2 MG/1
200 TABLET ORAL
Qty: 90 TABLET | Refills: 3 | Status: SHIPPED | OUTPATIENT
Start: 2022-06-30 | End: 2022-09-21 | Stop reason: SDUPTHER

## 2022-06-30 ASSESSMENT — PATIENT HEALTH QUESTIONNAIRE - PHQ9
2. FEELING DOWN, DEPRESSED OR HOPELESS: 0
SUM OF ALL RESPONSES TO PHQ QUESTIONS 1-9: 0
1. LITTLE INTEREST OR PLEASURE IN DOING THINGS: 0
SUM OF ALL RESPONSES TO PHQ QUESTIONS 1-9: 0
SUM OF ALL RESPONSES TO PHQ QUESTIONS 1-9: 0
SUM OF ALL RESPONSES TO PHQ9 QUESTIONS 1 & 2: 0
SUM OF ALL RESPONSES TO PHQ QUESTIONS 1-9: 0

## 2022-06-30 ASSESSMENT — ENCOUNTER SYMPTOMS
SHORTNESS OF BREATH: 0
NAUSEA: 0
VOMITING: 0
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
## 2022-07-28 RX ORDER — POTASSIUM CITRATE 15 MEQ/1
TABLET, EXTENDED RELEASE ORAL
Qty: 400 TABLET | Refills: 3 | OUTPATIENT
Start: 2022-07-28

## 2022-08-10 ENCOUNTER — NURSE TRIAGE (OUTPATIENT)
Dept: OTHER | Facility: CLINIC | Age: 67
End: 2022-08-10

## 2022-08-10 NOTE — TELEPHONE ENCOUNTER
Received a corey from Mrs Glenis Etienne  he states he fell a week ago  injured left leg  and  scraped left knee  and swelling from his knee down per Dr Chanel Valentino patient was advice to go to ED , patient refuse ,Dr. Chanel Valentino is 100 % book  for the next few days  patient was encourage to go to ED . Patient hang up call .
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
## 2022-08-24 ENCOUNTER — NURSE ONLY (OUTPATIENT)
Dept: FAMILY MEDICINE CLINIC | Facility: CLINIC | Age: 67
End: 2022-08-24
Payer: COMMERCIAL

## 2022-08-24 DIAGNOSIS — E78.2 MIXED HYPERLIPIDEMIA: ICD-10-CM

## 2022-08-24 DIAGNOSIS — C73 THYROID CANCER (HCC): ICD-10-CM

## 2022-08-24 DIAGNOSIS — I10 ESSENTIAL HYPERTENSION: ICD-10-CM

## 2022-08-24 PROCEDURE — 36415 COLL VENOUS BLD VENIPUNCTURE: CPT | Performed by: FAMILY MEDICINE

## 2022-08-25 LAB — TSH, 3RD GENERATION: 20.1 UIU/ML (ref 0.36–3.74)

## 2022-09-01 ENCOUNTER — TELEPHONE (OUTPATIENT)
Dept: ENDOCRINOLOGY | Age: 67
End: 2022-09-01

## 2022-09-01 NOTE — TELEPHONE ENCOUNTER
His level was abnormal.  How much levothyroxine has he been taking? Is he taking it correctly or missing any doses?

## 2022-09-01 NOTE — TELEPHONE ENCOUNTER
Pt had thyroid labs drawn a couple days ago with Dr. Charisma Mccullough. He would like . [facility] to review and advise.

## 2022-09-09 NOTE — TELEPHONE ENCOUNTER
Patient informed to take 275mcg daily at least 45 min to an hour before breakfast with only water. Patient expressed understanding.

## 2022-09-21 ENCOUNTER — OFFICE VISIT (OUTPATIENT)
Dept: ENDOCRINOLOGY | Age: 67
End: 2022-09-21
Payer: COMMERCIAL

## 2022-09-21 VITALS
WEIGHT: 290 LBS | SYSTOLIC BLOOD PRESSURE: 110 MMHG | DIASTOLIC BLOOD PRESSURE: 82 MMHG | BODY MASS INDEX: 42.95 KG/M2 | HEIGHT: 69 IN | OXYGEN SATURATION: 94 % | HEART RATE: 69 BPM

## 2022-09-21 DIAGNOSIS — E78.2 MIXED HYPERLIPIDEMIA: ICD-10-CM

## 2022-09-21 DIAGNOSIS — I10 ESSENTIAL HYPERTENSION: ICD-10-CM

## 2022-09-21 DIAGNOSIS — C73 THYROID CANCER (HCC): ICD-10-CM

## 2022-09-21 DIAGNOSIS — E89.0 POSTSURGICAL HYPOTHYROIDISM: ICD-10-CM

## 2022-09-21 LAB — HBA1C MFR BLD: 11.1 %

## 2022-09-21 PROCEDURE — 3046F HEMOGLOBIN A1C LEVEL >9.0%: CPT | Performed by: INTERNAL MEDICINE

## 2022-09-21 PROCEDURE — G8417 CALC BMI ABV UP PARAM F/U: HCPCS | Performed by: INTERNAL MEDICINE

## 2022-09-21 PROCEDURE — 3017F COLORECTAL CA SCREEN DOC REV: CPT | Performed by: INTERNAL MEDICINE

## 2022-09-21 PROCEDURE — 4004F PT TOBACCO SCREEN RCVD TLK: CPT | Performed by: INTERNAL MEDICINE

## 2022-09-21 PROCEDURE — 1123F ACP DISCUSS/DSCN MKR DOCD: CPT | Performed by: INTERNAL MEDICINE

## 2022-09-21 PROCEDURE — 99214 OFFICE O/P EST MOD 30 MIN: CPT | Performed by: INTERNAL MEDICINE

## 2022-09-21 PROCEDURE — G8427 DOCREV CUR MEDS BY ELIG CLIN: HCPCS | Performed by: INTERNAL MEDICINE

## 2022-09-21 PROCEDURE — 83036 HEMOGLOBIN GLYCOSYLATED A1C: CPT | Performed by: INTERNAL MEDICINE

## 2022-09-21 PROCEDURE — 2022F DILAT RTA XM EVC RTNOPTHY: CPT | Performed by: INTERNAL MEDICINE

## 2022-09-21 RX ORDER — INSULIN GLARGINE 100 [IU]/ML
50 INJECTION, SOLUTION SUBCUTANEOUS 2 TIMES DAILY
Qty: 90 ML | Refills: 3 | Status: SHIPPED | OUTPATIENT
Start: 2022-09-21 | End: 2022-11-02 | Stop reason: ALTCHOICE

## 2022-09-21 RX ORDER — LEVOTHYROXINE SODIUM 0.07 MG/1
75 TABLET ORAL
Qty: 90 TABLET | Refills: 3 | Status: SHIPPED | OUTPATIENT
Start: 2022-09-21 | End: 2022-11-02 | Stop reason: ALTCHOICE

## 2022-09-21 RX ORDER — FLASH GLUCOSE SCANNING READER
EACH MISCELLANEOUS
Qty: 1 EACH | Refills: 0
Start: 2022-09-21 | End: 2022-11-02 | Stop reason: ALTCHOICE

## 2022-09-21 RX ORDER — LOSARTAN POTASSIUM 25 MG/1
25 TABLET ORAL DAILY
Qty: 90 TABLET | Refills: 3 | Status: SHIPPED | OUTPATIENT
Start: 2022-09-21

## 2022-09-21 RX ORDER — ROSUVASTATIN CALCIUM 20 MG/1
20 TABLET, COATED ORAL DAILY
Qty: 90 TABLET | Refills: 3 | Status: SHIPPED | OUTPATIENT
Start: 2022-09-21 | End: 2022-11-02

## 2022-09-21 RX ORDER — HYDROCHLOROTHIAZIDE 25 MG/1
25 TABLET ORAL DAILY
Qty: 90 TABLET | Refills: 3 | Status: SHIPPED | OUTPATIENT
Start: 2022-09-21

## 2022-09-21 RX ORDER — FLASH GLUCOSE SENSOR
KIT MISCELLANEOUS
Qty: 6 EACH | Refills: 3
Start: 2022-09-21 | End: 2022-11-02 | Stop reason: ALTCHOICE

## 2022-09-21 RX ORDER — METFORMIN HYDROCHLORIDE 500 MG/1
500 TABLET, EXTENDED RELEASE ORAL
Qty: 90 TABLET | Refills: 3 | Status: SHIPPED | OUTPATIENT
Start: 2022-09-21 | End: 2022-11-02 | Stop reason: ALTCHOICE

## 2022-09-21 RX ORDER — INSULIN LISPRO 100 [IU]/ML
INJECTION, SOLUTION INTRAVENOUS; SUBCUTANEOUS
Qty: 90 ML | Refills: 3 | Status: SHIPPED | OUTPATIENT
Start: 2022-09-21 | End: 2022-11-02 | Stop reason: ALTCHOICE

## 2022-09-21 RX ORDER — LEVOTHYROXINE SODIUM 0.2 MG/1
200 TABLET ORAL
Qty: 90 TABLET | Refills: 3 | Status: SHIPPED | OUTPATIENT
Start: 2022-09-21 | End: 2022-11-02 | Stop reason: SDUPTHER

## 2022-09-21 ASSESSMENT — ENCOUNTER SYMPTOMS
CONSTIPATION: 0
DIARRHEA: 0
SHORTNESS OF BREATH: 0

## 2022-09-21 NOTE — PROGRESS NOTES
Logan Go MD, [facility]  [address], [address], [address]  Phone [phone]  Facsimile [phone]          Rosaloi Palomino is a 75 y.o. male seen 9/21/2022 for follow-up of type 2 diabetes mellitus, history of thyroid cancer and hypothyroidism        ASSESSMENT AND PLAN:    1. Uncontrolled type 2 diabetes mellitus with microalbuminuria, with long-term current use of insulin (HCC)  His glycemic control continues to deteriorate and his hemoglobin A1c is markedly elevated above goal less than 7. He has not been taking his prandial Humalog correctly. I will increase his Lantus and Humalog as below I again instructed him on how to take his insulin properly. I think he would benefit from a Freestyle Morales 2 continuous glucose monitor. Urine microalbumin positive 3/2022 (he is on an ARB). Eye exam overdue and I will again refer him to ophthalmology. I will have him follow-up with one of our APCs in 6 weeks. - LANTUS SOLOSTAR 100 UNIT/ML injection pen; Inject 50 Units into the skin in the morning and at bedtime  Dispense: 90 mL; Refill: 3  - HUMALOG KWIKPEN 100 UNIT/ML SOPN; 15 units with breakfast and 25 units with lunch and supper with correction scale 3/50>150 (up to 100 units daily)  Dispense: 90 mL; Refill: 3  - metFORMIN (GLUCOPHAGE-XR) 500 MG extended release tablet; Take 1 tablet by mouth Daily with supper  Dispense: 90 tablet; Refill: 3  - Insulin Pen Needle 31G X 6 MM MISC; 4-5 times a day (E11.65)  Dispense: 450 each; Refill: 3  - Continuous Blood Gluc  (FREESTYLE MORALES 2 READER) VARUN; Use as directed  Dispense: 1 each; Refill: 0  - Continuous Blood Gluc Sensor (FREESTYLE MORALES 2 SENSOR) MISC; Change every 14 days  Dispense: 6 each; Refill: 3    2. Thyroid cancer [facility])  He reports a history of thyroid cancer status post total thyroidectomy in 2008 in [place], status post CHILDERS-131 (dose unknown).   He then had a recurrence in right lateral cervical lymph nodes status post right lateral neck dissection in . Neck ultrasound negative 2021. Thyroglobulin level negative 2021. At this point he appears to be disease-free. I will monitor his thyroglobulin level once a year. 3. Postsurgical hypothyroidism  He is under replaced, but he has not been taking the correct dose of levothyroxine. I will have him increase his levothyroxine to 275 mcg daily. His TSH can be repeated at his follow-up visit. - levothyroxine (SYNTHROID) 200 MCG tablet; Take 1 tablet by mouth every morning (before breakfast) Total daily dose 275 mcg  Dispense: 90 tablet; Refill: 3  - levothyroxine (SYNTHROID) 75 MCG tablet; Take 1 tablet by mouth every morning (before breakfast) Total daily dose 275 mcg  Dispense: 90 tablet; Refill: 3    4. Essential hypertension  Followed by another physician. - losartan (COZAAR) 25 MG tablet; Take 1 tablet by mouth daily TAKE 1 TABLET DAILY  Dispense: 90 tablet; Refill: 3  - hydroCHLOROthiazide (HYDRODIURIL) 25 MG tablet; Take 1 tablet by mouth daily  Dispense: 90 tablet; Refill: 3    5. Mixed hyperlipidemia  Lipids not at goal 3/2022. I will switch him from pravastatin to rosuvastatin. - rosuvastatin (CRESTOR) 20 MG tablet; Take 1 tablet by mouth daily  Dispense: 90 tablet; Refill: 3      Follow-up and Dispositions    Return 6 weeks with Maddison Simms. HISTORY OF PRESENT ILLNESS:    DIABETES MELLITUS     Diagnosis: Type 2 diabetes mellitus diagnosed 15-20 years ago. Diet: He occasionally eats sweets. He snacks before bed occasionally (crackers, nuts, occasionally ice cream and chocolate). Exercise: No regular exercise. Monitorin times a day. Blood Glucose: By recall: Fasting High 200s to 300s, after supper 300s to 400s. Hypoglycemia: No episodes.      Hemoglobin A1c:  10/29/2020: 8.3.  2021: 8.5.  2021: 9.3.  2021: 9.1.  2022: 9.2.  3/2/2022: 10.5.  6/15/2022: 9.1.  9/21/2022: 11.1. Microalbumin/Nephropathy:  10/8/2020: Creatinine 1.48 (GFR 49). 10/29/2020: Creatinine 1.71 (GFR 41), urine microalbumin-.0.  2/2/2021: Creatinine 1.31 (GFR 56). 5/7/2021: Creatinine 1.40 (GFR 52). 6/15/2021: Creatinine 3.32 (GFR 20).  6/16/2021: Creatinine 3.36 (GFR 20). 6/17/2021: Creatinine 1.95 (GFR 37). 11/11/2021: Creatinine 1.81 (GFR 38), urine microalbumin/creatinine 91.  3/2/2022: Creatinine 1.83 (GFR 40), urine microalbumin-.0.  8/10/2022: Creatinine 1.8 (GFR 41). Neuropathy: He has a history of diabetic peripheral neuropathy and he occasionally has burning, numbness, tingling of feet. Retinopathy: Eye exam overdue - no retinopathy. Lipids:  10/29/2020: Total cholesterol 157, triglycerides 142, HDL 46, LDL 86, VLDL 25.  2/5/2021: Total cholesterol 149, triglycerides 212, HDL 43, LDL 71, VLDL 35.  11/11/2021: Total cholesterol 180, triglycerides 156, HDL 39, , VLDL 28.  3/2/2022: Total cholesterol 232, triglycerides 433, HDL 42, , VLDL 75. Prior Treatment: He has a pancreatic cyst but no history of pancreatitis. He did not think Trulicity was effective so he stopped it on his own. THYROID CANCER     Presentation: Bilateral thyroid cancer status post total thyroidectomy 2008, status post CHILDERS-131 2008 in [place]. He had a recurrence in right cervical lymph nodes in 2010 status post right lateral neck dissection. Surgical complications: None. Current symptoms: Denies neck lumps, lymphadenopathy, dysphagia, hoarseness. Imaging:   Neck ultrasound 2/11/2021: No pathologic adenopathy. Labs:  10/29/2020: TSH 0.031.  2/2/2021: TSH 0.005, free T4 2.22, Tg <0.1, Tg Ab <1.0.  3/26/2021: TSH <0.005, free T4 2.54.  6/16/2021: TSH <0.005.  11/11/2021: TSH 1.420, Tg <0.1, Tg Ab <1.0.  3/2/2022: TSH 9.550.  6/15/2022: TSH 4.20, free T4 1.3.  8/24/2022: TSH 20.100.         THYROID DISEASE X 6 MM MISC 4-5 times a day (E11.65) 450 each 3    Continuous Blood Gluc  (FREESTYLE JULIO 2 READER) VARUN Use as directed 1 each 0    Continuous Blood Gluc Sensor (FREESTYLE JULIO 2 SENSOR) MISC Change every 14 days 6 each 3    levothyroxine (SYNTHROID) 200 MCG tablet Take 1 tablet by mouth every morning (before breakfast) Total daily dose 275 mcg 90 tablet 3    levothyroxine (SYNTHROID) 75 MCG tablet Take 1 tablet by mouth every morning (before breakfast) Total daily dose 275 mcg 90 tablet 3    losartan (COZAAR) 25 MG tablet Take 1 tablet by mouth daily TAKE 1 TABLET DAILY 90 tablet 3    hydroCHLOROthiazide (HYDRODIURIL) 25 MG tablet Take 1 tablet by mouth daily 90 tablet 3    rosuvastatin (CRESTOR) 20 MG tablet Take 1 tablet by mouth daily 90 tablet 3    ascorbic acid (VITAMIN C) 250 MG tablet Take by mouth daily      Cholecalciferol 50 MCG (2000 UT) TABS Take by mouth      colchicine (COLCRYS) 0.6 MG tablet Two p.o. at onset of gout attack then one p.o. every hour to a maximum of five or N/V or diarrhea      tamsulosin (FLOMAX) 0.4 MG capsule TAKE 1 CAPSULE DAILY       No current facility-administered medications for this visit.

## 2022-11-02 ENCOUNTER — OFFICE VISIT (OUTPATIENT)
Dept: ENDOCRINOLOGY | Age: 67
End: 2022-11-02
Payer: COMMERCIAL

## 2022-11-02 VITALS
DIASTOLIC BLOOD PRESSURE: 82 MMHG | BODY MASS INDEX: 41.64 KG/M2 | HEART RATE: 80 BPM | SYSTOLIC BLOOD PRESSURE: 120 MMHG | WEIGHT: 282 LBS | OXYGEN SATURATION: 93 %

## 2022-11-02 DIAGNOSIS — Z79.4 TYPE 2 DIABETES MELLITUS WITH HYPERGLYCEMIA, WITH LONG-TERM CURRENT USE OF INSULIN (HCC): Primary | ICD-10-CM

## 2022-11-02 DIAGNOSIS — N18.31 STAGE 3A CHRONIC KIDNEY DISEASE (CKD) (HCC): ICD-10-CM

## 2022-11-02 DIAGNOSIS — E89.0 POSTSURGICAL HYPOTHYROIDISM: ICD-10-CM

## 2022-11-02 DIAGNOSIS — Z79.4 TYPE 2 DIABETES MELLITUS WITH HYPERGLYCEMIA, WITH LONG-TERM CURRENT USE OF INSULIN (HCC): ICD-10-CM

## 2022-11-02 DIAGNOSIS — E11.65 TYPE 2 DIABETES MELLITUS WITH HYPERGLYCEMIA, WITH LONG-TERM CURRENT USE OF INSULIN (HCC): ICD-10-CM

## 2022-11-02 DIAGNOSIS — R80.9 ALBUMINURIA: ICD-10-CM

## 2022-11-02 DIAGNOSIS — E11.42 DIABETIC POLYNEUROPATHY ASSOCIATED WITH TYPE 2 DIABETES MELLITUS (HCC): ICD-10-CM

## 2022-11-02 DIAGNOSIS — E11.65 TYPE 2 DIABETES MELLITUS WITH HYPERGLYCEMIA, WITH LONG-TERM CURRENT USE OF INSULIN (HCC): Primary | ICD-10-CM

## 2022-11-02 LAB
25(OH)D3 SERPL-MCNC: 41.2 NG/ML (ref 30–100)
ALBUMIN SERPL-MCNC: 3.8 G/DL (ref 3.2–4.6)
ALBUMIN/GLOB SERPL: 1.2 {RATIO} (ref 0.4–1.6)
ALP SERPL-CCNC: 77 U/L (ref 50–136)
ALT SERPL-CCNC: 35 U/L (ref 12–65)
ANION GAP SERPL CALC-SCNC: 6 MMOL/L (ref 2–11)
AST SERPL-CCNC: 16 U/L (ref 15–37)
BILIRUB SERPL-MCNC: 0.9 MG/DL (ref 0.2–1.1)
BUN SERPL-MCNC: 31 MG/DL (ref 8–23)
CALCIUM SERPL-MCNC: 9.2 MG/DL (ref 8.3–10.4)
CHLORIDE SERPL-SCNC: 101 MMOL/L (ref 101–110)
CO2 SERPL-SCNC: 28 MMOL/L (ref 21–32)
CREAT SERPL-MCNC: 2 MG/DL (ref 0.8–1.5)
CREAT UR-MCNC: 186 MG/DL
GLOBULIN SER CALC-MCNC: 3.3 G/DL (ref 2.8–4.5)
GLUCOSE SERPL-MCNC: 310 MG/DL (ref 65–100)
MICROALBUMIN UR-MCNC: 49.1 MG/DL
MICROALBUMIN/CREAT UR-RTO: 264 MG/G
POTASSIUM SERPL-SCNC: 3.9 MMOL/L (ref 3.5–5.1)
PROT SERPL-MCNC: 7.1 G/DL (ref 6.3–8.2)
SODIUM SERPL-SCNC: 135 MMOL/L (ref 133–143)
T4 FREE SERPL-MCNC: 1 NG/DL (ref 0.78–1.46)
TSH, 3RD GENERATION: 37.6 UIU/ML (ref 0.36–3.74)

## 2022-11-02 PROCEDURE — 1036F TOBACCO NON-USER: CPT | Performed by: DIETITIAN, REGISTERED

## 2022-11-02 PROCEDURE — 99215 OFFICE O/P EST HI 40 MIN: CPT | Performed by: DIETITIAN, REGISTERED

## 2022-11-02 PROCEDURE — 3074F SYST BP LT 130 MM HG: CPT | Performed by: DIETITIAN, REGISTERED

## 2022-11-02 PROCEDURE — G8417 CALC BMI ABV UP PARAM F/U: HCPCS | Performed by: DIETITIAN, REGISTERED

## 2022-11-02 PROCEDURE — 3017F COLORECTAL CA SCREEN DOC REV: CPT | Performed by: DIETITIAN, REGISTERED

## 2022-11-02 PROCEDURE — 3078F DIAST BP <80 MM HG: CPT | Performed by: DIETITIAN, REGISTERED

## 2022-11-02 PROCEDURE — 1123F ACP DISCUSS/DSCN MKR DOCD: CPT | Performed by: DIETITIAN, REGISTERED

## 2022-11-02 PROCEDURE — 2022F DILAT RTA XM EVC RTNOPTHY: CPT | Performed by: DIETITIAN, REGISTERED

## 2022-11-02 PROCEDURE — G8484 FLU IMMUNIZE NO ADMIN: HCPCS | Performed by: DIETITIAN, REGISTERED

## 2022-11-02 PROCEDURE — G8427 DOCREV CUR MEDS BY ELIG CLIN: HCPCS | Performed by: DIETITIAN, REGISTERED

## 2022-11-02 PROCEDURE — 3046F HEMOGLOBIN A1C LEVEL >9.0%: CPT | Performed by: DIETITIAN, REGISTERED

## 2022-11-02 RX ORDER — FLASH GLUCOSE SCANNING READER
1 EACH MISCELLANEOUS 4 TIMES DAILY
Qty: 1 EACH | Refills: 3 | Status: SHIPPED | OUTPATIENT
Start: 2022-11-02

## 2022-11-02 RX ORDER — INSULIN GLARGINE 100 [IU]/ML
50 INJECTION, SOLUTION SUBCUTANEOUS 2 TIMES DAILY
Qty: 90 ML | Refills: 11
Start: 2022-11-02

## 2022-11-02 RX ORDER — GLUCAGON INJECTION, SOLUTION 1 MG/.2ML
1 INJECTION, SOLUTION SUBCUTANEOUS AS NEEDED
Qty: 0.4 ML | Refills: 11 | Status: SHIPPED | OUTPATIENT
Start: 2022-11-02

## 2022-11-02 RX ORDER — FINERENONE 10 MG/1
10 TABLET, FILM COATED ORAL DAILY
Qty: 30 TABLET | Refills: 11 | Status: SHIPPED | OUTPATIENT
Start: 2022-11-02

## 2022-11-02 RX ORDER — LEVOTHYROXINE SODIUM 0.2 MG/1
200 TABLET ORAL
Qty: 90 TABLET | Refills: 11 | Status: SHIPPED | OUTPATIENT
Start: 2022-11-02

## 2022-11-02 RX ORDER — FLASH GLUCOSE SENSOR
2 KIT MISCELLANEOUS
Qty: 1 EACH | Refills: 3
Start: 2022-11-02

## 2022-11-02 RX ORDER — INSULIN LISPRO 100 [IU]/ML
INJECTION, SOLUTION INTRAVENOUS; SUBCUTANEOUS
Qty: 90 ML | Refills: 11 | Status: SHIPPED | OUTPATIENT
Start: 2022-11-02

## 2022-11-02 RX ORDER — TIRZEPATIDE 2.5 MG/.5ML
2.5 INJECTION, SOLUTION SUBCUTANEOUS
Qty: 2 ML | Refills: 3 | Status: SHIPPED | OUTPATIENT
Start: 2022-11-02

## 2022-11-02 RX ORDER — LEVOTHYROXINE SODIUM 0.1 MG/1
100 TABLET ORAL DAILY
Qty: 90 TABLET | Refills: 11 | Status: SHIPPED | OUTPATIENT
Start: 2022-11-02

## 2022-11-02 ASSESSMENT — ENCOUNTER SYMPTOMS
DIARRHEA: 0
SHORTNESS OF BREATH: 0
WHEEZING: 0
TROUBLE SWALLOWING: 0
NAUSEA: 0
VOICE CHANGE: 0
VOMITING: 0
ABDOMINAL PAIN: 0
CONSTIPATION: 1
BACK PAIN: 0
COUGH: 0

## 2022-11-02 NOTE — PROGRESS NOTES
[facility]   [facility]   [facility]    Yesenia SherylARUNA walton  [address], [address] [facility], [address] [place] [address] Priscila  Phone [phone]  Facsimile [phone]      Reason for visit: Kalyn AWAN (1955) is here for follow up of Type 2 Diabetes Mellitus. ASSESSMENT AND PLAN:      1. Type 2 diabetes mellitus with hyperglycemia, with long-term current use of insulin ([facility])  A1c is 11.1%. Glycemic control is suboptimal.  Patient struggles to take his insulin injections daily and often forgets mealtime insulin. He takes 1 dose of mealtime insulin injection daily and it varies which meal he takes it with. --- Reinforced the importance of taking his mealtime insulin and insulin injection at the right dose and right time. --- Glucagon ordered. --- Instructed on the rule of 15.  --- Continue insulin as previously prescribed. --- Start Mounjaro 2.5 mg weekly. --- D/c Metformin due to decreased GFR and rise in Creatinine.    - blood glucose test strips (ASCENSIA AUTODISC VI;ONE TOUCH ULTRA TEST VI) strip; 1 each by In Vitro route daily Check BG 4 times daily. Patient needs accucheck  Dispense: 200 each; Refill: 11  - C-Peptide; Future  - IA-2 Antibody; Future  - Glutamic Acid Decarboxylase; Future  - Zinc Transporter 8 AB; Future  - Comprehensive Metabolic Panel; Future  - Microalbumin / Creatinine Urine Ratio; Future  - Vitamin D 25 Hydroxy; Future  - MOUNJARO 2.5 MG/0.5ML SOPN SC injection; Inject 0.5 mLs into the skin every 7 days  Dispense: 2 mL; Refill: 3  - [facility] Diabetic [facility]  - Continuous Blood Gluc  (FREESTYLE JULIO 2 READER) VARUN; 1 each by Does not apply route 4 times daily  Dispense: 1 each;  Refill: 3  - GVOKE HYPOPEN 2-PACK 1 MG/0.2ML SOAJ; Inject 1 mg into the skin as needed (as needed for BG less than 50 mg/dL)  Dispense: 0.4 mL; Refill: 11  - Continuous Blood Gluc Sensor (FREESTYLE JULIO 2 SENSOR) MISC; 2 each by Does not apply route 4 times daily (before meals and nightly)  Dispense: 1 each; Refill: 3  - LANTUS SOLOSTAR 100 UNIT/ML injection pen; Inject 50 Units into the skin 2 times daily Titrate by 2 units every 4th day to fasting BG goal of  mg/dL. Max Daily Dose: 150 units  Dispense: 90 mL; Refill: 11  - HUMALOG KWIKPEN 100 UNIT/ML SOPN; 15 units with breakfast and 25 units with lunch and supper with correction scale 3/50>150 (up to 100 units daily)  Dispense: 90 mL; Refill: 11    - blood glucose test strips (ASCENSIA AUTODISC VI;ONE TOUCH ULTRA TEST VI) strip; 1 each by In Vitro route daily Check BG 4 times daily. Patient needs accucheck  Dispense: 200 each; Refill: 11  - C-Peptide; Future  - IA-2 Antibody; Future  - Glutamic Acid Decarboxylase; Future  - Zinc Transporter 8 AB; Future  - Comprehensive Metabolic Panel; Future  - Microalbumin / Creatinine Urine Ratio; Future  - Vitamin D 25 Hydroxy; Future  - MOUNJARO 2.5 MG/0.5ML SOPN SC injection; Inject 0.5 mLs into the skin every 7 days  Dispense: 2 mL; Refill: 3  - [facility] Diabetic Treatment  - Continuous Blood Gluc  (FREESTYLE JULIO 2 READER) VARUN; 1 each by Does not apply route 4 times daily  Dispense: 1 each; Refill: 3  - GVOKE HYPOPEN 2-PACK 1 MG/0.2ML SOAJ; Inject 1 mg into the skin as needed (as needed for BG less than 50 mg/dL)  Dispense: 0.4 mL; Refill: 11  - Continuous Blood Gluc Sensor (FREESTYLE JULIO 2 SENSOR) MISC; 2 each by Does not apply route 4 times daily (before meals and nightly)  Dispense: 1 each; Refill: 3  - LANTUS SOLOSTAR 100 UNIT/ML injection pen; Inject 50 Units into the skin 2 times daily Titrate by 2 units every 4th day to fasting BG goal of  mg/dL. Max Daily Dose: 150 units  Dispense: 90 mL; Refill: 11  - HUMALOG KWIKPEN 100 UNIT/ML SOPN; 15 units with breakfast and 25 units with lunch and supper with correction scale 3/50>150 (up to 100 units daily)  Dispense: 90 mL; Refill: 11    2. Essential hypertension  Followed by another physician.   BP Readings from Last 3 Encounters:   11/02/22 120/82   09/21/22 110/82   06/15/22 (!) 142/86     - losartan (COZAAR) 25 MG tablet; Take 1 tablet by mouth daily TAKE 1 TABLET DAILY  Dispense: 90 tablet; Refill: 3  - hydroCHLOROthiazide (HYDRODIURIL) 25 MG tablet; Take 1 tablet by mouth daily  Dispense: 90 tablet; Refill: 3    3. Mixed hyperlipidemia   above goal of less than 70. Tolerating rosuvastatin 20 mg daily. - rosuvastatin (CRESTOR) 20 MG tablet; Take 1 tablet by mouth daily  Dispense: 90 tablet; Refill: 3    4. Postsurgical hypothyroidism  --- Increase dose to 300 mcg every morning. --- Recheck TSH/Free T4 in 6-8 weeks. - T4, Free; Future  - TSH; Future  - levothyroxine (SYNTHROID) 100 MCG tablet; Take 1 tablet by mouth daily  Dispense: 90 tablet; Refill: 11  - levothyroxine (SYNTHROID) 200 MCG tablet; Take 1 tablet by mouth every morning (before breakfast) Total daily dose 275 mcg  Dispense: 90 tablet; Refill: 11    5. Stage 3a chronic kidney disease (CKD) ([facility] [address]  Patient will benefit from SGLT-2 Inhibitor once A1C is less than 8%. --- Start Ludmila Cutter daily.  - KERENDIA 10 MG TABS; Take 10 mg by mouth daily  Dispense: 30 tablet; Refill: 11    6. Albuminuria  - KERENDIA 10 MG TABS; Take 10 mg by mouth daily  Dispense: 30 tablet; Refill: 11    7. Diabetic polyneuropathy associated with type 2 diabetes mellitus ([facility] [address]  --- Will benefit from neuropathic agent. 8. Thyroid cancer [facility])  He reports a history of thyroid cancer status post total thyroidectomy in 2008 in [place], status post CHILDERS-131 (dose unknown). He then had a recurrence in right lateral cervical lymph nodes status post right lateral neck dissection in 2010. Neck ultrasound negative 2/2021. Thyroglobulin level negative 11/2021. At this point he appears to be disease-free. I will monitor his thyroglobulin level once a year.         HISTORY OF PRESENT ILLNESS:    DIABETES MELLITUS     Diagnosis: Type 2 diabetes mellitus diagnosed 15-20 years ago. Current Medications: Metformin  mg with breakfast, Humalog taking once a day - 30-35 units daily, Lantus 50 in AM and 50 at night. Diet: He occasionally eats sweets. He snacks before bed occasionally (crackers, nuts, occasionally ice cream and chocolate). Exercise: No regular exercise. Monitorin times a day. Blood Glucose: By recall: Fasting High 200s to 300s, after supper 300s to 400s. Hypoglycemia: No episodes. Hemoglobin A1c:  10/29/2020: 8.3.  2021: 8.5.  2021: 9.3.  2021: 9.1.  2022: 9.2.  3/2/2022: 10.5.  6/15/2022: 9.1.  2022: 11.1%     Microalbumin/Nephropathy:  10/8/2020: Creatinine 1.48 (GFR 49). 10/29/2020: Creatinine 1.71 (GFR 41), urine microalbumin-.0.  2021: Creatinine 1.31 (GFR 56). 2021: Creatinine 1.40 (GFR 52). 6/15/2021: Creatinine 3.32 (GFR 20).  2021: Creatinine 3.36 (GFR 20). 2021: Creatinine 1.95 (GFR 37). 2021: Creatinine 1.81 (GFR 38), urine microalbumin/creatinine 91.  3/2/2022: Creatinine 1.83 (GFR 40), urine microalbumin-.0.  8/10/2022: Creatinine 1.8 (GFR 41). 2022  Cr 2.00, GFR 36, microalbumin/Cr ratio 264    Neuropathy: He has a history of diabetic peripheral neuropathy and he occasionally has burning, numbness, tingling of feet. Retinopathy: Eye exam overdue - no retinopathy. Lipids:  Declines statin due to concern that it interfers with glucosamine chondritin sulfate  10/29/2020: Total cholesterol 157, triglycerides 142, HDL 46, LDL 86, VLDL 25.  2021: Total cholesterol 149, triglycerides 212, HDL 43, LDL 71, VLDL 35.  2021: Total cholesterol 180, triglycerides 156, HDL 39, , VLDL 28.  3/2/2022: Total cholesterol 232, triglycerides 433, HDL 42, , VLDL 75. Prior Treatment: He has a pancreatic cyst but no history of pancreatitis. He did not think Trulicity was effective so he stopped it on his own.       THYROID CANCER     Presentation: Bilateral thyroid cancer status post total thyroidectomy 2008, status post CHILDERS-131 2008 in [place]. He had a recurrence in right cervical lymph nodes in 2010 status post right lateral neck dissection. Surgical complications: None. Current symptoms: Denies neck lumps, lymphadenopathy, dysphagia, hoarseness. Imaging:   Neck ultrasound 2/11/2021: No pathologic adenopathy. Labs:  10/29/2020: TSH 0.031.  2/2/2021: TSH 0.005, free T4 2.22, Tg <0.1, Tg Ab <1.0.  3/26/2021: TSH <0.005, free T4 2.54.  6/16/2021: TSH <0.005.  11/11/2021: TSH 1.420, Tg <0.1, Tg Ab <1.0.  3/2/2022: TSH 9.550.  6/15/2022: TSH 4.20, free T4 1.3.  8/24/2022: TSH 20.100.  11/02/2022 TSH 37.600, Free T4: 1.0    Vitamin D:  11/02/2022 41.2 ()        THYROID DISEASE     Presentation/Diagnosis: Postsurgical hypothyroidism     Symptoms: See review of systems. Treatment: Takes generic in AM with other medications, including vitamins. /82 (Site: Left Upper Arm, Position: Sitting)   Pulse 80   Wt 282 lb (127.9 kg)   SpO2 93%   BMI 41.64 kg/m²     Weight Trends: Wt Readings from Last 3 Encounters:   11/02/22 282 lb (127.9 kg)   09/21/22 290 lb (131.5 kg)   06/15/22 288 lb (130.6 kg)       Allergies and Medications: Reviewed in Chart    Review of Systems   Constitutional:  Positive for fatigue and unexpected weight change (lost 9 lbs in past month). Negative for appetite change and diaphoresis. HENT:  Negative for trouble swallowing and voice change. Eyes:  Positive for visual disturbance (blurry). Respiratory:  Negative for cough, shortness of breath and wheezing. Cardiovascular:  Negative for chest pain, palpitations and leg swelling. Gastrointestinal:  Positive for constipation. Negative for abdominal pain, diarrhea, nausea and vomiting. Endocrine: Positive for polydipsia. Negative for cold intolerance, heat intolerance, polyphagia and polyuria.    Genitourinary: Negative for difficulty urinating and frequency. Musculoskeletal:  Negative for arthralgias, back pain and myalgias. Skin:  Negative for pallor. Neurological:  Positive for numbness (numbness burning tingling in hands and feet). Negative for dizziness, tremors, weakness and headaches. Hematological:  Negative for adenopathy. Psychiatric/Behavioral:  Negative for dysphoric mood and sleep disturbance. The patient is not nervous/anxious. Physical Exam  Constitutional:       General: He is not in acute distress. Appearance: Normal appearance. He is normal weight. He is not ill-appearing. HENT:      Head: Normocephalic. Cardiovascular:      Rate and Rhythm: Normal rate and regular rhythm. Pulses: Normal pulses. Pulmonary:      Effort: No respiratory distress. Breath sounds: Normal breath sounds. No wheezing or rhonchi. Chest:      Chest wall: No tenderness. Abdominal:      General: There is no distension. Palpations: Abdomen is soft. Tenderness: There is no abdominal tenderness. There is no guarding. Musculoskeletal:         General: No swelling, tenderness or signs of injury. Cervical back: Neck supple. No tenderness. Right lower leg: No edema. Left lower leg: No edema. Feet:      Right foot:      Skin integrity: Skin integrity normal. No ulcer. Left foot:      Skin integrity: Skin integrity normal. No ulcer. Lymphadenopathy:      Cervical: No cervical adenopathy. Skin:     General: Skin is warm and dry. Findings: No erythema or rash. Neurological:      Mental Status: He is alert. Motor: No weakness.    Psychiatric:         Mood and Affect: Mood normal.         Behavior: Behavior normal.       Orders Placed This Encounter   Procedures    T4, Free     Standing Status:   Future     Number of Occurrences:   1     Standing Expiration Date:   11/2/2023    TSH     Standing Status:   Future     Number of Occurrences:   1     Standing Expiration Date:   11/2/2023    C-Peptide     Standing Status:   Future     Number of Occurrences:   1     Standing Expiration Date:   11/2/2023    IA-2 Antibody     Standing Status:   Future     Number of Occurrences:   1     Standing Expiration Date:   11/2/2023    Glutamic Acid Decarboxylase     Standing Status:   Future     Number of Occurrences:   1     Standing Expiration Date:   11/2/2023    Zinc Transporter 8 AB     Standing Status:   Future     Number of Occurrences:   1     Standing Expiration Date:   11/2/2023    Comprehensive Metabolic Panel     Standing Status:   Future     Number of Occurrences:   1     Standing Expiration Date:   11/2/2023    Microalbumin / Creatinine Urine Ratio     Standing Status:   Future     Number of Occurrences:   1     Standing Expiration Date:   11/2/2023    Vitamin D 25 Hydroxy     Standing Status:   Future     Number of Occurrences:   1     Standing Expiration Date:   11/2/2023   [facility]     Referral Priority:   Routine     Referral Type:   Eval and Treat     Referral Reason:   Specialty Services Required     Number of Visits Requested:   1       Current Outpatient Medications   Medication Sig Dispense Refill    blood glucose test strips (ASCENSIA AUTODISC VI;ONE TOUCH ULTRA TEST VI) strip 1 each by In Vitro route daily Check BG 4 times daily.  Patient needs accucheck 200 each 11    MOUNJARO 2.5 MG/0.5ML SOPN SC injection Inject 0.5 mLs into the skin every 7 days 2 mL 3    Continuous Blood Gluc  (FREESTYLE JULIO 2 READER) VARUN 1 each by Does not apply route 4 times daily 1 each 3    GVOKE HYPOPEN 2-PACK 1 MG/0.2ML SOAJ Inject 1 mg into the skin as needed (as needed for BG less than 50 mg/dL) 0.4 mL 11    levothyroxine (SYNTHROID) 100 MCG tablet Take 1 tablet by mouth daily 90 tablet 11    KERENDIA 10 MG TABS Take 10 mg by mouth daily 30 tablet 11    Continuous Blood Gluc Sensor (FREESTYLE JULIO 2 SENSOR) MISC 2 each by Does not apply route 4 times daily (before meals and nightly) 1 each 3    LANTUS SOLOSTAR 100 UNIT/ML injection pen Inject 50 Units into the skin 2 times daily Titrate by 2 units every 4th day to fasting BG goal of  mg/dL. Max Daily Dose: 150 units 90 mL 11    HUMALOG KWIKPEN 100 UNIT/ML SOPN 15 units with breakfast and 25 units with lunch and supper with correction scale 3/50>150 (up to 100 units daily) 90 mL 11    levothyroxine (SYNTHROID) 200 MCG tablet Take 1 tablet by mouth every morning (before breakfast) Total daily dose 275 mcg 90 tablet 11    Glucosamine-Chondroitin (GLUCOSAMINE CHONDR COMPLEX PO) Take by mouth      Insulin Pen Needle 31G X 6 MM MISC 4-5 times a day (E11.65) 450 each 3    losartan (COZAAR) 25 MG tablet Take 1 tablet by mouth daily TAKE 1 TABLET DAILY 90 tablet 3    hydroCHLOROthiazide (HYDRODIURIL) 25 MG tablet Take 1 tablet by mouth daily 90 tablet 3    ascorbic acid (VITAMIN C) 250 MG tablet Take by mouth daily      Cholecalciferol 50 MCG (2000 UT) TABS Take by mouth      colchicine (COLCRYS) 0.6 MG tablet Two p.o. at onset of gout attack then one p.o. every hour to a maximum of five or N/V or diarrhea      tamsulosin (FLOMAX) 0.4 MG capsule TAKE 1 CAPSULE DAILY       No current facility-administered medications for this visit. KAMI Xavier NP    On this date 11/2/2022 I have spent 60 minutes reviewing previous notes, test results and face to face with the patient discussing the diagnosis and importance of compliance with the treatment plan as well as documenting on the day of the visit. Portions of this note were generated with the assistance of voice recognition software. As such, some errors in transcription may be present.

## 2022-11-03 LAB
C PEPTIDE SERPL-MCNC: 4.5 NG/ML (ref 1.1–4.4)
GAD65 AB SER-ACNC: <5 U/ML (ref 0–5)

## 2022-11-10 LAB — ISLET CELL512 AB SER-ACNC: <7.5 U/ML

## 2022-11-13 LAB — ZNT8 AB: <15 U/ML

## 2022-11-15 ENCOUNTER — TELEPHONE (OUTPATIENT)
Dept: DIABETES SERVICES | Age: 67
End: 2022-11-15

## 2022-11-15 NOTE — TELEPHONE ENCOUNTER
First call and left a message about DME for Rajan natarajan. Asked to call when receive product to schedule education for Rajan.
19-May-2020 12:49

## 2022-12-06 ENCOUNTER — OFFICE VISIT (OUTPATIENT)
Dept: ENDOCRINOLOGY | Age: 67
End: 2022-12-06
Payer: COMMERCIAL

## 2022-12-06 ENCOUNTER — TELEPHONE (OUTPATIENT)
Dept: DIABETES SERVICES | Age: 67
End: 2022-12-06

## 2022-12-06 VITALS
HEART RATE: 95 BPM | DIASTOLIC BLOOD PRESSURE: 80 MMHG | SYSTOLIC BLOOD PRESSURE: 134 MMHG | OXYGEN SATURATION: 90 % | BODY MASS INDEX: 41.05 KG/M2 | WEIGHT: 278 LBS

## 2022-12-06 DIAGNOSIS — E89.0 POSTSURGICAL HYPOTHYROIDISM: ICD-10-CM

## 2022-12-06 DIAGNOSIS — E11.65 TYPE 2 DIABETES MELLITUS WITH HYPERGLYCEMIA, WITH LONG-TERM CURRENT USE OF INSULIN (HCC): Primary | ICD-10-CM

## 2022-12-06 DIAGNOSIS — Z79.4 TYPE 2 DIABETES MELLITUS WITH HYPERGLYCEMIA, WITH LONG-TERM CURRENT USE OF INSULIN (HCC): Primary | ICD-10-CM

## 2022-12-06 PROCEDURE — G8417 CALC BMI ABV UP PARAM F/U: HCPCS | Performed by: DIETITIAN, REGISTERED

## 2022-12-06 PROCEDURE — 3017F COLORECTAL CA SCREEN DOC REV: CPT | Performed by: DIETITIAN, REGISTERED

## 2022-12-06 PROCEDURE — 2022F DILAT RTA XM EVC RTNOPTHY: CPT | Performed by: DIETITIAN, REGISTERED

## 2022-12-06 PROCEDURE — 3078F DIAST BP <80 MM HG: CPT | Performed by: DIETITIAN, REGISTERED

## 2022-12-06 PROCEDURE — 1123F ACP DISCUSS/DSCN MKR DOCD: CPT | Performed by: DIETITIAN, REGISTERED

## 2022-12-06 PROCEDURE — 3046F HEMOGLOBIN A1C LEVEL >9.0%: CPT | Performed by: DIETITIAN, REGISTERED

## 2022-12-06 PROCEDURE — G8484 FLU IMMUNIZE NO ADMIN: HCPCS | Performed by: DIETITIAN, REGISTERED

## 2022-12-06 PROCEDURE — 3074F SYST BP LT 130 MM HG: CPT | Performed by: DIETITIAN, REGISTERED

## 2022-12-06 PROCEDURE — 99215 OFFICE O/P EST HI 40 MIN: CPT | Performed by: DIETITIAN, REGISTERED

## 2022-12-06 PROCEDURE — G8427 DOCREV CUR MEDS BY ELIG CLIN: HCPCS | Performed by: DIETITIAN, REGISTERED

## 2022-12-06 PROCEDURE — 1036F TOBACCO NON-USER: CPT | Performed by: DIETITIAN, REGISTERED

## 2022-12-06 RX ORDER — TIRZEPATIDE 5 MG/.5ML
5 INJECTION, SOLUTION SUBCUTANEOUS WEEKLY
Qty: 2 ML | Refills: 11 | Status: SHIPPED | OUTPATIENT
Start: 2022-12-06

## 2022-12-06 RX ORDER — INSULIN LISPRO 100 [IU]/ML
10 INJECTION, SOLUTION INTRAVENOUS; SUBCUTANEOUS
Qty: 90 ML | Refills: 11 | Status: SHIPPED | OUTPATIENT
Start: 2022-12-06 | End: 2022-12-06 | Stop reason: SDUPTHER

## 2022-12-06 RX ORDER — BLOOD-GLUCOSE SENSOR
1 EACH MISCELLANEOUS
Qty: 2 EACH | Refills: 3
Start: 2022-12-06

## 2022-12-06 RX ORDER — INSULIN LISPRO 100 [IU]/ML
10 INJECTION, SOLUTION INTRAVENOUS; SUBCUTANEOUS
Qty: 90 ML | Refills: 11 | Status: SHIPPED | OUTPATIENT
Start: 2022-12-06

## 2022-12-06 RX ORDER — INSULIN GLARGINE 100 [IU]/ML
60 INJECTION, SOLUTION SUBCUTANEOUS 2 TIMES DAILY
Qty: 90 ML | Refills: 11 | Status: SHIPPED | OUTPATIENT
Start: 2022-12-06

## 2022-12-06 ASSESSMENT — ENCOUNTER SYMPTOMS
BACK PAIN: 0
WHEEZING: 0
DIARRHEA: 0
VOICE CHANGE: 0
CONSTIPATION: 1
ABDOMINAL PAIN: 0
TROUBLE SWALLOWING: 0
SHORTNESS OF BREATH: 0
NAUSEA: 0
COUGH: 0
VOMITING: 0

## 2022-12-06 NOTE — PROGRESS NOTES
[facility] ENDOCRINOLOGY   AND   THYROID NODULE CLINIC    ARUNA Orta  [address], [address] [facility]  Rob Pisano, [address] [place]  Phone [phone]  Facsimile [phone]      Reason for visit: Wendy AWAN (1955) is here for follow up of Type 2 Diabetes Mellitus. ASSESSMENT AND PLAN:    1. Type 2 diabetes mellitus with hyperglycemia, with long-term current use of insulin ([facility])  A1C is 11.1%. Glycemic control is suboptimal.  Patient checks blood sugars 2 times or less daily. Tolerating Coleman 2.5 mg weekly with no reports of side effects. Patient has not been taking the second dose of Lantus because he said he sometimes forgets. Patient says he sometimes takes Humalog with meals as correction only 3 for 50 above 150.    --- Increase Mounjaro to 5 mg weekly. Instructed regarding the common GLP-1 RA/Mounjaro side effects of nausea, vomiting and diarrhea and the less common associations of pancreatitis, and thyroid c-cell tumors. --- Increase Lantus to 60 units every morning with instructions to titrate as listed below. --- Decrease Humalog to 10 units AC 3 times daily with correction of 3 for 50 above 150.  --- Declines diabetes education. --- F/u in 1 month.    - MOUNJARO 5 MG/0.5ML SOPN SC injection; Inject 0.5 mLs into the skin once a week  Dispense: 2 mL; Refill: 11  - Continuous Blood Gluc Sensor (FREESTYLE JULIO 3 SENSOR) MISC; 1 each by Does not apply route 4 times daily (before meals and nightly)  Dispense: 2 each; Refill: 3  - LANTUS SOLOSTAR 100 UNIT/ML injection pen; Inject 60 Units into the skin 2 times daily Titrate by 2 units every 4th day to fasting BG goal of  mg/dL. Max Daily Dose: 150 units  Dispense: 90 mL; Refill: 11  - Insulin Pen Needle 32G X 6 MM MISC; For use with insulin 5 times daily. Dispense: 200 each;  Refill: 11  - HUMALOG KWIKPEN 100 UNIT/ML SOPN; Inject 10 Units into the skin 3 times daily (before meals) Add correction scale 3/50>150 (up to 100 units daily)  Dispense: 90 mL; Refill: 11    2. Postsurgical hypothyroidism  -- Recheck labs 12/16/2022.  - levothyroxine (SYNTHROID) 100 MCG tablet; Take 1 tablet by mouth daily  Dispense: 90 tablet; Refill: 11  - levothyroxine (SYNTHROID) 200 MCG tablet; Take 1 tablet by mouth every morning (before breakfast) Total daily dose 275 mcg  Dispense: 90 tablet; Refill: 11  - TSH; Future  - T4, Free; Future    3. Essential hypertension  BP controlled. BP Readings from Last 3 Encounters:   11/02/22 120/82   09/21/22 110/82   06/15/22 (!) 142/86     - losartan (COZAAR) 25 MG tablet; Take 1 tablet by mouth daily TAKE 1 TABLET DAILY  Dispense: 90 tablet; Refill: 3  - hydroCHLOROthiazide (HYDRODIURIL) 25 MG tablet; Take 1 tablet by mouth daily  Dispense: 90 tablet; Refill: 3    4. Mixed hyperlipidemia   above goal of less than 70. Tolerating rosuvastatin 20 mg daily. - rosuvastatin (CRESTOR) 20 MG tablet; Take 1 tablet by mouth daily  Dispense: 90 tablet; Refill: 3    5. Stage 3a chronic kidney disease (CKD) ([facility] [address]  Patient will benefit from SGLT-2 Inhibitor once A1C is less than 8%. --- We discussed Brett Dennis last visit. Patient never started. 6. Albuminuria    7. Diabetic polyneuropathy associated with type 2 diabetes mellitus ([facility] [address]  --- Will benefit from neuropathic agent. 8. Thyroid cancer [facility])  He reports a history of thyroid cancer status post total thyroidectomy in 2008 in [place] by Dr. Jayce Kay, status post CHILDERS-131 (dose unknown). He then had a recurrence in right lateral cervical lymph nodes status post right lateral neck dissection in 2010. Neck ultrasound negative 2/2021. Thyroglobulin level negative 11/2021. At this point he appears to be disease-free. I will monitor his thyroglobulin level once a year.         HISTORY OF PRESENT ILLNESS:    DIABETES MELLITUS     Current Medications: Lantus 50 units once a day (forgets to take 2nd dose of Lantus daily), Humalog usually 30 units, takes a correction of every point above 150 takes 3 units, Mounjaro 2.5 mg weekly complete. Diagnosis: Type 2 diabetes mellitus diagnosed 15-20 years ago. Current Medications: Metformin  mg with breakfast, Humalog taking once a day - 30-35 units daily, Lantus 50 in AM and 50 at night. Diet: He occasionally eats sweets. He snacks before bed occasionally (crackers, nuts, occasionally ice cream and chocolate). 1st meal at 11 AM-     Exercise: No regular exercise. Monitorin times a day. Blood Glucose: By recall: Fasting High 200s to 300s, after supper 300s to 400s. checks BG between coffee and 11 AM.  2nd BG check before dinner    Hypoglycemia: No episodes. Hemoglobin A1c:  10/29/2020: 8.3.  2021: 8.5.  2021: 9.3.  2021: 9.1.  2022: 9.2.  3/2/2022: 10.5.  6/15/2022: 9.1.  2022: 11.1%     Microalbumin/Nephropathy:  10/8/2020: Creatinine 1.48 (GFR 49). 10/29/2020: Creatinine 1.71 (GFR 41), urine microalbumin-.0.  2021: Creatinine 1.31 (GFR 56). 2021: Creatinine 1.40 (GFR 52). 6/15/2021: Creatinine 3.32 (GFR 20).  2021: Creatinine 3.36 (GFR 20). 2021: Creatinine 1.95 (GFR 37). 2021: Creatinine 1.81 (GFR 38), urine microalbumin/creatinine 91.  3/2/2022: Creatinine 1.83 (GFR 40), urine microalbumin-.0.  8/10/2022: Creatinine 1.8 (GFR 41). 2022  Cr 2.00, GFR 36, microalbumin/Cr ratio 264    Neuropathy: He has a history of diabetic peripheral neuropathy and he occasionally has burning, numbness, tingling of feet. Retinopathy: Eye exam overdue - no retinopathy. Lipids:  Declines statin due to concern that it interfers with glucosamine chondritin sulfate  10/29/2020: Total cholesterol 157, triglycerides 142, HDL 46, LDL 86, VLDL 25.  2021: Total cholesterol 149, triglycerides 212, HDL 43, LDL 71, VLDL 35.  2021: Total cholesterol 180, triglycerides 156, HDL 39, , VLDL 28.  3/2/2022:  Total cholesterol 232, triglycerides 433, HDL 42, , VLDL 75. Prior Treatment: He has a pancreatic cyst but no history of pancreatitis. He did not think Trulicity was effective so he stopped it on his own. THYROID CANCER     Presentation: Bilateral thyroid cancer status post total thyroidectomy 2008, status post CHILDERS-131 2008 in [place]. He had a recurrence in right cervical lymph nodes in 2010 status post right lateral neck dissection. Surgical complications: None. Current symptoms: Denies neck lumps, lymphadenopathy, dysphagia, hoarseness. Imaging:   Neck ultrasound 2/11/2021: No pathologic adenopathy. Labs:  10/29/2020: TSH 0.031.  2/2/2021: TSH 0.005, free T4 2.22, Tg <0.1, Tg Ab <1.0.  3/26/2021: TSH <0.005, free T4 2.54.  6/16/2021: TSH <0.005.  11/11/2021: TSH 1.420, Tg <0.1, Tg Ab <1.0.  3/2/2022: TSH 9.550.  6/15/2022: TSH 4.20, free T4 1.3.  8/24/2022: TSH 20.100.  11/02/2022 TSH 37.600, Free T4: 1.0    Vitamin D:  11/02/2022 41.2 ()        THYROID DISEASE     Presentation/Diagnosis: Postsurgical hypothyroidism     Symptoms: See review of systems. Treatment: Takes generic in AM with other medications, including vitamins. /80   Pulse 95   Wt 278 lb (126.1 kg)   SpO2 90%   BMI 41.05 kg/m²     Weight Trends: Wt Readings from Last 3 Encounters:   12/06/22 278 lb (126.1 kg)   11/02/22 282 lb (127.9 kg)   09/21/22 290 lb (131.5 kg)       Allergies and Medications: Reviewed in Chart    Review of Systems   Constitutional:  Positive for fatigue and unexpected weight change (lost 12 lbs since september 2022, 0.3 lbs per week loss). Negative for appetite change and diaphoresis. HENT:  Negative for trouble swallowing and voice change. Eyes:  Positive for visual disturbance (blurry). Respiratory:  Negative for cough, shortness of breath and wheezing. Cardiovascular:  Negative for chest pain, palpitations and leg swelling.    Gastrointestinal:  Positive for constipation. Negative for abdominal pain, diarrhea, nausea and vomiting. Endocrine: Positive for polydipsia. Negative for cold intolerance, heat intolerance, polyphagia and polyuria. Genitourinary:  Negative for difficulty urinating and frequency. Musculoskeletal:  Negative for arthralgias, back pain and myalgias. Skin:  Negative for pallor. Neurological:  Positive for numbness (numbness burning tingling in hands and feet). Negative for dizziness, tremors, weakness and headaches. Hematological:  Negative for adenopathy. Psychiatric/Behavioral:  Negative for dysphoric mood and sleep disturbance. The patient is not nervous/anxious. Physical Exam  Constitutional:       General: He is not in acute distress. Appearance: Normal appearance. He is normal weight. He is not ill-appearing. HENT:      Head: Normocephalic. Cardiovascular:      Rate and Rhythm: Normal rate and regular rhythm. Pulses: Normal pulses. Pulmonary:      Effort: No respiratory distress. Breath sounds: Normal breath sounds. No wheezing or rhonchi. Chest:      Chest wall: No tenderness. Abdominal:      General: There is no distension. Palpations: Abdomen is soft. Tenderness: There is no abdominal tenderness. There is no guarding. Musculoskeletal:         General: No swelling, tenderness or signs of injury. Cervical back: Neck supple. No tenderness. Right lower leg: No edema. Left lower leg: No edema. Feet:      Right foot:      Skin integrity: Skin integrity normal. No ulcer. Left foot:      Skin integrity: Skin integrity normal. No ulcer. Lymphadenopathy:      Cervical: No cervical adenopathy. Skin:     General: Skin is warm and dry. Findings: No erythema or rash. Neurological:      Mental Status: He is alert. Motor: No weakness.    Psychiatric:         Mood and Affect: Mood normal.         Behavior: Behavior normal.       Orders Placed This Encounter Procedures    TSH     Standing Status:   Future     Standing Expiration Date:   12/6/2023    T4, Free     Standing Status:   Future     Standing Expiration Date:   12/6/2023       Current Outpatient Medications   Medication Sig Dispense Refill    MOUNJARO 5 MG/0.5ML SOPN SC injection Inject 0.5 mLs into the skin once a week 2 mL 11    Continuous Blood Gluc Sensor (FREESTYLE JULIO 3 SENSOR) MISC 1 each by Does not apply route 4 times daily (before meals and nightly) 2 each 3    LANTUS SOLOSTAR 100 UNIT/ML injection pen Inject 60 Units into the skin 2 times daily Titrate by 2 units every 4th day to fasting BG goal of  mg/dL. Max Daily Dose: 150 units 90 mL 11    Insulin Pen Needle 32G X 6 MM MISC For use with insulin 5 times daily. 200 each 11    HUMALOG KWIKPEN 100 UNIT/ML SOPN Inject 10 Units into the skin 3 times daily (before meals) Add correction scale 3/50>150 (up to 100 units daily) 90 mL 11    blood glucose test strips (ASCENSIA AUTODISC VI;ONE TOUCH ULTRA TEST VI) strip 1 each by In Vitro route daily Check BG 4 times daily.  Patient needs accucheck 200 each 11    GVOKE HYPOPEN 2-PACK 1 MG/0.2ML SOAJ Inject 1 mg into the skin as needed (as needed for BG less than 50 mg/dL) 0.4 mL 11    levothyroxine (SYNTHROID) 100 MCG tablet Take 1 tablet by mouth daily 90 tablet 11    levothyroxine (SYNTHROID) 200 MCG tablet Take 1 tablet by mouth every morning (before breakfast) Total daily dose 275 mcg 90 tablet 11    Glucosamine-Chondroitin (GLUCOSAMINE CHONDR COMPLEX PO) Take by mouth      losartan (COZAAR) 25 MG tablet Take 1 tablet by mouth daily TAKE 1 TABLET DAILY 90 tablet 3    hydroCHLOROthiazide (HYDRODIURIL) 25 MG tablet Take 1 tablet by mouth daily 90 tablet 3    ascorbic acid (VITAMIN C) 250 MG tablet Take by mouth daily      Cholecalciferol 50 MCG (2000 UT) TABS Take by mouth      colchicine (COLCRYS) 0.6 MG tablet Two p.o. at onset of gout attack then one p.o. every hour to a maximum of five or N/V or diarrhea      tamsulosin (FLOMAX) 0.4 MG capsule TAKE 1 CAPSULE DAILY       No current facility-administered medications for this visit. KAMI Chapman NP    On this date 12/7/2022  I have spent 50 minutes reviewing previous notes, test results and face to face with the patient discussing the diagnosis and importance of compliance with the treatment plan as well as documenting on the day of the visit. Portions of this note were generated with the assistance of voice recognition software. As such, some errors in transcription may be present.

## 2022-12-06 NOTE — TELEPHONE ENCOUNTER
Call to patient about Tolentino start. He says he has labs on the 16th and can come then. Offered to see him first thing in the Am at 6:30 AM and then can go to lab at 7:30 AM downstairs. He does not want to do that. He said he will put it on himself. He wants to know if comes with directions? It does and also can look it up on line, under Rajan. He will call back if he decides he wants education. Will close file. If he calls of course will schedule.

## 2022-12-06 NOTE — Clinical Note
Morales 2 was ordered last visit - patient did not receive. I am reordering Rajan 3 this time.  Please assist with PA

## 2022-12-16 ENCOUNTER — NURSE ONLY (OUTPATIENT)
Dept: FAMILY MEDICINE CLINIC | Facility: CLINIC | Age: 67
End: 2022-12-16

## 2022-12-16 LAB
ALBUMIN SERPL-MCNC: 3.1 G/DL (ref 3.2–4.6)
ALBUMIN/GLOB SERPL: 0.9 {RATIO} (ref 0.4–1.6)
ALP SERPL-CCNC: 78 U/L (ref 50–136)
ALT SERPL-CCNC: 28 U/L (ref 12–65)
ANION GAP SERPL CALC-SCNC: 6 MMOL/L (ref 2–11)
AST SERPL-CCNC: 16 U/L (ref 15–37)
BILIRUB SERPL-MCNC: 0.5 MG/DL (ref 0.2–1.1)
BUN SERPL-MCNC: 22 MG/DL (ref 8–23)
CALCIUM SERPL-MCNC: 8.6 MG/DL (ref 8.3–10.4)
CHLORIDE SERPL-SCNC: 106 MMOL/L (ref 101–110)
CHOLEST SERPL-MCNC: 183 MG/DL
CO2 SERPL-SCNC: 29 MMOL/L (ref 21–32)
CREAT SERPL-MCNC: 1.7 MG/DL (ref 0.8–1.5)
GLOBULIN SER CALC-MCNC: 3.6 G/DL (ref 2.8–4.5)
GLUCOSE SERPL-MCNC: 213 MG/DL (ref 65–100)
HDLC SERPL-MCNC: 31 MG/DL (ref 40–60)
HDLC SERPL: 5.9 {RATIO}
LDLC SERPL CALC-MCNC: 116.4 MG/DL
POTASSIUM SERPL-SCNC: 4.3 MMOL/L (ref 3.5–5.1)
PROT SERPL-MCNC: 6.7 G/DL (ref 6.3–8.2)
SODIUM SERPL-SCNC: 141 MMOL/L (ref 133–143)
TRIGL SERPL-MCNC: 178 MG/DL (ref 35–150)
VLDLC SERPL CALC-MCNC: 35.6 MG/DL (ref 6–23)

## 2022-12-17 LAB
EST. AVERAGE GLUCOSE BLD GHB EST-MCNC: 289 MG/DL
HBA1C MFR BLD: 11.7 % (ref 4.8–5.6)

## 2023-01-10 ENCOUNTER — NURSE ONLY (OUTPATIENT)
Dept: FAMILY MEDICINE CLINIC | Facility: CLINIC | Age: 68
End: 2023-01-10

## 2023-01-10 ENCOUNTER — OFFICE VISIT (OUTPATIENT)
Dept: ENDOCRINOLOGY | Age: 68
End: 2023-01-10
Payer: COMMERCIAL

## 2023-01-10 VITALS
DIASTOLIC BLOOD PRESSURE: 60 MMHG | BODY MASS INDEX: 41.2 KG/M2 | WEIGHT: 279 LBS | OXYGEN SATURATION: 95 % | HEART RATE: 102 BPM | SYSTOLIC BLOOD PRESSURE: 104 MMHG

## 2023-01-10 DIAGNOSIS — E66.01 OBESITY, CLASS III, BMI 40-49.9 (MORBID OBESITY) (HCC): ICD-10-CM

## 2023-01-10 DIAGNOSIS — E78.00 HYPERCHOLESTEROLEMIA: ICD-10-CM

## 2023-01-10 DIAGNOSIS — E11.65 TYPE 2 DIABETES MELLITUS WITH HYPERGLYCEMIA, WITH LONG-TERM CURRENT USE OF INSULIN (HCC): ICD-10-CM

## 2023-01-10 DIAGNOSIS — Z79.4 TYPE 2 DIABETES MELLITUS WITH HYPERGLYCEMIA, WITH LONG-TERM CURRENT USE OF INSULIN (HCC): Primary | ICD-10-CM

## 2023-01-10 DIAGNOSIS — E11.65 TYPE 2 DIABETES MELLITUS WITH HYPERGLYCEMIA, WITH LONG-TERM CURRENT USE OF INSULIN (HCC): Primary | ICD-10-CM

## 2023-01-10 DIAGNOSIS — E89.0 POSTSURGICAL HYPOTHYROIDISM: Primary | ICD-10-CM

## 2023-01-10 DIAGNOSIS — Z79.4 TYPE 2 DIABETES MELLITUS WITH HYPERGLYCEMIA, WITH LONG-TERM CURRENT USE OF INSULIN (HCC): ICD-10-CM

## 2023-01-10 DIAGNOSIS — N18.31 STAGE 3A CHRONIC KIDNEY DISEASE (CKD) (HCC): ICD-10-CM

## 2023-01-10 PROCEDURE — 2022F DILAT RTA XM EVC RTNOPTHY: CPT | Performed by: DIETITIAN, REGISTERED

## 2023-01-10 PROCEDURE — G8484 FLU IMMUNIZE NO ADMIN: HCPCS | Performed by: DIETITIAN, REGISTERED

## 2023-01-10 PROCEDURE — 1123F ACP DISCUSS/DSCN MKR DOCD: CPT | Performed by: DIETITIAN, REGISTERED

## 2023-01-10 PROCEDURE — 3046F HEMOGLOBIN A1C LEVEL >9.0%: CPT | Performed by: DIETITIAN, REGISTERED

## 2023-01-10 PROCEDURE — G8417 CALC BMI ABV UP PARAM F/U: HCPCS | Performed by: DIETITIAN, REGISTERED

## 2023-01-10 PROCEDURE — G8427 DOCREV CUR MEDS BY ELIG CLIN: HCPCS | Performed by: DIETITIAN, REGISTERED

## 2023-01-10 PROCEDURE — 1036F TOBACCO NON-USER: CPT | Performed by: DIETITIAN, REGISTERED

## 2023-01-10 PROCEDURE — 3017F COLORECTAL CA SCREEN DOC REV: CPT | Performed by: DIETITIAN, REGISTERED

## 2023-01-10 PROCEDURE — 3078F DIAST BP <80 MM HG: CPT | Performed by: DIETITIAN, REGISTERED

## 2023-01-10 PROCEDURE — 3074F SYST BP LT 130 MM HG: CPT | Performed by: DIETITIAN, REGISTERED

## 2023-01-10 PROCEDURE — 99215 OFFICE O/P EST HI 40 MIN: CPT | Performed by: DIETITIAN, REGISTERED

## 2023-01-10 RX ORDER — ATORVASTATIN CALCIUM 20 MG/1
20 TABLET, FILM COATED ORAL DAILY
Qty: 90 TABLET | Refills: 11 | Status: SHIPPED | OUTPATIENT
Start: 2023-01-10

## 2023-01-10 RX ORDER — INSULIN GLARGINE 300 U/ML
72 INJECTION, SOLUTION SUBCUTANEOUS EVERY MORNING
Qty: 45 ML | Refills: 11 | Status: SHIPPED | OUTPATIENT
Start: 2023-01-10

## 2023-01-10 RX ORDER — TIRZEPATIDE 5 MG/.5ML
5 INJECTION, SOLUTION SUBCUTANEOUS WEEKLY
Qty: 6 ML | Refills: 11 | Status: SHIPPED | OUTPATIENT
Start: 2023-01-10

## 2023-01-10 RX ORDER — FLASH GLUCOSE SCANNING READER
1 EACH MISCELLANEOUS 4 TIMES DAILY
Qty: 1 EACH | Refills: 11
Start: 2023-01-10

## 2023-01-10 RX ORDER — BLOOD-GLUCOSE SENSOR
1 EACH MISCELLANEOUS
Qty: 2 EACH | Refills: 11 | Status: SHIPPED | OUTPATIENT
Start: 2023-01-10 | End: 2023-01-10 | Stop reason: ALTCHOICE

## 2023-01-10 RX ORDER — INSULIN LISPRO 100 [IU]/ML
15 INJECTION, SOLUTION INTRAVENOUS; SUBCUTANEOUS
Qty: 90 ML | Refills: 11
Start: 2023-01-10

## 2023-01-10 RX ORDER — FLASH GLUCOSE SENSOR
2 KIT MISCELLANEOUS
Qty: 1 EACH | Refills: 11
Start: 2023-01-10

## 2023-01-10 ASSESSMENT — ENCOUNTER SYMPTOMS
CONSTIPATION: 1
WHEEZING: 0
ABDOMINAL PAIN: 0
SHORTNESS OF BREATH: 0
VOICE CHANGE: 1
COUGH: 0
NAUSEA: 0
VOMITING: 0
DIARRHEA: 0
BACK PAIN: 0
TROUBLE SWALLOWING: 0

## 2023-01-10 NOTE — PROGRESS NOTES
[facility]   [facility]    ARUNA Wright  [address], [address] [facility], [address] [place], [place] [address]kiko  Phone [phone]  Facsimile [phone]      Reason for visit: Chris AWAN (1955) is here for follow up of Type 2 Diabetes Mellitus. ASSESSMENT AND PLAN:      1. Type 2 diabetes mellitus with hyperglycemia, with long-term current use of insulin (HCC)  A1C is 11.7%. Glycemic control is suboptimal. Patient checks BG 2 ore less times daily. Patient tolerated Mounjaro 2.5 mg weekly with no SE but has been off medication for 1 month due to did not receive Rx at pharmacy. Instructed patient to contact our office anytime he does not receive his prescription at the pharmacy for his diabetes medication. Patient reports he never received morales. Morales reordered today. Patient is taking Toujeo daily once every morning. --- Patient reported viral symptoms of cold during our visit- instructed patient to f/u with PCP. --- Restart Mounjaro 5 mg once weekly for A1C reduction and weight reduction. Patient has a history of thyroid CA with thyroidectomy. --- Toujeo increase to 72 units every morning with instructions on titration. --- Instructed patient on how to take his Humalog and Toujeo. --- Diabetes education referral placed. --- F/u with endocrinology in 3 months.    - MOUNJARO 5 MG/0.5ML SOPN SC injection; Inject 0.5 mLs into the skin once a week  Dispense: 6 mL; Refill: 11  - TOUJEO MAX SOLOSTAR 300 UNIT/ML SOPN; Inject 72 Units into the skin every morning Titrate by 2 units every 4th day to fasting BG goal of  mg/dL. Max Daily Dose: 120 units. Dispense: 45 mL; Refill: 11  - Continuous Blood Gluc Sensor (FREESTYLE MORALES 2 SENSOR) MISC; 2 each by Does not apply route 4 times daily (before meals and nightly)  Dispense: 1 each;  Refill: 11  - Continuous Blood Gluc  (FREESTYLE MORALES 2 READER) VARUN; 1 each by Does not apply route 4 times daily  Dispense: 1 each; Refill: 11  - [facility]  - TSH; Future  - T4, Free; Future  - HUMALOG KWIKPEN 100 UNIT/ML SOPN; Inject 15 Units into the skin 3 times daily (before meals) Add correction scale 3/50>150 (up to 100 units daily)  Dispense: 90 mL; Refill: 11    2. Postsurgical hypothyroidism  -- Recheck labs. - levothyroxine (SYNTHROID) 100 MCG tablet; Take 1 tablet by mouth daily  Dispense: 90 tablet; Refill: 11  - levothyroxine (SYNTHROID) 200 MCG tablet; Take 1 tablet by mouth every morning (before breakfast) Total daily dose 275 mcg  Dispense: 90 tablet; Refill: 11  - TSH; Future  - T4, Free; Future    3. Essential hypertension  BP controlled. BP Readings from Last 3 Encounters:   11/02/22 120/82   09/21/22 110/82   06/15/22 (!) 142/86     - losartan (COZAAR) 25 MG tablet; Take 1 tablet by mouth daily TAKE 1 TABLET DAILY  Dispense: 90 tablet; Refill: 3  - hydroCHLOROthiazide (HYDRODIURIL) 25 MG tablet; Take 1 tablet by mouth daily  Dispense: 90 tablet; Refill: 3    4. Mixed hyperlipidemia   above goal of less than 70. Stopped statin because he read that statin counteracts what glucosamine does for joints. -- Restart atorvastatin 20 mg daily - f/u with PCP in 3 months for CMP recheck and Lipids check. - atorvastatin (LIPITOR) 20 MG tablet; Take 1 tablet by mouth daily  Dispense: 90 tablet; Refill: 11    5. Stage 3a chronic kidney disease (CKD) ([facility] [address]  Patient will benefit from SGLT-2 Inhibitor once A1C is less than 8%. --- We discussed Maria G Jasper last visit. Patient has not started Maria G Jasper. 6. Albuminuria    7. Diabetic polyneuropathy associated with type 2 diabetes mellitus ([facility] [address]  --- Will benefit from neuropathic agent. 8. Thyroid cancer [facility])  He reports a history of thyroid cancer status post total thyroidectomy in 2008 in [place] by Dr. Saleem Hernandez, status post CHILDERS-131 (dose unknown).   He then had a recurrence in right lateral cervical lymph nodes status post right lateral neck dissection in . Neck ultrasound negative 2021. Thyroglobulin level negative 2021. At this point he appears to be disease-free. I will monitor his thyroglobulin level once a year. 9. Obesity, Class III, BMI 40-49.9 (morbid obesity) (HCC)    HISTORY OF PRESENT ILLNESS:    DIABETES MELLITUS     Current Medications: Lantus 60 units once a day (forgets to take 2nd dose of Lantus daily), Humalog usually 30 units, takes a correction of every point above 150 takes 3 units, Mounjaro 2.5 mg weekly complete. Diagnosis: Type 2 diabetes mellitus diagnosed 15-20 years ago. Current Medications: Metformin  mg with breakfast, Humalog taking once a day - 30-35 units daily, Lantus 50 in AM and 50 at night. Diet: He occasionally eats sweets. He snacks before bed occasionally (crackers, nuts, occasionally ice cream and chocolate). 1st meal at 11 AM-     Exercise: No regular exercise. Monitorin times a day. Blood Glucose: By recall: Fasting High 200s to 300s, after supper 300s to 400s. checks BG between coffee and 11 AM.  2nd BG check before dinner    Hypoglycemia: No episodes. Hemoglobin A1c:  10/29/2020: 8.3.  2021: 8.5.  2021: 9.3.  2021: 9.1.  2022: 9.2.  3/2/2022: 10.5.  6/15/2022: 9.1.  2022: 11.1%  2022 11.7%     Microalbumin/Nephropathy:  10/8/2020: Creatinine 1.48 (GFR 49). 10/29/2020: Creatinine 1.71 (GFR 41), urine microalbumin-.0.  2021: Creatinine 1.31 (GFR 56). 2021: Creatinine 1.40 (GFR 52). 6/15/2021: Creatinine 3.32 (GFR 20).  2021: Creatinine 3.36 (GFR 20). 2021: Creatinine 1.95 (GFR 37). 2021: Creatinine 1.81 (GFR 38), urine microalbumin/creatinine 91.  3/2022: Creatinine 1.83 (GFR 40), urine microalbumin-.0.  8/10/2022: Creatinine 1.8 (GFR 41).   2022  Cr 2.00, GFR 36, microalbumin/Cr ratio 264  2022 Cr 1.70, GFR 43 (increase since stopping metformin)    Neuropathy: He has a history of diabetic peripheral neuropathy and he occasionally has burning, numbness, tingling of feet. Retinopathy: Eye exam overdue - no retinopathy. Lipids:  Declines statin due to concern that it interfers with glucosamine chondritin sulfate  10/29/2020: Total cholesterol 157, triglycerides 142, HDL 46, LDL 86, VLDL 25.  2/5/2021: Total cholesterol 149, triglycerides 212, HDL 43, LDL 71, VLDL 35.  11/11/2021: Total cholesterol 180, triglycerides 156, HDL 39, , VLDL 28.  3/2/2022: Total cholesterol 232, triglycerides 433, HDL 42, , VLDL 75.  12/16/2022  TC- 183, LDL- 116.4, VLDL- 35.6,  HDL- 31, TG- 178    Prior Treatment: He has a pancreatic cyst but no history of pancreatitis. He did not think Trulicity was effective so he stopped it on his own. THYROID CANCER     Presentation: Bilateral thyroid cancer status post total thyroidectomy 2008, status post CHILDERS-131 2008 in [place]. He had a recurrence in right cervical lymph nodes in 2010 status post right lateral neck dissection. Surgical complications: None. Current symptoms: Denies neck lumps, lymphadenopathy, dysphagia, hoarseness. Imaging:   Neck ultrasound 2/11/2021: No pathologic adenopathy. Labs:  10/29/2020: TSH 0.031.  2/2/2021: TSH 0.005, free T4 2.22, Tg <0.1, Tg Ab <1.0.  3/26/2021: TSH <0.005, free T4 2.54.  6/16/2021: TSH <0.005.  11/11/2021: TSH 1.420, Tg <0.1, Tg Ab <1.0.  3/2/2022: TSH 9.550.  6/15/2022: TSH 4.20, free T4 1.3.  8/24/2022: TSH 20.100.  11/02/2022 TSH 37.600, Free T4: 1.0    Vitamin D:  11/02/2022 41.2 ()     THYROID DISEASE     Presentation/Diagnosis: Postsurgical hypothyroidism     Symptoms: See review of systems. Treatment: Takes generic in AM with other medications, including vitamins. There were no vitals taken for this visit. Weight Trends:   Wt Readings from Last 3 Encounters:   12/06/22 278 lb (126.1 kg)   11/02/22 282 lb (127.9 kg)   09/21/22 290 lb (131.5 kg) Allergies and Medications: Reviewed in Chart    Review of Systems   Constitutional:  Positive for fatigue and unexpected weight change (lost 12 lbs since september 2022, 0.3 lbs per week loss). Negative for appetite change and diaphoresis. HENT:  Positive for postnasal drip, sneezing and voice change. Negative for trouble swallowing. Eyes:  Positive for visual disturbance (blurry). Respiratory:  Negative for cough, shortness of breath and wheezing. Cardiovascular:  Negative for chest pain, palpitations and leg swelling. Gastrointestinal:  Positive for constipation. Negative for abdominal pain, diarrhea, nausea and vomiting. Endocrine: Positive for polydipsia. Negative for cold intolerance, heat intolerance, polyphagia and polyuria. Genitourinary:  Negative for difficulty urinating and frequency. Musculoskeletal:  Negative for arthralgias, back pain and myalgias. Skin:  Negative for pallor. Neurological:  Positive for numbness (numbness burning tingling in hands and feet). Negative for dizziness, tremors, weakness and headaches. Hematological:  Negative for adenopathy. Psychiatric/Behavioral:  Negative for dysphoric mood and sleep disturbance. The patient is not nervous/anxious. Physical Exam  Constitutional:       General: He is not in acute distress. Appearance: Normal appearance. He is normal weight. He is not ill-appearing. HENT:      Head: Normocephalic. Cardiovascular:      Rate and Rhythm: Normal rate and regular rhythm. Pulses: Normal pulses. Pulmonary:      Effort: No respiratory distress. Breath sounds: Normal breath sounds. No wheezing or rhonchi. Chest:      Chest wall: No tenderness. Abdominal:      General: There is no distension. Palpations: Abdomen is soft. Tenderness: There is no abdominal tenderness. There is no guarding. Musculoskeletal:         General: No swelling, tenderness or signs of injury.       Cervical back: Neck supple. No tenderness. Right lower leg: No edema. Left lower leg: No edema. Feet:      Right foot:      Skin integrity: Skin integrity normal. No ulcer. Left foot:      Skin integrity: Skin integrity normal. No ulcer. Lymphadenopathy:      Cervical: No cervical adenopathy. Skin:     General: Skin is warm and dry. Findings: No erythema or rash. Neurological:      Mental Status: He is alert. Motor: No weakness. Psychiatric:         Mood and Affect: Mood normal.         Behavior: Behavior normal.       No orders of the defined types were placed in this encounter. Current Outpatient Medications   Medication Sig Dispense Refill    MOUNJARO 5 MG/0.5ML SOPN SC injection Inject 0.5 mLs into the skin once a week 2 mL 11    Continuous Blood Gluc Sensor (FREESTYLE JULIO 3 SENSOR) MISC 1 each by Does not apply route 4 times daily (before meals and nightly) 2 each 3    LANTUS SOLOSTAR 100 UNIT/ML injection pen Inject 60 Units into the skin 2 times daily Titrate by 2 units every 4th day to fasting BG goal of  mg/dL. Max Daily Dose: 150 units 90 mL 11    Insulin Pen Needle 32G X 6 MM MISC For use with insulin 5 times daily. 200 each 11    HUMALOG KWIKPEN 100 UNIT/ML SOPN Inject 10 Units into the skin 3 times daily (before meals) Add correction scale 3/50>150 (up to 100 units daily) 90 mL 11    blood glucose test strips (ASCENSIA AUTODISC VI;ONE TOUCH ULTRA TEST VI) strip 1 each by In Vitro route daily Check BG 4 times daily.  Patient needs accucheck 200 each 11    GVOKE HYPOPEN 2-PACK 1 MG/0.2ML SOAJ Inject 1 mg into the skin as needed (as needed for BG less than 50 mg/dL) 0.4 mL 11    levothyroxine (SYNTHROID) 100 MCG tablet Take 1 tablet by mouth daily 90 tablet 11    levothyroxine (SYNTHROID) 200 MCG tablet Take 1 tablet by mouth every morning (before breakfast) Total daily dose 275 mcg 90 tablet 11    Glucosamine-Chondroitin (GLUCOSAMINE CHONDR COMPLEX PO) Take by mouth losartan (COZAAR) 25 MG tablet Take 1 tablet by mouth daily TAKE 1 TABLET DAILY 90 tablet 3    hydroCHLOROthiazide (HYDRODIURIL) 25 MG tablet Take 1 tablet by mouth daily 90 tablet 3    ascorbic acid (VITAMIN C) 250 MG tablet Take by mouth daily      Cholecalciferol 50 MCG (2000 UT) TABS Take by mouth      colchicine (COLCRYS) 0.6 MG tablet Two p.o. at onset of gout attack then one p.o. every hour to a maximum of five or N/V or diarrhea      tamsulosin (FLOMAX) 0.4 MG capsule TAKE 1 CAPSULE DAILY       No current facility-administered medications for this visit. KAMI Velasquez NP    On this date 01/10/2023  I have spent 50 minutes reviewing previous notes, test results and face to face with the patient discussing the diagnosis and importance of compliance with the treatment plan as well as documenting on the day of the visit. Portions of this note were generated with the assistance of voice recognition software. As such, some errors in transcription may be present.

## 2023-01-10 NOTE — Clinical Note
Please assist patient to get Morales 3- he has medicare and is taking Insulin daily. I ordered this previously and he has not received yet.

## 2023-01-11 ENCOUNTER — TELEPHONE (OUTPATIENT)
Dept: DIABETES SERVICES | Age: 68
End: 2023-01-11

## 2023-01-11 LAB
T4 FREE SERPL-MCNC: 1.7 NG/DL (ref 0.78–1.46)
TSH, 3RD GENERATION: 0.06 UIU/ML (ref 0.36–3.74)

## 2023-01-11 NOTE — TELEPHONE ENCOUNTER
Call to patient about referral for DME received today. Called and left message with call back number and explained coverage if not had on Medicare prior.

## 2023-01-12 RX ORDER — LEVOTHYROXINE SODIUM 0.07 MG/1
75 TABLET ORAL
Qty: 30 TABLET | Refills: 11 | Status: SHIPPED | OUTPATIENT
Start: 2023-01-12

## 2023-01-12 RX ORDER — LEVOTHYROXINE SODIUM 0.2 MG/1
200 TABLET ORAL
Qty: 90 TABLET | Refills: 11
Start: 2023-01-12

## 2023-01-12 NOTE — RESULT ENCOUNTER NOTE
Please contact patient and instruct him to take Synthroid (levothyroxine) 275 mcg daily every morning. There is a need to lower his dose to improve his thyroid levels. A new Rx for 75 mcg is being sent to the pharmacy. HE will take a 200 mcg tablet and a 75 mcg tablet together, every morning, 30 minutes before eating breakfast on an empty stomach with water. Do not take other medications and supplements at the same time when you take your synthroid dose. Recheck labs in 6 weeks on 2/23/2023. Labs are ordered- follow up with primary care - Dr. Lorri Goodman until new endo provider in place March or April.

## 2023-01-12 NOTE — PROGRESS NOTES
1. Type 2 diabetes mellitus with hyperglycemia, with long-term current use of insulin (HCC)    --- New dose decrease: 275 mcg every morning - empty stomach 30 minutes before breakfast.  ---Recheck thyroid function on 2/23/2023.   ---Patient is to f/u with PCP until endo in place.    - T4, Free  - TSH    2. Postsurgical hypothyroidism    - levothyroxine (SYNTHROID) 75 MCG tablet; Take 1 tablet by mouth every morning (before breakfast) Take this with a 200 mcg pill daily for a total dose of 275 mcg every morning. Dispense: 30 tablet; Refill: 11  - TSH; Future  - T4, Free; Future  - levothyroxine (SYNTHROID) 200 MCG tablet; Take 1 tablet by mouth every morning (before breakfast) Total daily dose 275 mcg  Dispense: 90 tablet;  Refill: 11

## 2023-01-24 ENCOUNTER — TELEPHONE (OUTPATIENT)
Dept: DIABETES SERVICES | Age: 68
End: 2023-01-24

## 2023-01-24 NOTE — TELEPHONE ENCOUNTER
Third call about DME. LM. If do not hear back by 2-7-23 will mail letter. If do not hear back from letter by 3-1-23, close file. Will notify referring of plan.

## 2023-03-10 DIAGNOSIS — I10 ESSENTIAL HYPERTENSION: ICD-10-CM

## 2023-03-10 DIAGNOSIS — E89.0 POSTSURGICAL HYPOTHYROIDISM: ICD-10-CM

## 2023-03-10 NOTE — TELEPHONE ENCOUNTER
Presley Rey and Dr. Lester Mcelroy patient. Pt called to get his labs results   Patient given lab results below. \"Please contact patient and instruct him to take Synthroid (levothyroxine) 275 mcg daily every morning. There is a need to lower his dose to improve his thyroid levels. A new Rx for 75 mcg is being sent to the pharmacy. HE will take a 200 mcg tablet and a 75 mcg tablet together, every morning, 30 minutes before eating breakfast on an empty stomach with water. Do not take other medications and supplements at the same time when you take your synthroid dose. Recheck labs in 6 weeks on 2/23/2023. Labs are ordered- follow up with primary care - Dr. Anthony Hidalgo until new endo provider in place March or April. \"    Patient expressed understanding. He needs a refill on the pended medications. I had him schedule a f/u with Driss Ty for his diabetes.

## 2023-03-13 RX ORDER — HYDROCHLOROTHIAZIDE 25 MG/1
25 TABLET ORAL DAILY
Qty: 90 TABLET | Refills: 3 | Status: SHIPPED | OUTPATIENT
Start: 2023-03-13

## 2023-03-13 RX ORDER — LEVOTHYROXINE SODIUM 0.2 MG/1
200 TABLET ORAL
Qty: 90 TABLET | Refills: 3 | Status: SHIPPED | OUTPATIENT
Start: 2023-03-13

## 2023-03-13 RX ORDER — LEVOTHYROXINE SODIUM 0.07 MG/1
75 TABLET ORAL
Qty: 90 TABLET | Refills: 3 | Status: SHIPPED | OUTPATIENT
Start: 2023-03-13

## 2023-03-20 ENCOUNTER — TELEPHONE (OUTPATIENT)
Dept: FAMILY MEDICINE CLINIC | Facility: CLINIC | Age: 68
End: 2023-03-20

## 2023-04-24 ENCOUNTER — OFFICE VISIT (OUTPATIENT)
Dept: FAMILY MEDICINE CLINIC | Facility: CLINIC | Age: 68
End: 2023-04-24
Payer: COMMERCIAL

## 2023-04-24 VITALS
SYSTOLIC BLOOD PRESSURE: 110 MMHG | HEIGHT: 69 IN | WEIGHT: 268 LBS | BODY MASS INDEX: 39.69 KG/M2 | DIASTOLIC BLOOD PRESSURE: 76 MMHG

## 2023-04-24 DIAGNOSIS — N52.9 ERECTILE DYSFUNCTION, UNSPECIFIED ERECTILE DYSFUNCTION TYPE: ICD-10-CM

## 2023-04-24 DIAGNOSIS — Z79.4 TYPE 2 DIABETES MELLITUS WITH HYPERGLYCEMIA, WITH LONG-TERM CURRENT USE OF INSULIN (HCC): ICD-10-CM

## 2023-04-24 DIAGNOSIS — E11.65 TYPE 2 DIABETES MELLITUS WITH HYPERGLYCEMIA, WITH LONG-TERM CURRENT USE OF INSULIN (HCC): ICD-10-CM

## 2023-04-24 DIAGNOSIS — R53.82 CHRONIC FATIGUE: Primary | ICD-10-CM

## 2023-04-24 PROCEDURE — 1036F TOBACCO NON-USER: CPT | Performed by: FAMILY MEDICINE

## 2023-04-24 PROCEDURE — 3074F SYST BP LT 130 MM HG: CPT | Performed by: FAMILY MEDICINE

## 2023-04-24 PROCEDURE — 1123F ACP DISCUSS/DSCN MKR DOCD: CPT | Performed by: FAMILY MEDICINE

## 2023-04-24 PROCEDURE — 99213 OFFICE O/P EST LOW 20 MIN: CPT | Performed by: FAMILY MEDICINE

## 2023-04-24 PROCEDURE — 3017F COLORECTAL CA SCREEN DOC REV: CPT | Performed by: FAMILY MEDICINE

## 2023-04-24 PROCEDURE — G8417 CALC BMI ABV UP PARAM F/U: HCPCS | Performed by: FAMILY MEDICINE

## 2023-04-24 PROCEDURE — G8427 DOCREV CUR MEDS BY ELIG CLIN: HCPCS | Performed by: FAMILY MEDICINE

## 2023-04-24 PROCEDURE — 3078F DIAST BP <80 MM HG: CPT | Performed by: FAMILY MEDICINE

## 2023-04-24 RX ORDER — SILDENAFIL 100 MG/1
100 TABLET, FILM COATED ORAL PRN
Qty: 30 TABLET | Refills: 11
Start: 2023-04-24

## 2023-04-24 RX ORDER — INSULIN LISPRO 100 [IU]/ML
15 INJECTION, SOLUTION INTRAVENOUS; SUBCUTANEOUS
Qty: 30 ML | Refills: 3 | Status: SHIPPED | OUTPATIENT
Start: 2023-04-24

## 2023-04-24 SDOH — ECONOMIC STABILITY: FOOD INSECURITY: WITHIN THE PAST 12 MONTHS, YOU WORRIED THAT YOUR FOOD WOULD RUN OUT BEFORE YOU GOT MONEY TO BUY MORE.: NEVER TRUE

## 2023-04-24 SDOH — ECONOMIC STABILITY: HOUSING INSECURITY
IN THE LAST 12 MONTHS, WAS THERE A TIME WHEN YOU DID NOT HAVE A STEADY PLACE TO SLEEP OR SLEPT IN A SHELTER (INCLUDING NOW)?: NO

## 2023-04-24 SDOH — ECONOMIC STABILITY: INCOME INSECURITY: HOW HARD IS IT FOR YOU TO PAY FOR THE VERY BASICS LIKE FOOD, HOUSING, MEDICAL CARE, AND HEATING?: NOT HARD AT ALL

## 2023-04-24 SDOH — ECONOMIC STABILITY: FOOD INSECURITY: WITHIN THE PAST 12 MONTHS, THE FOOD YOU BOUGHT JUST DIDN'T LAST AND YOU DIDN'T HAVE MONEY TO GET MORE.: NEVER TRUE

## 2023-04-24 ASSESSMENT — ENCOUNTER SYMPTOMS
ABDOMINAL PAIN: 0
RHINORRHEA: 0
SINUS PAIN: 0
SHORTNESS OF BREATH: 0
COUGH: 0
CHEST TIGHTNESS: 0
DIARRHEA: 0

## 2023-04-24 ASSESSMENT — PATIENT HEALTH QUESTIONNAIRE - PHQ9
SUM OF ALL RESPONSES TO PHQ QUESTIONS 1-9: 0
SUM OF ALL RESPONSES TO PHQ9 QUESTIONS 1 & 2: 0
SUM OF ALL RESPONSES TO PHQ QUESTIONS 1-9: 0
2. FEELING DOWN, DEPRESSED OR HOPELESS: 0
SUM OF ALL RESPONSES TO PHQ QUESTIONS 1-9: 0
SUM OF ALL RESPONSES TO PHQ QUESTIONS 1-9: 0
1. LITTLE INTEREST OR PLEASURE IN DOING THINGS: 0

## 2023-04-24 NOTE — PROGRESS NOTES
PROGRESS NOTE    SUBJECTIVE:   Wendy Alicea is a 75 y.o. male seen for a follow up visit regarding the following chief complaint:       HPI patient presents the office today complaining of ED issues and low sex drive      Past Medical History, Past Surgical History, Family history, Social History, and Medications were all reviewed with the patient today and updated as necessary. Current Outpatient Medications   Medication Sig Dispense Refill    sildenafil (VIAGRA) 100 MG tablet Take 1 tablet by mouth as needed for Erectile Dysfunction (1/2 to 1 tablet as needed. Max 100mg/ daily) 30 tablet 11    hydroCHLOROthiazide (HYDRODIURIL) 25 MG tablet Take 1 tablet by mouth daily 90 tablet 3    levothyroxine (SYNTHROID) 200 MCG tablet Take 1 tablet by mouth every morning (before breakfast) Total daily dose 275 mcg 90 tablet 3    levothyroxine (SYNTHROID) 75 MCG tablet Take 1 tablet by mouth every morning (before breakfast) Take this with a 200 mcg pill daily for a total dose of 275 mcg every morning. 90 tablet 3    TOUJEO MAX SOLOSTAR 300 UNIT/ML SOPN Inject 72 Units into the skin every morning Titrate by 2 units every 4th day to fasting BG goal of  mg/dL. Max Daily Dose: 120 units. 45 mL 11    Continuous Blood Gluc Sensor (FREESTYLE JULIO 2 SENSOR) MISC 2 each by Does not apply route 4 times daily (before meals and nightly) 1 each 11    Continuous Blood Gluc  (FREESTYLE JULIO 2 READER) VARUN 1 each by Does not apply route 4 times daily 1 each 11    atorvastatin (LIPITOR) 20 MG tablet Take 1 tablet by mouth daily 90 tablet 11    HUMALOG KWIKPEN 100 UNIT/ML SOPN Inject 15 Units into the skin 3 times daily (before meals) Add correction scale 3/50>150 (up to 100 units daily) 90 mL 11    Insulin Pen Needle 32G X 6 MM MISC For use with insulin 5 times daily. 200 each 11    blood glucose test strips (ASCENSIA AUTODISC VI;ONE TOUCH ULTRA TEST VI) strip 1 each by In Vitro route daily Check BG 4 times daily.

## 2023-05-25 RX ORDER — PRAVASTATIN SODIUM 40 MG
40 TABLET ORAL DAILY
Qty: 90 TABLET | Refills: 1 | Status: SHIPPED | OUTPATIENT
Start: 2023-05-25

## 2023-06-01 RX ORDER — TAMSULOSIN HYDROCHLORIDE 0.4 MG/1
CAPSULE ORAL
Qty: 90 CAPSULE | Refills: 3 | OUTPATIENT
Start: 2023-06-01

## 2023-08-16 ENCOUNTER — OFFICE VISIT (OUTPATIENT)
Dept: FAMILY MEDICINE CLINIC | Facility: CLINIC | Age: 68
End: 2023-08-16
Payer: COMMERCIAL

## 2023-08-16 VITALS
BODY MASS INDEX: 39.99 KG/M2 | SYSTOLIC BLOOD PRESSURE: 130 MMHG | DIASTOLIC BLOOD PRESSURE: 80 MMHG | WEIGHT: 270 LBS | HEIGHT: 69 IN

## 2023-08-16 DIAGNOSIS — E66.01 MORBID (SEVERE) OBESITY DUE TO EXCESS CALORIES (HCC): ICD-10-CM

## 2023-08-16 DIAGNOSIS — Z12.11 SPECIAL SCREENING FOR MALIGNANT NEOPLASMS, COLON: ICD-10-CM

## 2023-08-16 DIAGNOSIS — E11.65 TYPE 2 DIABETES MELLITUS WITH HYPERGLYCEMIA, WITH LONG-TERM CURRENT USE OF INSULIN (HCC): ICD-10-CM

## 2023-08-16 DIAGNOSIS — E89.0 POSTSURGICAL HYPOTHYROIDISM: ICD-10-CM

## 2023-08-16 DIAGNOSIS — Z00.00 MEDICARE ANNUAL WELLNESS VISIT, SUBSEQUENT: ICD-10-CM

## 2023-08-16 DIAGNOSIS — Z12.5 SPECIAL SCREENING FOR MALIGNANT NEOPLASM OF PROSTATE: ICD-10-CM

## 2023-08-16 DIAGNOSIS — I10 ESSENTIAL HYPERTENSION: ICD-10-CM

## 2023-08-16 DIAGNOSIS — N40.0 BENIGN PROSTATIC HYPERPLASIA WITHOUT LOWER URINARY TRACT SYMPTOMS: ICD-10-CM

## 2023-08-16 DIAGNOSIS — E55.9 VITAMIN D DEFICIENCY, UNSPECIFIED: ICD-10-CM

## 2023-08-16 DIAGNOSIS — C73 THYROID CANCER (HCC): ICD-10-CM

## 2023-08-16 DIAGNOSIS — E78.2 MIXED HYPERLIPIDEMIA: ICD-10-CM

## 2023-08-16 DIAGNOSIS — C64.2 MALIGNANT NEOPLASM OF LEFT KIDNEY, EXCEPT RENAL PELVIS (HCC): ICD-10-CM

## 2023-08-16 DIAGNOSIS — N52.9 ERECTILE DYSFUNCTION, UNSPECIFIED ERECTILE DYSFUNCTION TYPE: Primary | ICD-10-CM

## 2023-08-16 DIAGNOSIS — E78.00 HYPERCHOLESTEROLEMIA: ICD-10-CM

## 2023-08-16 DIAGNOSIS — Z79.4 TYPE 2 DIABETES MELLITUS WITH HYPERGLYCEMIA, WITH LONG-TERM CURRENT USE OF INSULIN (HCC): ICD-10-CM

## 2023-08-16 PROCEDURE — G0439 PPPS, SUBSEQ VISIT: HCPCS | Performed by: FAMILY MEDICINE

## 2023-08-16 PROCEDURE — 3075F SYST BP GE 130 - 139MM HG: CPT | Performed by: FAMILY MEDICINE

## 2023-08-16 PROCEDURE — 1123F ACP DISCUSS/DSCN MKR DOCD: CPT | Performed by: FAMILY MEDICINE

## 2023-08-16 PROCEDURE — 3079F DIAST BP 80-89 MM HG: CPT | Performed by: FAMILY MEDICINE

## 2023-08-16 PROCEDURE — 3017F COLORECTAL CA SCREEN DOC REV: CPT | Performed by: FAMILY MEDICINE

## 2023-08-16 PROCEDURE — 3046F HEMOGLOBIN A1C LEVEL >9.0%: CPT | Performed by: FAMILY MEDICINE

## 2023-08-16 RX ORDER — LEVOTHYROXINE SODIUM 0.07 MG/1
75 TABLET ORAL
Qty: 90 TABLET | Refills: 3 | Status: CANCELLED | OUTPATIENT
Start: 2023-08-16

## 2023-08-16 RX ORDER — LEVOTHYROXINE SODIUM 0.2 MG/1
200 TABLET ORAL
Qty: 90 TABLET | Refills: 3 | Status: CANCELLED | OUTPATIENT
Start: 2023-08-16

## 2023-08-16 RX ORDER — ATORVASTATIN CALCIUM 20 MG/1
20 TABLET, FILM COATED ORAL DAILY
Qty: 90 TABLET | Refills: 3 | Status: SHIPPED | OUTPATIENT
Start: 2023-08-16

## 2023-08-16 RX ORDER — TAMSULOSIN HYDROCHLORIDE 0.4 MG/1
0.4 CAPSULE ORAL DAILY
Qty: 90 CAPSULE | Refills: 3 | Status: SHIPPED | OUTPATIENT
Start: 2023-08-16

## 2023-08-16 RX ORDER — INSULIN GLARGINE 100 [IU]/ML
INJECTION, SOLUTION SUBCUTANEOUS
COMMUNITY
Start: 2023-08-10

## 2023-08-16 RX ORDER — SILDENAFIL 100 MG/1
100 TABLET, FILM COATED ORAL PRN
Qty: 30 TABLET | Refills: 11 | Status: SHIPPED | OUTPATIENT
Start: 2023-08-16

## 2023-08-16 RX ORDER — INSULIN GLARGINE 100 [IU]/ML
70 INJECTION, SOLUTION SUBCUTANEOUS DAILY
Qty: 15 ADJUSTABLE DOSE PRE-FILLED PEN SYRINGE | Refills: 3 | Status: CANCELLED | OUTPATIENT
Start: 2023-08-16 | End: 2023-09-15

## 2023-08-16 RX ORDER — INSULIN LISPRO 100 [IU]/ML
15 INJECTION, SOLUTION INTRAVENOUS; SUBCUTANEOUS
Qty: 30 ML | Refills: 3 | Status: CANCELLED | OUTPATIENT
Start: 2023-08-16

## 2023-08-16 RX ORDER — PRAVASTATIN SODIUM 40 MG
40 TABLET ORAL DAILY
Qty: 90 TABLET | Refills: 3 | Status: SHIPPED | OUTPATIENT
Start: 2023-08-16

## 2023-08-16 RX ORDER — HYDROCHLOROTHIAZIDE 25 MG/1
25 TABLET ORAL DAILY
Qty: 90 TABLET | Refills: 3 | Status: SHIPPED | OUTPATIENT
Start: 2023-08-16

## 2023-08-16 ASSESSMENT — MINI MENTAL STATE EXAM
WHAT CITY/TOWN ARE WE IN?: 0
WHAT IS TODAY'S DATE?: 0
SAY: I WOULD LIKE YOU TO COUNT BACKWARD FROM 100 BY SEVENS: 0
WHAT FLOOR ARE WE ON [IN FACILITY]?/ WHAT ROOM ARE WE IN [IN HOME]?: 0
PLACE DESIGN, ERASER AND PENCIL IN FRONT OF THE PERSON.  SAY:  COPY THIS DESIGN PLEASE.  SHOW: DESIGN. ALLOW: MULTIPLE TRIES. WAIT UNTIL PERSON IS FINISHED AND HANDS IT BACK. SCORE: ONLY FOR DIAGRAM WITH 4-SIDED FIGURE BETWEEN TWO 5-SIDED FIGURES: 0
SHOW: WRISTWATCH [OBJECT] ASK: WHAT IS THIS CALLED?: 0
WHAT IS THE NAME OF THIS BUILDING [IN FACILITY]?/WHAT IS THE STREET ADDRESS OF THIS HOUSE [IN HOME]?: 0
WHAT DAY OF THE WEEK IS THIS?: 0
SAY: READ THE WORDS ON THE PAGE AND THEN DO WHAT IT SAYS. THEN HAND THE PERSON
THE SHEET WITH CLOSE YOUR EYES ON IT. IF THE SUBJECT READS AND DOES NOT CLOSE THEIR EYES, REPEAT UP TO THREE TIMES. SCORE ONLY IF SUBJECT CLOSES EYES.: 0
NOW WHAT WERE THE THREE OBJECTS I ASKED YOU TO REMEMBER?: 0
SHOW: PENCIL [OBJECT] ASK: WHAT IS THIS CALLED?: 0
SAY: I WOULD LIKE YOU TO REPEAT THIS PHRASE AFTER ME: NO IFS, ANDS, OR BUTS.: 0
WHICH SEASON IS THIS?: 0
HAND THE PERSON A PENCIL AND PAPER. SAY: WRITE ANY COMPLETE SENTENCE ON THAT
PIECE OF PAPER. (NOTE: THE SENTENCE MUST MAKE SENSE. IGNORE SPELLING ERRORS): 0
ASK THE PERSON IF HE IS RIGHT OR LEFT-HANDED. TAKE A PIECE OF PAPER AND HOLD IT UP IN
FRONT OF THE PERSON. SAY: TAKE THIS PAPER IN YOUR RIGHT/LEFT HAND (WHICHEVER IS NON-
DOMINANT), SCORE IF PAPER IS PICKED UP IN CORRECT HAND.: 0
SAY: I AM GOING TO NAME THREE OBJECTS. WHEN I AM FINISHED, I WANT YOU TO REPEAT
THEM. REMEMBER WHAT THEY ARE BECAUSE I AM GOING TO ASK YOU TO NAME THEM AGAIN IN
A FEW MINUTES.  SAY THE FOLLOWING WORDS SLOWLY AT 1-SECOND INTERVALS - BALL/ CAR/ MAN [ITERATIONS FOR REPEAT ADMINISTRATION]: 0
WHAT YEAR IS THIS?: 0
WHAT MONTH IS THIS?: 0
WHAT COUNTRY ARE WE IN?: 0
SAY: FOLD THE PAPER IN HALF ONCE WITH BOTH HANDS, SCORE IF PAPER IS CORRECTLY FOLDED IN HALF.: 0
SAY: PUT THE PAPER DOWN ON THE FLOOR, SCORE IF PAPER IS PLACED BACK ON FLOOR: 0
WHAT STATE [OR PROVINCE] ARE WE IN?: 0
SUM ALL MMSE QUESTIONS FOR TOTAL SCORE [OUT OF 30].: 0

## 2023-08-16 ASSESSMENT — PATIENT HEALTH QUESTIONNAIRE - PHQ9
SUM OF ALL RESPONSES TO PHQ QUESTIONS 1-9: 0
SUM OF ALL RESPONSES TO PHQ QUESTIONS 1-9: 0
2. FEELING DOWN, DEPRESSED OR HOPELESS: 0
SUM OF ALL RESPONSES TO PHQ9 QUESTIONS 1 & 2: 0
1. LITTLE INTEREST OR PLEASURE IN DOING THINGS: 0
SUM OF ALL RESPONSES TO PHQ QUESTIONS 1-9: 0
SUM OF ALL RESPONSES TO PHQ QUESTIONS 1-9: 0

## 2023-08-16 NOTE — PROGRESS NOTES
blood glucose test strips (ASCENSIA AUTODISC VI;ONE TOUCH ULTRA TEST VI) strip 1 each by In Vitro route daily Check BG 4 times daily. Patient needs accucheck Yes KAMI Monteiro - NP   Glucosamine-Chondroitin (GLUCOSAMINE CHONDR COMPLEX PO) Take by mouth Yes Historical Provider, MD   losartan (COZAAR) 25 MG tablet Take 1 tablet by mouth daily TAKE 1 TABLET DAILY Yes Rachelle Pickett MD   ascorbic acid (VITAMIN C) 250 MG tablet Take by mouth daily Yes Ar Automatic Reconciliation   Cholecalciferol 50 MCG (2000 UT) TABS Take by mouth Yes Ar Automatic Reconciliation   colchicine (COLCRYS) 0.6 MG tablet Two p.o. at onset of gout attack then one p.o. every hour to a maximum of five or N/V or diarrhea Yes Ar Automatic Reconciliation   tamsulosin (FLOMAX) 0.4 MG capsule TAKE 1 CAPSULE DAILY Yes Ar Automatic Reconciliation       CareTeam (Including outside providers/suppliers regularly involved in providing care):   Patient Care Team:  Brandon Gudino DO as PCP - [address] [place] [address] Christos AYOUB DO as PCP - [facility] Provider     Reviewed and updated this visit:  Allergies          Otherwise normal routine physical exam we will set him up for his baseline labs again he is being followed by endocrinology so he has not had his thyroid or his A1c done he is apparently scheduled to see his diabetic educator/NP we will get lab work supportive care given we will call him back with the rest of his lab work and discussion pending those results and plan supportive care given. I have spent a total of 8-15 minutes assessing, reviewing, and discussing the depression screening with patient in office today.

## 2023-08-17 ENCOUNTER — NURSE ONLY (OUTPATIENT)
Dept: FAMILY MEDICINE CLINIC | Facility: CLINIC | Age: 68
End: 2023-08-17
Payer: COMMERCIAL

## 2023-08-17 DIAGNOSIS — Z79.4 TYPE 2 DIABETES MELLITUS WITH HYPERGLYCEMIA, WITH LONG-TERM CURRENT USE OF INSULIN (HCC): ICD-10-CM

## 2023-08-17 DIAGNOSIS — E78.2 MIXED HYPERLIPIDEMIA: ICD-10-CM

## 2023-08-17 DIAGNOSIS — Z12.5 SPECIAL SCREENING FOR MALIGNANT NEOPLASM OF PROSTATE: ICD-10-CM

## 2023-08-17 DIAGNOSIS — I10 ESSENTIAL HYPERTENSION: ICD-10-CM

## 2023-08-17 DIAGNOSIS — C73 THYROID CANCER (HCC): ICD-10-CM

## 2023-08-17 DIAGNOSIS — E55.9 VITAMIN D DEFICIENCY, UNSPECIFIED: ICD-10-CM

## 2023-08-17 DIAGNOSIS — E11.65 TYPE 2 DIABETES MELLITUS WITH HYPERGLYCEMIA, WITH LONG-TERM CURRENT USE OF INSULIN (HCC): ICD-10-CM

## 2023-08-17 LAB
25(OH)D3 SERPL-MCNC: 40.7 NG/ML (ref 30–100)
ALBUMIN SERPL-MCNC: 3.5 G/DL (ref 3.2–4.6)
ALBUMIN/GLOB SERPL: 1.1 (ref 0.4–1.6)
ALP SERPL-CCNC: 71 U/L (ref 50–136)
ALT SERPL-CCNC: 22 U/L (ref 12–65)
ANION GAP SERPL CALC-SCNC: 7 MMOL/L (ref 2–11)
AST SERPL-CCNC: 10 U/L (ref 15–37)
BASOPHILS # BLD: 0.1 K/UL (ref 0–0.2)
BASOPHILS NFR BLD: 1 % (ref 0–2)
BILIRUB SERPL-MCNC: 0.9 MG/DL (ref 0.2–1.1)
BILIRUBIN, URINE, POC: NEGATIVE
BLOOD URINE, POC: NEGATIVE
BUN SERPL-MCNC: 24 MG/DL (ref 8–23)
CALCIUM SERPL-MCNC: 8.8 MG/DL (ref 8.3–10.4)
CHLORIDE SERPL-SCNC: 108 MMOL/L (ref 101–110)
CHOLEST SERPL-MCNC: 199 MG/DL
CO2 SERPL-SCNC: 27 MMOL/L (ref 21–32)
CREAT SERPL-MCNC: 1.7 MG/DL (ref 0.8–1.5)
DIFFERENTIAL METHOD BLD: ABNORMAL
EOSINOPHIL # BLD: 0.4 K/UL (ref 0–0.8)
EOSINOPHIL NFR BLD: 4 % (ref 0.5–7.8)
ERYTHROCYTE [DISTWIDTH] IN BLOOD BY AUTOMATED COUNT: 19.5 % (ref 11.9–14.6)
EST. AVERAGE GLUCOSE BLD GHB EST-MCNC: 220 MG/DL
GLOBULIN SER CALC-MCNC: 3.2 G/DL (ref 2.8–4.5)
GLUCOSE SERPL-MCNC: 291 MG/DL (ref 65–100)
GLUCOSE URINE, POC: ABNORMAL
HBA1C MFR BLD: 9.3 % (ref 4.8–5.6)
HCT VFR BLD AUTO: 36 % (ref 41.1–50.3)
HDLC SERPL-MCNC: 47 MG/DL (ref 40–60)
HDLC SERPL: 4.2
HGB BLD-MCNC: 10.6 G/DL (ref 13.6–17.2)
IMM GRANULOCYTES # BLD AUTO: 0 K/UL (ref 0–0.5)
IMM GRANULOCYTES NFR BLD AUTO: 0 % (ref 0–5)
KETONES, URINE, POC: NEGATIVE
LDLC SERPL CALC-MCNC: 111.8 MG/DL
LEUKOCYTE ESTERASE, URINE, POC: NEGATIVE
LYMPHOCYTES # BLD: 1.9 K/UL (ref 0.5–4.6)
LYMPHOCYTES NFR BLD: 20 % (ref 13–44)
MCH RBC QN AUTO: 18.9 PG (ref 26.1–32.9)
MCHC RBC AUTO-ENTMCNC: 29.4 G/DL (ref 31.4–35)
MCV RBC AUTO: 64.2 FL (ref 82–102)
MICROALB/CREAT RATIO, POC: ABNORMAL
MICROALBUMIN URINE, POC: 100 MG/L
MONOCYTES # BLD: 0.6 K/UL (ref 0.1–1.3)
MONOCYTES NFR BLD: 6 % (ref 4–12)
NEUTS SEG # BLD: 6.6 K/UL (ref 1.7–8.2)
NEUTS SEG NFR BLD: 69 % (ref 43–78)
NITRITE, URINE, POC: NEGATIVE
NRBC # BLD: 0 K/UL (ref 0–0.2)
PH, URINE, POC: 5.5 (ref 4.6–8)
PLATELET # BLD AUTO: 196 K/UL (ref 150–450)
PMV BLD AUTO: ABNORMAL FL (ref 9.4–12.3)
POTASSIUM SERPL-SCNC: 4.3 MMOL/L (ref 3.5–5.1)
PROT SERPL-MCNC: 6.7 G/DL (ref 6.3–8.2)
PROTEIN,URINE, POC: ABNORMAL
PSA SERPL-MCNC: 2.7 NG/ML
RBC # BLD AUTO: 5.61 M/UL (ref 4.23–5.6)
SODIUM SERPL-SCNC: 142 MMOL/L (ref 133–143)
SPECIFIC GRAVITY, URINE, POC: 1.02 (ref 1–1.03)
T4 FREE SERPL-MCNC: 1 NG/DL (ref 0.78–1.46)
TRIGL SERPL-MCNC: 201 MG/DL (ref 35–150)
TSH W FREE THYROID IF ABNORMAL: 17.4 UIU/ML (ref 0.36–3.74)
URINALYSIS CLARITY, POC: CLEAR
URINALYSIS COLOR, POC: YELLOW
UROBILINOGEN, POC: NORMAL
VLDLC SERPL CALC-MCNC: 40.2 MG/DL (ref 6–23)
WBC # BLD AUTO: 9.5 K/UL (ref 4.3–11.1)

## 2023-08-17 PROCEDURE — 82043 UR ALBUMIN QUANTITATIVE: CPT | Performed by: FAMILY MEDICINE

## 2023-08-17 PROCEDURE — 81003 URINALYSIS AUTO W/O SCOPE: CPT | Performed by: FAMILY MEDICINE

## 2023-08-23 ENCOUNTER — OFFICE VISIT (OUTPATIENT)
Dept: ENDOCRINOLOGY | Age: 68
End: 2023-08-23

## 2023-08-23 VITALS
BODY MASS INDEX: 40.17 KG/M2 | SYSTOLIC BLOOD PRESSURE: 125 MMHG | DIASTOLIC BLOOD PRESSURE: 80 MMHG | HEART RATE: 85 BPM | OXYGEN SATURATION: 100 % | WEIGHT: 272 LBS

## 2023-08-23 DIAGNOSIS — Z79.4 TYPE 2 DIABETES MELLITUS WITH HYPERGLYCEMIA, WITH LONG-TERM CURRENT USE OF INSULIN (HCC): Primary | ICD-10-CM

## 2023-08-23 DIAGNOSIS — E11.69 TYPE 2 DIABETES MELLITUS WITH HYPERCHOLESTEROLEMIA (HCC): Chronic | ICD-10-CM

## 2023-08-23 DIAGNOSIS — E11.65 TYPE 2 DIABETES MELLITUS WITH HYPERGLYCEMIA, WITH LONG-TERM CURRENT USE OF INSULIN (HCC): Primary | ICD-10-CM

## 2023-08-23 DIAGNOSIS — E89.0 POSTSURGICAL HYPOTHYROIDISM: ICD-10-CM

## 2023-08-23 DIAGNOSIS — E78.00 TYPE 2 DIABETES MELLITUS WITH HYPERCHOLESTEROLEMIA (HCC): Chronic | ICD-10-CM

## 2023-08-23 PROBLEM — Z90.5 HISTORY OF NEPHRECTOMY, LEFT: Status: ACTIVE | Noted: 2023-08-23

## 2023-08-23 PROBLEM — Z85.528 HISTORY OF KIDNEY CANCER: Chronic | Status: ACTIVE | Noted: 2022-05-09

## 2023-08-23 PROBLEM — C73 THYROID CANCER (HCC): Status: RESOLVED | Noted: 2021-02-02 | Resolved: 2023-08-23

## 2023-08-23 PROBLEM — Z85.850 HISTORY OF THYROID CANCER: Status: ACTIVE | Noted: 2023-08-23

## 2023-08-23 RX ORDER — ACYCLOVIR 400 MG/1
TABLET ORAL
Qty: 9 EACH | Refills: 3 | Status: SHIPPED | OUTPATIENT
Start: 2023-08-23

## 2023-08-23 ASSESSMENT — ENCOUNTER SYMPTOMS
DIARRHEA: 0
CONSTIPATION: 0

## 2023-08-23 NOTE — PROGRESS NOTES
Conception Steve, [address] [facility]  [address], [address], [address]            Cleo Acosta is seen today for follow up of type 2 diabetes mellitus. ASSESSMENT AND PLAN:  The beneficiary requires a therapeutic CGM for treatment and management of diabetes mellitus. The beneficiary has been using a home blood glucose monitor and performing frequent (4 or more times a day) blood glucose testing. The beneficiary is insulin treated with daily injections of insulin or a continuous subcutaneous insulin infusion pump. 1. Type 2 diabetes mellitus with hyperglycemia, with long-term current use of insulin ([facility])  A1c 9.3%. Glycemic control sub optimal.   Patient reports he only takes his Lantus and his inconsistent about taking Humalog before meals. Additionally, he was on vacation for 2 months and was very inconsistent about taking his medications. He has been more consistent with his Lantus since he got back home. We discussed making medication changes but patient declines at this time. I think U-500 might be a good option for him given the decreased amounts of injections he would have to do during the course of the day. I also think that a GLP-1 would be beneficial.  He declines both of these today. States he does not really like medications. We did discuss CGM as a tool that may help patient be more aware of his blood sugar during the day. Patient is agreeable. We placed a sample sensor today and I will send a prescription to his pharmacy. - Continuous Blood Gluc Sensor (DEXCOM G7 SENSOR) MISC; Use to monitor glucose, change q 10 days E11.65  Dispense: 9 each; Refill: 3    2. Type 2 diabetes mellitus with hypercholesterolemia ([address] [facility])  LDL drawn 08/17/2023 was 111.8, above ADA goal of less than 70. Unfortunately patient was on vacation and has not been consistent with taking his medications for the last 2 months. Continue pravastatin at current dosing.  Recheck

## 2023-08-28 ENCOUNTER — CLINICAL DOCUMENTATION (OUTPATIENT)
Dept: SURGERY | Age: 68
End: 2023-08-28

## 2023-08-28 NOTE — PROGRESS NOTES
Referral received for routine colonoscopy- screening or surveillance only. Chart reviewed by me on May 17, 2022. Prior exam >10 yrs ago    Did not schedule w/referral above.   Chart re-reviewed 8/28/23    Pt found to be acceptable candidate for direct scheduling if they are interested with the following special instructions (if any):

## 2023-11-27 ASSESSMENT — ENCOUNTER SYMPTOMS
DIARRHEA: 0
CONSTIPATION: 0

## 2023-11-27 NOTE — PROGRESS NOTES
at 2022 10:45 AM  Age: 70 years  Sex: Male at 2023 10:13 AM  Has CKD-3 to CKD-5: Yes    Liver:   History of hepatitis: denies; excessive alcohol consumption: denies; Pertinent Lab History:    Hemoglobin A1c:   10/29/2020: 8.3%   2021: 8.5%   2021: 9.3%   2021: 9.1%   2022: 9.2%   2022: 10.5%   06/15/2022: 9.1%   2022: 11.1%   2022: 11.7%   2023: 9.3%    2023: 10.9%    Lipids:    2023: TC: 199; HDL: 47; LDL: 111.8; T; VLDL: 40.2     Renal:    2022: Creatinine: 2.00; eGFR: 36, uACR: 264   2023: Creatinine: 1.70; eGFR: 43    Liver Function:   2023: ALT: 22; AST: 10; Platelets: 122    Miscellaneous Labs:   C-Peptide:    03/10/2017: 5.1 (no concurrent fasting glucose)  ALEJANDRO-65:    2022: <5.0  Znt8:    2022: <15  IA-2 ABS:    2022: <7.5        Diagnosis: Bilateral thyroid cancer status post total thyroidectomy , status post CHILDERS-131  in [facility]. He had a recurrence in right cervical lymph nodes in  status post right lateral neck dissection. Surgical complications: none    Prior Treatment: denies     Current Regimen:  275 mcg branded Synthroid    They are taking their medication in the morning on an empty stomach with a full glass of water and no other medications or supplements. They wait approximately 60 before having anything to eat or drink. Pertinent labs:   10/29/2020: TSH: 0.031   2021: TSH: 0.005, Free T4: 2.20; TG: <0.1; TPO: <1.0   2021: TSH: <0.005, Free T4: 2.54   2021: TSH: <0.005   2021: TSH: 1.420, TG: <0.1; TPO: <1.0   2022: TSH: 9.550   06/15/2022: TSH: 4.20, Free T4: 1.3   2022: TSH: 20.100   2022: TSH: 37.600, Free T4: 1.0   01/10/2023: TSH: 0.063, Free T4: 1.7   2023: TSH: 17.40, Free T4: 1.0     Imaging:  Neck ultrasound 2021: No pathologic adenopathy.     Review of Systems   Constitutional:  Negative for appetite

## 2023-11-28 ENCOUNTER — OFFICE VISIT (OUTPATIENT)
Dept: ENDOCRINOLOGY | Age: 68
End: 2023-11-28
Payer: COMMERCIAL

## 2023-11-28 VITALS — DIASTOLIC BLOOD PRESSURE: 90 MMHG | WEIGHT: 271 LBS | BODY MASS INDEX: 40.02 KG/M2 | SYSTOLIC BLOOD PRESSURE: 140 MMHG

## 2023-11-28 DIAGNOSIS — E89.0 POSTSURGICAL HYPOTHYROIDISM: Chronic | ICD-10-CM

## 2023-11-28 DIAGNOSIS — E78.00 TYPE 2 DIABETES MELLITUS WITH HYPERCHOLESTEROLEMIA (HCC): Chronic | ICD-10-CM

## 2023-11-28 DIAGNOSIS — E11.69 TYPE 2 DIABETES MELLITUS WITH HYPERCHOLESTEROLEMIA (HCC): Chronic | ICD-10-CM

## 2023-11-28 DIAGNOSIS — Z79.4 TYPE 2 DIABETES MELLITUS WITH HYPERGLYCEMIA, WITH LONG-TERM CURRENT USE OF INSULIN (HCC): Primary | ICD-10-CM

## 2023-11-28 DIAGNOSIS — Z79.4 TYPE 2 DIABETES MELLITUS WITH HYPERGLYCEMIA, WITH LONG-TERM CURRENT USE OF INSULIN (HCC): ICD-10-CM

## 2023-11-28 DIAGNOSIS — E11.65 TYPE 2 DIABETES MELLITUS WITH HYPERGLYCEMIA, WITH LONG-TERM CURRENT USE OF INSULIN (HCC): Primary | ICD-10-CM

## 2023-11-28 DIAGNOSIS — E11.65 TYPE 2 DIABETES MELLITUS WITH HYPERGLYCEMIA, WITH LONG-TERM CURRENT USE OF INSULIN (HCC): ICD-10-CM

## 2023-11-28 DIAGNOSIS — I15.2 HYPERTENSION ASSOCIATED WITH TYPE 2 DIABETES MELLITUS (HCC): Chronic | ICD-10-CM

## 2023-11-28 DIAGNOSIS — E11.59 HYPERTENSION ASSOCIATED WITH TYPE 2 DIABETES MELLITUS (HCC): Chronic | ICD-10-CM

## 2023-11-28 LAB
BASOPHILS # BLD: 0.1 K/UL (ref 0–0.2)
BASOPHILS NFR BLD: 1 % (ref 0–2)
DIFFERENTIAL METHOD BLD: ABNORMAL
EOSINOPHIL # BLD: 0.3 K/UL (ref 0–0.8)
EOSINOPHIL NFR BLD: 3 % (ref 0.5–7.8)
ERYTHROCYTE [DISTWIDTH] IN BLOOD BY AUTOMATED COUNT: 18.7 % (ref 11.9–14.6)
HCT VFR BLD AUTO: 40.5 % (ref 41.1–50.3)
HGB BLD-MCNC: 12.1 G/DL (ref 13.6–17.2)
IMM GRANULOCYTES # BLD AUTO: 0 K/UL (ref 0–0.5)
IMM GRANULOCYTES NFR BLD AUTO: 0 % (ref 0–5)
LYMPHOCYTES # BLD: 2.3 K/UL (ref 0.5–4.6)
LYMPHOCYTES NFR BLD: 23 % (ref 13–44)
MCH RBC QN AUTO: 19 PG (ref 26.1–32.9)
MCHC RBC AUTO-ENTMCNC: 29.9 G/DL (ref 31.4–35)
MCV RBC AUTO: 63.6 FL (ref 82–102)
MONOCYTES # BLD: 0.7 K/UL (ref 0.1–1.3)
MONOCYTES NFR BLD: 7 % (ref 4–12)
NEUTS SEG # BLD: 6.6 K/UL (ref 1.7–8.2)
NEUTS SEG NFR BLD: 66 % (ref 43–78)
NRBC # BLD: 0 K/UL (ref 0–0.2)
PLATELET # BLD AUTO: 265 K/UL (ref 150–450)
PMV BLD AUTO: ABNORMAL FL (ref 9.4–12.3)
RBC # BLD AUTO: 6.37 M/UL (ref 4.23–5.6)
WBC # BLD AUTO: 10 K/UL (ref 4.3–11.1)

## 2023-11-28 PROCEDURE — 3080F DIAST BP >= 90 MM HG: CPT | Performed by: NURSE PRACTITIONER

## 2023-11-28 PROCEDURE — 2022F DILAT RTA XM EVC RTNOPTHY: CPT | Performed by: NURSE PRACTITIONER

## 2023-11-28 PROCEDURE — G8427 DOCREV CUR MEDS BY ELIG CLIN: HCPCS | Performed by: NURSE PRACTITIONER

## 2023-11-28 PROCEDURE — 3046F HEMOGLOBIN A1C LEVEL >9.0%: CPT | Performed by: NURSE PRACTITIONER

## 2023-11-28 PROCEDURE — 99214 OFFICE O/P EST MOD 30 MIN: CPT | Performed by: NURSE PRACTITIONER

## 2023-11-28 PROCEDURE — G8484 FLU IMMUNIZE NO ADMIN: HCPCS | Performed by: NURSE PRACTITIONER

## 2023-11-28 PROCEDURE — 3077F SYST BP >= 140 MM HG: CPT | Performed by: NURSE PRACTITIONER

## 2023-11-28 PROCEDURE — 3017F COLORECTAL CA SCREEN DOC REV: CPT | Performed by: NURSE PRACTITIONER

## 2023-11-28 PROCEDURE — 1123F ACP DISCUSS/DSCN MKR DOCD: CPT | Performed by: NURSE PRACTITIONER

## 2023-11-28 PROCEDURE — 1036F TOBACCO NON-USER: CPT | Performed by: NURSE PRACTITIONER

## 2023-11-28 PROCEDURE — 83036 HEMOGLOBIN GLYCOSYLATED A1C: CPT | Performed by: NURSE PRACTITIONER

## 2023-11-28 PROCEDURE — G8417 CALC BMI ABV UP PARAM F/U: HCPCS | Performed by: NURSE PRACTITIONER

## 2023-11-28 RX ORDER — TIRZEPATIDE 2.5 MG/.5ML
2.5 INJECTION, SOLUTION SUBCUTANEOUS WEEKLY
Qty: 2 ML | Refills: 0 | Status: SHIPPED | OUTPATIENT
Start: 2023-11-28

## 2023-11-28 RX ORDER — TIRZEPATIDE 5 MG/.5ML
5 INJECTION, SOLUTION SUBCUTANEOUS WEEKLY
Qty: 2 ML | Refills: 0 | Status: SHIPPED | OUTPATIENT
Start: 2023-11-28

## 2023-11-28 RX ORDER — TIRZEPATIDE 7.5 MG/.5ML
7.5 INJECTION, SOLUTION SUBCUTANEOUS WEEKLY
Qty: 2 ML | Refills: 1 | Status: SHIPPED | OUTPATIENT
Start: 2023-11-28

## 2023-11-29 LAB
ALBUMIN SERPL-MCNC: 3.8 G/DL (ref 3.2–4.6)
ALBUMIN/GLOB SERPL: 1.1 (ref 0.4–1.6)
ALP SERPL-CCNC: 84 U/L (ref 50–136)
ALT SERPL-CCNC: 23 U/L (ref 12–65)
ANION GAP SERPL CALC-SCNC: 10 MMOL/L (ref 2–11)
AST SERPL-CCNC: 12 U/L (ref 15–37)
BILIRUB SERPL-MCNC: 0.8 MG/DL (ref 0.2–1.1)
BUN SERPL-MCNC: 37 MG/DL (ref 8–23)
CALCIUM SERPL-MCNC: 9.7 MG/DL (ref 8.3–10.4)
CHLORIDE SERPL-SCNC: 107 MMOL/L (ref 101–110)
CHOLEST SERPL-MCNC: 224 MG/DL
CO2 SERPL-SCNC: 22 MMOL/L (ref 21–32)
CREAT SERPL-MCNC: 2.1 MG/DL (ref 0.8–1.5)
GLOBULIN SER CALC-MCNC: 3.6 G/DL (ref 2.8–4.5)
GLUCOSE SERPL-MCNC: 341 MG/DL (ref 65–100)
HBA1C MFR BLD: 10.9 %
HDLC SERPL-MCNC: 45 MG/DL (ref 40–60)
HDLC SERPL: 5
LDLC SERPL CALC-MCNC: 144.6 MG/DL
POTASSIUM SERPL-SCNC: 4.8 MMOL/L (ref 3.5–5.1)
PROT SERPL-MCNC: 7.4 G/DL (ref 6.3–8.2)
SODIUM SERPL-SCNC: 139 MMOL/L (ref 133–143)
T4 FREE SERPL-MCNC: 1.7 NG/DL (ref 0.78–1.46)
TRIGL SERPL-MCNC: 172 MG/DL (ref 35–150)
TSH, 3RD GENERATION: 0.09 UIU/ML (ref 0.36–3.74)
VLDLC SERPL CALC-MCNC: 34.4 MG/DL (ref 6–23)

## 2023-11-30 DIAGNOSIS — E89.0 POSTSURGICAL HYPOTHYROIDISM: ICD-10-CM

## 2023-11-30 DIAGNOSIS — E78.00 TYPE 2 DIABETES MELLITUS WITH HYPERCHOLESTEROLEMIA (HCC): Primary | ICD-10-CM

## 2023-11-30 DIAGNOSIS — E11.69 TYPE 2 DIABETES MELLITUS WITH HYPERCHOLESTEROLEMIA (HCC): Primary | ICD-10-CM

## 2023-11-30 RX ORDER — LEVOTHYROXINE SODIUM 0.2 MG/1
200 TABLET ORAL
Qty: 90 TABLET | Refills: 3 | Status: SHIPPED | OUTPATIENT
Start: 2023-11-30

## 2023-11-30 RX ORDER — LEVOTHYROXINE SODIUM 0.05 MG/1
50 TABLET ORAL
Qty: 90 TABLET | Refills: 3 | Status: SHIPPED | OUTPATIENT
Start: 2023-11-30

## 2023-11-30 RX ORDER — PRAVASTATIN SODIUM 80 MG/1
80 TABLET ORAL DAILY
Qty: 90 TABLET | Refills: 3 | Status: SHIPPED | OUTPATIENT
Start: 2023-11-30

## 2023-12-04 DIAGNOSIS — E89.0 POSTSURGICAL HYPOTHYROIDISM: ICD-10-CM

## 2023-12-04 RX ORDER — LEVOTHYROXINE SODIUM 0.05 MG/1
50 TABLET ORAL
Qty: 90 TABLET | Refills: 3 | Status: SHIPPED | OUTPATIENT
Start: 2023-12-04

## 2023-12-04 RX ORDER — LEVOTHYROXINE SODIUM 0.2 MG/1
200 TABLET ORAL
Qty: 90 TABLET | Refills: 3 | Status: SHIPPED | OUTPATIENT
Start: 2023-12-04

## 2024-01-29 DIAGNOSIS — E11.65 TYPE 2 DIABETES MELLITUS WITH HYPERGLYCEMIA, WITH LONG-TERM CURRENT USE OF INSULIN (HCC): ICD-10-CM

## 2024-01-29 DIAGNOSIS — E89.0 POSTSURGICAL HYPOTHYROIDISM: ICD-10-CM

## 2024-01-29 DIAGNOSIS — Z79.4 TYPE 2 DIABETES MELLITUS WITH HYPERGLYCEMIA, WITH LONG-TERM CURRENT USE OF INSULIN (HCC): ICD-10-CM

## 2024-01-29 RX ORDER — LEVOTHYROXINE SODIUM 0.05 MG/1
50 TABLET ORAL
Qty: 90 TABLET | Refills: 3 | Status: SHIPPED | OUTPATIENT
Start: 2024-01-29

## 2024-01-29 RX ORDER — LEVOTHYROXINE SODIUM 0.2 MG/1
200 TABLET ORAL
Qty: 90 TABLET | Refills: 3 | Status: SHIPPED | OUTPATIENT
Start: 2024-01-29

## 2024-01-29 RX ORDER — ACYCLOVIR 400 MG/1
TABLET ORAL
Qty: 9 EACH | Refills: 3 | Status: SHIPPED | OUTPATIENT
Start: 2024-01-29

## 2024-01-29 NOTE — TELEPHONE ENCOUNTER
Spoke with patient about sensors.    He stated when I spoke with him he needs refills on Synthroid as well to [facility].

## 2024-02-13 DIAGNOSIS — Z79.4 TYPE 2 DIABETES MELLITUS WITH HYPERGLYCEMIA, WITH LONG-TERM CURRENT USE OF INSULIN (HCC): ICD-10-CM

## 2024-02-13 DIAGNOSIS — E11.65 TYPE 2 DIABETES MELLITUS WITH HYPERGLYCEMIA, WITH LONG-TERM CURRENT USE OF INSULIN (HCC): ICD-10-CM

## 2024-02-13 DIAGNOSIS — E89.0 POSTSURGICAL HYPOTHYROIDISM: ICD-10-CM

## 2024-02-13 DIAGNOSIS — R53.82 CHRONIC FATIGUE: ICD-10-CM

## 2024-02-13 DIAGNOSIS — N52.9 ERECTILE DYSFUNCTION, UNSPECIFIED ERECTILE DYSFUNCTION TYPE: ICD-10-CM

## 2024-02-13 LAB
CREAT UR-MCNC: 97 MG/DL
MICROALBUMIN UR-MCNC: 55 MG/DL
MICROALBUMIN/CREAT UR-RTO: 567 MG/G (ref 0–30)

## 2024-02-14 LAB
T4 FREE SERPL-MCNC: 1.7 NG/DL (ref 0.78–1.46)
TSH, 3RD GENERATION: 0.05 UIU/ML (ref 0.36–3.74)

## 2024-02-20 LAB
TESTOST FREE SERPL-MCNC: 6.5 PG/ML (ref 6.6–18.1)
TESTOST SERPL-MCNC: 312 NG/DL (ref 264–916)

## 2024-02-27 ASSESSMENT — ENCOUNTER SYMPTOMS
DIARRHEA: 0
CONSTIPATION: 0

## 2024-02-27 NOTE — PROGRESS NOTES
[facility], APRN-[facility]  [address], [address]  [place] [address]            Pete Hamm is seen today for follow up of type 2 diabetes mellitus.      ASSESSMENT AND PLAN:  Interpretation of 72 hour glucose monitor:  At least 72 hours of data were reviewed.  The patient utilizes a Dexcom G7 continuous glucose monitoring system.  The average glucose during the reviewed timeframe was 263 with a standard deviation of 62 (time in range 11%, time above range 89%, time below range 0%).     1. Type 2 diabetes mellitus with hyperglycemia, with long-term current use of insulin (HCC)  A1c 10.4%, TIR 11%, GMI: 9.6% (from 12/23-01/21). Glycemic control poor. No change since last visit.   Counseled patient on importance of taking his insulin as prescribed. Will resend Mounjaro to [facility] and have the clinical staff call to find out what happened in November and why they haven't shipped it to him. Sent new rx for Dexcom as well.   At today's visit we discussed sequela associated with uncontrolled diabetes including increased risk of stroke, heart attack, kidney failure, amputation, retinopathy, neuropathy, and nephropathy.  Patient was strongly encouraged to comply with treatment regimen as well as dietary and exercise recommendations to aid in glycemic control to help prevent complications associated with uncontrolled diabetes.    - AMB POC HEMOGLOBIN A1C  - Continuous Blood Gluc Sensor (DEXCOM G7 SENSOR) MISC; Use to monitor glucose, change q 10 days E11.65  Dispense: 9 each; Refill: 3  - MOUNJARO 2.5 MG/0.5ML SOPN SC injection; Inject 0.5 mLs into the skin once a week Increase to 5 mg weekly after 4 weeks.  Dispense: 2 mL; Refill: 0  - MOUNJARO 5 MG/0.5ML SOPN SC injection; Inject 0.5 mLs into the skin once a week Start after 4 weeks of 2.5 mg dose.  Dispense: 2 mL; Refill: 0  - OR CONTINUOUS GLUCOSE MONITORING ANALYSIS I&R    2. Postsurgical hypothyroidism  Over treated.

## 2024-02-28 ENCOUNTER — OFFICE VISIT (OUTPATIENT)
Dept: ENDOCRINOLOGY | Age: 69
End: 2024-02-28
Payer: COMMERCIAL

## 2024-02-28 VITALS
SYSTOLIC BLOOD PRESSURE: 146 MMHG | WEIGHT: 275 LBS | DIASTOLIC BLOOD PRESSURE: 84 MMHG | HEIGHT: 69 IN | OXYGEN SATURATION: 97 % | RESPIRATION RATE: 18 BRPM | BODY MASS INDEX: 40.73 KG/M2 | HEART RATE: 84 BPM

## 2024-02-28 DIAGNOSIS — E78.00 TYPE 2 DIABETES MELLITUS WITH HYPERCHOLESTEROLEMIA (HCC): ICD-10-CM

## 2024-02-28 DIAGNOSIS — E89.0 POSTSURGICAL HYPOTHYROIDISM: ICD-10-CM

## 2024-02-28 DIAGNOSIS — E11.65 TYPE 2 DIABETES MELLITUS WITH HYPERGLYCEMIA, WITH LONG-TERM CURRENT USE OF INSULIN (HCC): Primary | ICD-10-CM

## 2024-02-28 DIAGNOSIS — Z79.4 TYPE 2 DIABETES MELLITUS WITH HYPERGLYCEMIA, WITH LONG-TERM CURRENT USE OF INSULIN (HCC): Primary | ICD-10-CM

## 2024-02-28 DIAGNOSIS — E11.69 TYPE 2 DIABETES MELLITUS WITH HYPERCHOLESTEROLEMIA (HCC): ICD-10-CM

## 2024-02-28 LAB — HBA1C MFR BLD: 10.4 %

## 2024-02-28 PROCEDURE — 3017F COLORECTAL CA SCREEN DOC REV: CPT | Performed by: NURSE PRACTITIONER

## 2024-02-28 PROCEDURE — 3046F HEMOGLOBIN A1C LEVEL >9.0%: CPT | Performed by: NURSE PRACTITIONER

## 2024-02-28 PROCEDURE — 83036 HEMOGLOBIN GLYCOSYLATED A1C: CPT | Performed by: NURSE PRACTITIONER

## 2024-02-28 PROCEDURE — G8427 DOCREV CUR MEDS BY ELIG CLIN: HCPCS | Performed by: NURSE PRACTITIONER

## 2024-02-28 PROCEDURE — 3077F SYST BP >= 140 MM HG: CPT | Performed by: NURSE PRACTITIONER

## 2024-02-28 PROCEDURE — 95251 CONT GLUC MNTR ANALYSIS I&R: CPT | Performed by: NURSE PRACTITIONER

## 2024-02-28 PROCEDURE — 2022F DILAT RTA XM EVC RTNOPTHY: CPT | Performed by: NURSE PRACTITIONER

## 2024-02-28 PROCEDURE — 1036F TOBACCO NON-USER: CPT | Performed by: NURSE PRACTITIONER

## 2024-02-28 PROCEDURE — 1123F ACP DISCUSS/DSCN MKR DOCD: CPT | Performed by: NURSE PRACTITIONER

## 2024-02-28 PROCEDURE — 3079F DIAST BP 80-89 MM HG: CPT | Performed by: NURSE PRACTITIONER

## 2024-02-28 PROCEDURE — 99214 OFFICE O/P EST MOD 30 MIN: CPT | Performed by: NURSE PRACTITIONER

## 2024-02-28 PROCEDURE — G8417 CALC BMI ABV UP PARAM F/U: HCPCS | Performed by: NURSE PRACTITIONER

## 2024-02-28 PROCEDURE — G8484 FLU IMMUNIZE NO ADMIN: HCPCS | Performed by: NURSE PRACTITIONER

## 2024-02-28 RX ORDER — PRAVASTATIN SODIUM 80 MG/1
80 TABLET ORAL DAILY
Qty: 90 TABLET | Refills: 3 | Status: SHIPPED | OUTPATIENT
Start: 2024-02-28

## 2024-02-28 RX ORDER — TIRZEPATIDE 2.5 MG/.5ML
2.5 INJECTION, SOLUTION SUBCUTANEOUS WEEKLY
Qty: 2 ML | Refills: 0 | Status: SHIPPED | OUTPATIENT
Start: 2024-02-28

## 2024-02-28 RX ORDER — ACYCLOVIR 400 MG/1
TABLET ORAL
Qty: 9 EACH | Refills: 3 | Status: SHIPPED | OUTPATIENT
Start: 2024-02-28

## 2024-02-28 RX ORDER — LEVOTHYROXINE SODIUM 0.05 MG/1
50 TABLET ORAL
Qty: 90 TABLET | Refills: 3 | Status: SHIPPED | OUTPATIENT
Start: 2024-02-28

## 2024-02-28 RX ORDER — TIRZEPATIDE 5 MG/.5ML
5 INJECTION, SOLUTION SUBCUTANEOUS WEEKLY
Qty: 2 ML | Refills: 0 | Status: SHIPPED | OUTPATIENT
Start: 2024-02-28

## 2024-02-28 RX ORDER — LEVOTHYROXINE SODIUM 0.2 MG/1
200 TABLET ORAL
Qty: 90 TABLET | Refills: 3 | Status: SHIPPED | OUTPATIENT
Start: 2024-02-28

## 2024-02-29 ENCOUNTER — TELEPHONE (OUTPATIENT)
Dept: ENDOCRINOLOGY | Age: 69
End: 2024-02-29

## 2024-03-05 ENCOUNTER — TELEPHONE (OUTPATIENT)
Dept: FAMILY MEDICINE CLINIC | Facility: CLINIC | Age: 69
End: 2024-03-05

## 2024-03-05 NOTE — TELEPHONE ENCOUNTER
Patient was a no show on 03/01/2024,I called him to rescheduled and check if he was ok, patient stated he forgot about the appointment. I offered him another appointment for 04/04/2024 which he agreed to come in.

## 2024-04-03 ENCOUNTER — TELEPHONE (OUTPATIENT)
Dept: FAMILY MEDICINE CLINIC | Facility: CLINIC | Age: 69
End: 2024-04-03

## 2024-04-03 NOTE — TELEPHONE ENCOUNTER
Comprehensive Nutrition Assessment    Type and Reason for Visit:  Reassess (tubefeeding and diet)    Nutrition Recommendations/Plan:   1. Continue nocturnal tubefeeding: Nepro 55 ml/hr x 12 hours per day (6pm-6am). 30 mls before and 30 mls after. Additional free water flush per MD  2. Diet per SLP/MD  3. Continue Magic Cup TID. Malnutrition Assessment:  Malnutrition Status: At risk for malnutrition (Comment) (04/12/22 1012)    Context:  Chronic Illness     Findings of the 6 clinical characteristics of malnutrition:  Energy Intake:  No significant decrease in energy intake (Patient has been receiving tubefeedings since 11/24/21, meeting projected nutritional needs and tolerating at [facility] prior to admit)  Weight Loss:   (Hard to assess with CHF, hx of HD (none since 12/23/21). Note relatively stable weight since 1/20/22, note weight down~ 18.1% in the last 11 months)     Body Fat Loss:  No significant body fat loss     Muscle Mass Loss:  1 - Mild muscle mass loss Temples (temporalis)  Fluid Accumulation:  1 - Mild Extremities   Strength:  Not Performed    Nutrition Assessment:     Pt stable from a nutritional standpoint AEB tolerating nocturnal tube feeding which provides ~63% estimated daily caloric needs however oral intake remains inadequate.  Remains at risk for further nutritional compromise r/t dysphagia, continued poor oral intake, increased nutrient needs to support wound healing, on supplemental EN prior to admit, admit with [facility] on CKD, blood from nephrostomy tube (obstructive staghorn calculus), anemia, chronic CHF, and underlying medical condition (hx: previous lengthy hospital course: [facility] admit 10/27 - 12/1/21 for pneumonia then [facility] [place], then back to [facility] with [facility]; vent/trach-(removed), dysphagia d/t prolonged intubations, PEG 11/24/21, hx HD (none since 12/23/21), wound debridements, 1/27/22: diverting colostomy, renal calculi, left nephrostomy tubes, HTN, OA, Spoke with patient to confirm appointment tomorrow 10:30am. Patient verbalized understanding.   Patient states he never checks his email so he will not confirm appointments via email. He wants a reminder via text message. His cell phone confirmed number, is starred so he should get text message with confirmation of appointment. Patient states he didn't receive one.    depression, thrombocytopenia, gout attack, CHF, ECF reported Covid in 10/2021).       Nutrition Related Findings:      Wound Type:  (stage 4 sacrum, right and left second toe blisters)     Pt. Report/Treatments/Miscellaneous: Patient seen earlier this morning, reports eating mainly all of a yogurt, some magic cup and pudding at meals. Tolerating nocturnal tubefeeding per pt and RN. RN reports planning dialysis today. GI Status: 250 mls colostomy output 4/25/22, denies any abdominal pain or nausea at present  Pertinent Labs: 4/26/22: Sodium 133, Potassium 5, BUN 82, Creatinine 4.2, Glucose 97, Chemstick 110, Hgb 7.5  Pertinent Meds: amoxil, bumex held, cipro IV, iron      Current Nutrition Intake & Therapies:    Average Meal Intake: 1-25% (mainly just yogurt at meals, bites of magic cup, etc)     ADULT DIET; Dysphagia - Soft and Bite Sized; Moderately Thick (Honey); No Drinking Straws  ADULT ORAL NUTRITION SUPPLEMENT; Breakfast, Lunch, Dinner; Frozen Oral Supplement  ADULT TUBE FEEDING; PEG; Renal Formula; Cyclic; 55; 2:86 PM; 2:49 AM; 30; Other (specify); 30 mls before and after cyclic feeding  Current Tube Feeding (TF) Orders:  · Feeding Route: PEG (Placed 11/24/21)  · Formula: Renal Formula (Nepro)  · Schedule: Cyclic (6EO-5JR)  · Water Flushes: 30 mls before and 30 mls after cyclic feeding, additional free water flush per Provider  · At [facility]: Nepro at 40 ml/hr x 24 hours/day, 1 oz prostat bolused daily, 300 mls free water flush every 8 hours~ 1799 kcals (1699 TF, 100 prostat), 93 grams protein (78 TF, 15 prostat), 164 grams CHO (154 TF, 10 prostat), 24 grams fiber, 1890 mls volume (960 TF, 30 prostat, 900 flushes) per 24 hours  · Goal TF & Flush Orders Provides: Nepro at 55 ml/hr x 12 hours per day (6pm-6am)~ 1168 kcals (~ 87-97% of estimated daily needs), 53 grams protein, 106 grams CHO, 16.5 grams fiber, 480 mls water (540 mls with flushes) in 660 mls (720 mls with flushes) per 24 hours.     Anthropometric Behavior,Nutrition Focused Physical Findings,Skin,Weight    Discharge Planning:     Too soon to determine     Romario Atkins RD, LD  Contact: [phone]

## 2024-04-04 ENCOUNTER — OFFICE VISIT (OUTPATIENT)
Dept: FAMILY MEDICINE CLINIC | Facility: CLINIC | Age: 69
End: 2024-04-04

## 2024-04-04 VITALS
OXYGEN SATURATION: 98 % | DIASTOLIC BLOOD PRESSURE: 70 MMHG | HEIGHT: 69 IN | WEIGHT: 272 LBS | BODY MASS INDEX: 40.29 KG/M2 | SYSTOLIC BLOOD PRESSURE: 110 MMHG | HEART RATE: 80 BPM

## 2024-04-04 DIAGNOSIS — E66.01 MORBID (SEVERE) OBESITY DUE TO EXCESS CALORIES (HCC): Primary | ICD-10-CM

## 2024-04-04 ASSESSMENT — ENCOUNTER SYMPTOMS
VOMITING: 0
SHORTNESS OF BREATH: 0
NAUSEA: 0

## 2024-04-04 ASSESSMENT — PATIENT HEALTH QUESTIONNAIRE - PHQ9
2. FEELING DOWN, DEPRESSED OR HOPELESS: NOT AT ALL
SUM OF ALL RESPONSES TO PHQ9 QUESTIONS 1 & 2: 0
SUM OF ALL RESPONSES TO PHQ QUESTIONS 1-9: 0
1. LITTLE INTEREST OR PLEASURE IN DOING THINGS: NOT AT ALL
SUM OF ALL RESPONSES TO PHQ QUESTIONS 1-9: 0
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
## 2024-04-08 ENCOUNTER — TELEPHONE (OUTPATIENT)
Dept: FAMILY MEDICINE CLINIC | Facility: CLINIC | Age: 69
End: 2024-04-08

## 2024-04-08 DIAGNOSIS — Z79.4 TYPE 2 DIABETES MELLITUS WITH HYPERGLYCEMIA, WITH LONG-TERM CURRENT USE OF INSULIN (HCC): ICD-10-CM

## 2024-04-08 DIAGNOSIS — E11.65 TYPE 2 DIABETES MELLITUS WITH HYPERGLYCEMIA, WITH LONG-TERM CURRENT USE OF INSULIN (HCC): ICD-10-CM

## 2024-04-08 RX ORDER — INSULIN GLARGINE 100 [IU]/ML
INJECTION, SOLUTION SUBCUTANEOUS
Qty: 5 ADJUSTABLE DOSE PRE-FILLED PEN SYRINGE | Refills: 1 | Status: SHIPPED | OUTPATIENT
Start: 2024-04-08

## 2024-04-08 NOTE — TELEPHONE ENCOUNTER
Pt called needing a refill on his lantus and pen needles. He stated that he called this morning and hasn't heard from us so he was a bit upset. He stated that he just needs a refill sent in before he runs out. He has an appointment on 4/30/24.

## 2024-04-09 DIAGNOSIS — E89.0 POSTSURGICAL HYPOTHYROIDISM: ICD-10-CM

## 2024-04-09 RX ORDER — LEVOTHYROXINE SODIUM 0.07 MG/1
TABLET ORAL
Qty: 90 TABLET | Refills: 3 | OUTPATIENT
Start: 2024-04-09

## 2024-04-29 ASSESSMENT — ENCOUNTER SYMPTOMS
CONSTIPATION: 0
DIARRHEA: 0

## 2024-04-29 NOTE — PROGRESS NOTES
[facility], APRN-[facility]  [address], [address]  [place] [address]            Pete Hamm is seen today for follow up of type 2 diabetes mellitus.      ASSESSMENT AND PLAN:  Interpretation of 72 hour glucose monitor:  At least 72 hours of data were reviewed.  The patient utilizes a Dexcom G7 continuous glucose monitoring system.  The average glucose during the reviewed timeframe was 270 with a coefficient of variation of 21.5 (time in range 6%, time above range 94%, time below range 0%).     1. Type 2 diabetes mellitus with hyperglycemia, with long-term current use of insulin (HCC)  A1c 10.8%, TIR 6%, GMI: 9.8%. Glycemic control poor. No change since last visit.   Increase Lantus to TDD of 84 units and split into BID dosing. Asked patient to try to remember to take his meal time insulin. Lowered dose to 6 units TID AC with 3/50>150 SSI as I suspect 15 units before meals would cause hypoglycemia. Advised to continue titrating basal insulin by 2 units every 3 days to keep fasting glucose between  mg/dL. I still think he would benefit from GLP1 and asked him to continue to request Mounjaro from his pharmacy.     - AMB POC HEMOGLOBIN A1C  - LANTUS SOLOSTAR 100 UNIT/ML injection pen; Inject 42 Units into the skin 2 times daily Adjust dose by 2 units every 3 days to keep fasting glucose between  mg/dL. Max daily dose: 100 units  Dispense: 72 mL; Refill: 3  - HUMALOG KWIKPEN 100 UNIT/ML SOPN; Inject 6 Units into the skin 3 times daily (before meals) Add correction scale 3/50>150. Max daily dose: 100 units  Dispense: 30 mL; Refill: 3  - [place] CONTINUOUS GLUCOSE MONITORING ANALYSIS I&R    2. Essential hypertension  Normotensive in clinic today with blood pressure below ADA goal of less than 130/80. Continue antihypertensives at current dose. Refills sent as requested.     - losartan (COZAAR) 25 MG tablet; Take 1 tablet by mouth daily TAKE 1 TABLET DAILY  Dispense: 90

## 2024-04-30 ENCOUNTER — OFFICE VISIT (OUTPATIENT)
Dept: ENDOCRINOLOGY | Age: 69
End: 2024-04-30
Payer: COMMERCIAL

## 2024-04-30 VITALS
HEART RATE: 74 BPM | SYSTOLIC BLOOD PRESSURE: 110 MMHG | DIASTOLIC BLOOD PRESSURE: 74 MMHG | WEIGHT: 270 LBS | BODY MASS INDEX: 39.87 KG/M2

## 2024-04-30 DIAGNOSIS — Z79.4 TYPE 2 DIABETES MELLITUS WITH HYPERGLYCEMIA, WITH LONG-TERM CURRENT USE OF INSULIN (HCC): Primary | ICD-10-CM

## 2024-04-30 DIAGNOSIS — I10 ESSENTIAL HYPERTENSION: ICD-10-CM

## 2024-04-30 DIAGNOSIS — E11.65 TYPE 2 DIABETES MELLITUS WITH HYPERGLYCEMIA, WITH LONG-TERM CURRENT USE OF INSULIN (HCC): Primary | ICD-10-CM

## 2024-04-30 LAB — HBA1C MFR BLD: 10.8 %

## 2024-04-30 PROCEDURE — 83036 HEMOGLOBIN GLYCOSYLATED A1C: CPT | Performed by: NURSE PRACTITIONER

## 2024-04-30 PROCEDURE — 3046F HEMOGLOBIN A1C LEVEL >9.0%: CPT | Performed by: NURSE PRACTITIONER

## 2024-04-30 PROCEDURE — 1123F ACP DISCUSS/DSCN MKR DOCD: CPT | Performed by: NURSE PRACTITIONER

## 2024-04-30 PROCEDURE — 2022F DILAT RTA XM EVC RTNOPTHY: CPT | Performed by: NURSE PRACTITIONER

## 2024-04-30 PROCEDURE — 3074F SYST BP LT 130 MM HG: CPT | Performed by: NURSE PRACTITIONER

## 2024-04-30 PROCEDURE — 95251 CONT GLUC MNTR ANALYSIS I&R: CPT | Performed by: NURSE PRACTITIONER

## 2024-04-30 PROCEDURE — G8417 CALC BMI ABV UP PARAM F/U: HCPCS | Performed by: NURSE PRACTITIONER

## 2024-04-30 PROCEDURE — 3078F DIAST BP <80 MM HG: CPT | Performed by: NURSE PRACTITIONER

## 2024-04-30 PROCEDURE — 1036F TOBACCO NON-USER: CPT | Performed by: NURSE PRACTITIONER

## 2024-04-30 PROCEDURE — G8427 DOCREV CUR MEDS BY ELIG CLIN: HCPCS | Performed by: NURSE PRACTITIONER

## 2024-04-30 PROCEDURE — 99214 OFFICE O/P EST MOD 30 MIN: CPT | Performed by: NURSE PRACTITIONER

## 2024-04-30 PROCEDURE — 3017F COLORECTAL CA SCREEN DOC REV: CPT | Performed by: NURSE PRACTITIONER

## 2024-04-30 RX ORDER — INSULIN LISPRO 100 [IU]/ML
6 INJECTION, SOLUTION INTRAVENOUS; SUBCUTANEOUS
Qty: 30 ML | Refills: 3
Start: 2024-04-30

## 2024-04-30 RX ORDER — LOSARTAN POTASSIUM 25 MG/1
25 TABLET ORAL DAILY
Qty: 90 TABLET | Refills: 3 | Status: SHIPPED | OUTPATIENT
Start: 2024-04-30

## 2024-04-30 RX ORDER — INSULIN GLARGINE 100 [IU]/ML
42 INJECTION, SOLUTION SUBCUTANEOUS 2 TIMES DAILY
Qty: 72 ML | Refills: 3
Start: 2024-04-30

## 2024-08-01 NOTE — PROGRESS NOTES
[facility], [facility]  [address], [address]  [place] [address]            Pete Hamm is seen today for follow up of type 2 diabetes mellitus.      ASSESSMENT AND PLAN:    Interpretation of 72 hour glucose monitor:  At least 72 hours of data were reviewed.  The patient utilizes a Dexcom G7 continuous glucose monitoring system.  The average glucose during the reviewed timeframe was 241 with a coefficient of variation of 24.4% (time in range 14%, time above range 86%, time below range 0%).     1. Type 2 diabetes mellitus with hyperglycemia, with long-term current use of insulin (HCC)  A1c 9.1%, GMI: 9.1%. Glycemic control poor with A1c above goal of less than 7%.  Discussed options with patient. Will increase Lantus to 100 units TDD and split into BID dosing. Patient was able to verbalize understanding of plan. He will also see if his pharmacy has Mounjaro now.     - AMB POC HEMOGLOBIN A1C  - LANTUS SOLOSTAR 100 UNIT/ML injection pen; Inject 50 Units into the skin 2 times daily Adjust dose by 2 units every 3 days to keep fasting glucose between  mg/dL. Max daily dose: 100 units  Dispense: 72 mL; Refill: 3  - [place] CONTINUOUS GLUCOSE MONITORING ANALYSIS I&R        Return in about 3 months (around 11/5/2024).     History of Present Illness:  Interim medical updates: has been using Humalog a little more often, but only for correction. Did not adjust his dose of Lantus as instructed at last visit.     Barriers to care: none identified    Date of diagnosis: ~2000    Patient has no history of previous DKA episodes.  Denies HX gastroparesis, foot ulcer  He has a pancreatic cyst, but no history of pancreatitis.  Hx of anemia.    Current Regimen: Humalog 6 units TID AC w/ 3/50>150 SSI (only uses when his blood sugar is really night, usually in the morning or in the early afternoon; 2-3 times a week); Lantus 75 units q AM (sometimes takes 80 if the pen is almost out), takes

## 2024-08-05 ENCOUNTER — OFFICE VISIT (OUTPATIENT)
Dept: ENDOCRINOLOGY | Age: 69
End: 2024-08-05
Payer: COMMERCIAL

## 2024-08-05 VITALS
WEIGHT: 270 LBS | HEART RATE: 70 BPM | BODY MASS INDEX: 39.87 KG/M2 | SYSTOLIC BLOOD PRESSURE: 112 MMHG | DIASTOLIC BLOOD PRESSURE: 74 MMHG

## 2024-08-05 DIAGNOSIS — E11.65 TYPE 2 DIABETES MELLITUS WITH HYPERGLYCEMIA, WITH LONG-TERM CURRENT USE OF INSULIN (HCC): Primary | ICD-10-CM

## 2024-08-05 DIAGNOSIS — Z79.4 TYPE 2 DIABETES MELLITUS WITH HYPERGLYCEMIA, WITH LONG-TERM CURRENT USE OF INSULIN (HCC): Primary | ICD-10-CM

## 2024-08-05 LAB — HBA1C MFR BLD: 9.1 %

## 2024-08-05 PROCEDURE — 95251 CONT GLUC MNTR ANALYSIS I&R: CPT | Performed by: NURSE PRACTITIONER

## 2024-08-05 PROCEDURE — G8417 CALC BMI ABV UP PARAM F/U: HCPCS | Performed by: NURSE PRACTITIONER

## 2024-08-05 PROCEDURE — 3046F HEMOGLOBIN A1C LEVEL >9.0%: CPT | Performed by: NURSE PRACTITIONER

## 2024-08-05 PROCEDURE — 1036F TOBACCO NON-USER: CPT | Performed by: NURSE PRACTITIONER

## 2024-08-05 PROCEDURE — 99214 OFFICE O/P EST MOD 30 MIN: CPT | Performed by: NURSE PRACTITIONER

## 2024-08-05 PROCEDURE — 1123F ACP DISCUSS/DSCN MKR DOCD: CPT | Performed by: NURSE PRACTITIONER

## 2024-08-05 PROCEDURE — 3074F SYST BP LT 130 MM HG: CPT | Performed by: NURSE PRACTITIONER

## 2024-08-05 PROCEDURE — G8427 DOCREV CUR MEDS BY ELIG CLIN: HCPCS | Performed by: NURSE PRACTITIONER

## 2024-08-05 PROCEDURE — 83036 HEMOGLOBIN GLYCOSYLATED A1C: CPT | Performed by: NURSE PRACTITIONER

## 2024-08-05 PROCEDURE — 3017F COLORECTAL CA SCREEN DOC REV: CPT | Performed by: NURSE PRACTITIONER

## 2024-08-05 PROCEDURE — 3078F DIAST BP <80 MM HG: CPT | Performed by: NURSE PRACTITIONER

## 2024-08-05 PROCEDURE — 2022F DILAT RTA XM EVC RTNOPTHY: CPT | Performed by: NURSE PRACTITIONER

## 2024-08-05 RX ORDER — INSULIN GLARGINE 100 [IU]/ML
50 INJECTION, SOLUTION SUBCUTANEOUS 2 TIMES DAILY
Qty: 72 ML | Refills: 3
Start: 2024-08-05

## 2024-08-19 ENCOUNTER — TELEPHONE (OUTPATIENT)
Dept: ENDOCRINOLOGY | Age: 69
End: 2024-08-19

## 2024-08-19 NOTE — TELEPHONE ENCOUNTER
Please call patient and see if he is taking Lantus 50 units BID? If so, please ask him to increase to 55 units BID.     Teri Kamara, APRN - CNP

## 2024-08-21 DIAGNOSIS — N40.0 BENIGN PROSTATIC HYPERPLASIA WITHOUT LOWER URINARY TRACT SYMPTOMS: ICD-10-CM

## 2024-08-22 ENCOUNTER — TELEPHONE (OUTPATIENT)
Dept: ENDOCRINOLOGY | Age: 69
End: 2024-08-22

## 2024-08-22 RX ORDER — TAMSULOSIN HYDROCHLORIDE 0.4 MG/1
0.4 CAPSULE ORAL DAILY
Qty: 90 CAPSULE | Refills: 0 | Status: SHIPPED | OUTPATIENT
Start: 2024-08-22

## 2024-08-22 NOTE — TELEPHONE ENCOUNTER
Pt's pharmacy [facility] called stating they need some info. They received an RX for mounjaro and have concerns due to some of his past chart info. PT has in his records a history of malignant neoplasm of thyroid . Lymph nodes of head, face and neck. [facility] stating this drug is contraindicated for this DX. Please advise.

## 2024-08-23 ENCOUNTER — TELEPHONE (OUTPATIENT)
Dept: ENDOCRINOLOGY | Age: 69
End: 2024-08-23

## 2024-08-23 NOTE — TELEPHONE ENCOUNTER
Patient called and stated that [facility] notified him that they will not fill his RX for Dexcom G7 Sensor. Patient request call back.

## 2024-08-26 NOTE — TELEPHONE ENCOUNTER
Patient has had total thyroidectomy and is not at risk for thyroid cancer at this time. Additionally, the thyroid cancer associated with GLP1 is very rare and while I do not have access to the pathology from 2008, I have low suspicion for medullary thyroid cancer. Please let [facility] know that I have counseled Mr. Hamm on these risks and he is still interested in proceeding. Please tell them to dispense the drug.     Teri Kamara, APRN - CNP

## 2024-09-04 DIAGNOSIS — E11.65 TYPE 2 DIABETES MELLITUS WITH HYPERGLYCEMIA, WITH LONG-TERM CURRENT USE OF INSULIN (HCC): ICD-10-CM

## 2024-09-04 DIAGNOSIS — Z79.4 TYPE 2 DIABETES MELLITUS WITH HYPERGLYCEMIA, WITH LONG-TERM CURRENT USE OF INSULIN (HCC): ICD-10-CM

## 2024-09-25 DIAGNOSIS — I10 ESSENTIAL HYPERTENSION: ICD-10-CM

## 2024-09-25 RX ORDER — HYDROCHLOROTHIAZIDE 25 MG/1
25 TABLET ORAL DAILY
Qty: 90 TABLET | Refills: 3 | OUTPATIENT
Start: 2024-09-25

## 2024-10-06 DIAGNOSIS — E11.65 TYPE 2 DIABETES MELLITUS WITH HYPERGLYCEMIA, WITH LONG-TERM CURRENT USE OF INSULIN (HCC): ICD-10-CM

## 2024-10-06 DIAGNOSIS — Z79.4 TYPE 2 DIABETES MELLITUS WITH HYPERGLYCEMIA, WITH LONG-TERM CURRENT USE OF INSULIN (HCC): ICD-10-CM

## 2024-10-09 DIAGNOSIS — Z79.4 TYPE 2 DIABETES MELLITUS WITH HYPERGLYCEMIA, WITH LONG-TERM CURRENT USE OF INSULIN (HCC): ICD-10-CM

## 2024-10-09 DIAGNOSIS — E11.65 TYPE 2 DIABETES MELLITUS WITH HYPERGLYCEMIA, WITH LONG-TERM CURRENT USE OF INSULIN (HCC): ICD-10-CM

## 2024-10-09 NOTE — TELEPHONE ENCOUNTER
Pt left message on voicemail that he needs a refill on his Mounjaro 5 mg sent to [facility]    Please review pending Rx it has no refills.

## 2024-10-10 RX ORDER — TIRZEPATIDE 5 MG/.5ML
5 INJECTION, SOLUTION SUBCUTANEOUS WEEKLY
Qty: 2 ML | Refills: 1 | Status: SHIPPED | OUTPATIENT
Start: 2024-10-10

## 2024-11-05 DIAGNOSIS — N40.0 BENIGN PROSTATIC HYPERPLASIA WITHOUT LOWER URINARY TRACT SYMPTOMS: ICD-10-CM

## 2024-11-05 RX ORDER — TAMSULOSIN HYDROCHLORIDE 0.4 MG/1
0.4 CAPSULE ORAL DAILY
Qty: 90 CAPSULE | Refills: 3 | OUTPATIENT
Start: 2024-11-05

## 2024-11-12 ASSESSMENT — ENCOUNTER SYMPTOMS
CONSTIPATION: 0
DIARRHEA: 0

## 2024-11-12 NOTE — PROGRESS NOTES
Synthroid (has not been taking this as he reports he never received the 50 mcg dose. Has been taking 275-300)    They are taking their medication in the morning on an empty stomach with a full glass of water and no other medications or supplements. They wait approximately 60 before having anything to eat or drink.    Pertinent labs:   10/29/2020: TSH: 0.031   02/02/2021: TSH: 0.005, Free T4: 2.20; TG: <0.1; TPO: <1.0   03/26/2021: TSH: <0.005, Free T4: 2.54   06/16/2021: TSH: <0.005   11/11/2021: TSH: 1.420, TG: <0.1; TPO: <1.0   03/02/2022: TSH: 9.550   06/15/2022: TSH: 4.20, Free T4: 1.3   08/24/2022: TSH: 20.100   11/02/2022: TSH: 37.600, Free T4: 1.0   01/10/2023: TSH: 0.063, Free T4: 1.7   08/17/2023: TSH: 17.40, Free T4: 1.0   11/28/2023: TSH: 0.086, Free T4: 1.7   02/13/2024: TSH: 0.05, Free T4: 1.7    Imaging:  Neck ultrasound 2/11/2021: No pathologic adenopathy.    Review of Systems   Constitutional:  Negative for appetite change, fatigue and unexpected weight change.   Eyes:  Negative for visual disturbance.   Cardiovascular:  Negative for palpitations.   Gastrointestinal:  Negative for constipation and diarrhea.   Endocrine: Negative for polydipsia, polyphagia and polyuria.   Skin:  Negative for rash and wound.   Neurological:  Negative for dizziness and light-headedness.   Psychiatric/Behavioral:  Negative for dysphoric mood and sleep disturbance. The patient is not nervous/anxious.        BP (!) 148/80   Pulse 93   Temp 97.8 °F (36.6 °C) (Temporal)   Resp 18   Ht 1.753 m (5' 9\")   Wt 121.3 kg (267 lb 6.4 oz)   SpO2 96%   BMI 39.49 kg/m²     Wt Readings from Last 3 Encounters:   11/13/24 121.3 kg (267 lb 6.4 oz)   08/05/24 122.5 kg (270 lb)   04/30/24 122.5 kg (270 lb)     Body weight trend: stable    Physical Exam  Constitutional:       Appearance: Normal appearance.   HENT:      Head: Normocephalic.   Neck:      Thyroid: No thyroid mass, thyromegaly or thyroid tenderness.   Cardiovascular:

## 2024-11-13 ENCOUNTER — OFFICE VISIT (OUTPATIENT)
Dept: ENDOCRINOLOGY | Age: 69
End: 2024-11-13
Payer: COMMERCIAL

## 2024-11-13 VITALS
SYSTOLIC BLOOD PRESSURE: 148 MMHG | HEART RATE: 93 BPM | HEIGHT: 69 IN | WEIGHT: 267.4 LBS | TEMPERATURE: 97.8 F | OXYGEN SATURATION: 96 % | DIASTOLIC BLOOD PRESSURE: 80 MMHG | RESPIRATION RATE: 18 BRPM | BODY MASS INDEX: 39.6 KG/M2

## 2024-11-13 DIAGNOSIS — Z79.4 TYPE 2 DIABETES MELLITUS WITH HYPERGLYCEMIA, WITH LONG-TERM CURRENT USE OF INSULIN (HCC): Primary | ICD-10-CM

## 2024-11-13 DIAGNOSIS — E11.65 TYPE 2 DIABETES MELLITUS WITH HYPERGLYCEMIA, WITH LONG-TERM CURRENT USE OF INSULIN (HCC): Primary | ICD-10-CM

## 2024-11-13 LAB — HBA1C MFR BLD: 8.8 %

## 2024-11-13 PROCEDURE — 3077F SYST BP >= 140 MM HG: CPT | Performed by: NURSE PRACTITIONER

## 2024-11-13 PROCEDURE — 99214 OFFICE O/P EST MOD 30 MIN: CPT | Performed by: NURSE PRACTITIONER

## 2024-11-13 PROCEDURE — 95251 CONT GLUC MNTR ANALYSIS I&R: CPT | Performed by: NURSE PRACTITIONER

## 2024-11-13 PROCEDURE — 3046F HEMOGLOBIN A1C LEVEL >9.0%: CPT | Performed by: NURSE PRACTITIONER

## 2024-11-13 PROCEDURE — 83036 HEMOGLOBIN GLYCOSYLATED A1C: CPT | Performed by: NURSE PRACTITIONER

## 2024-11-13 PROCEDURE — G8484 FLU IMMUNIZE NO ADMIN: HCPCS | Performed by: NURSE PRACTITIONER

## 2024-11-13 PROCEDURE — 1123F ACP DISCUSS/DSCN MKR DOCD: CPT | Performed by: NURSE PRACTITIONER

## 2024-11-13 PROCEDURE — G8417 CALC BMI ABV UP PARAM F/U: HCPCS | Performed by: NURSE PRACTITIONER

## 2024-11-13 PROCEDURE — 1036F TOBACCO NON-USER: CPT | Performed by: NURSE PRACTITIONER

## 2024-11-13 PROCEDURE — 2022F DILAT RTA XM EVC RTNOPTHY: CPT | Performed by: NURSE PRACTITIONER

## 2024-11-13 PROCEDURE — 3079F DIAST BP 80-89 MM HG: CPT | Performed by: NURSE PRACTITIONER

## 2024-11-13 PROCEDURE — 3017F COLORECTAL CA SCREEN DOC REV: CPT | Performed by: NURSE PRACTITIONER

## 2024-11-13 PROCEDURE — G8427 DOCREV CUR MEDS BY ELIG CLIN: HCPCS | Performed by: NURSE PRACTITIONER

## 2024-11-13 RX ORDER — INSULIN GLARGINE 100 [IU]/ML
50 INJECTION, SOLUTION SUBCUTANEOUS NIGHTLY
Qty: 72 ML | Refills: 3 | Status: SHIPPED
Start: 2024-11-13

## 2024-11-13 ASSESSMENT — PATIENT HEALTH QUESTIONNAIRE - PHQ9
SUM OF ALL RESPONSES TO PHQ QUESTIONS 1-9: 0
1. LITTLE INTEREST OR PLEASURE IN DOING THINGS: NOT AT ALL
SUM OF ALL RESPONSES TO PHQ QUESTIONS 1-9: 0
SUM OF ALL RESPONSES TO PHQ9 QUESTIONS 1 & 2: 0
2. FEELING DOWN, DEPRESSED OR HOPELESS: NOT AT ALL

## 2024-11-13 NOTE — ASSESSMENT & PLAN NOTE
A1c 8.8%, GMI: 9.1%. Glycemic control sub-optimal with A1c above goal of less than 7%. I suspect that we didn't see much change in his control because he stopped taking his Lantus and is only using Humalog when his glucose is over 300.   Discussed options. He would like to keep his Mounjaro dose at 5 mg and return to using Lantus once daily. Will have him take 50 units at night.   Advised to continue titrating basal insulin by 2 units every 3 days to keep fasting glucose between  mg/dL.  Due for routine lab work. Orders placed today.   Foot exam reveals no concerns. Counseled on appropriate foot care including examining their feet daily, avoiding activities that can injure feet, walking barefoot or wearing open toed shoes, and using care when trimming nails.

## 2024-12-16 DIAGNOSIS — Z79.4 TYPE 2 DIABETES MELLITUS WITH HYPERGLYCEMIA, WITH LONG-TERM CURRENT USE OF INSULIN (HCC): ICD-10-CM

## 2024-12-16 DIAGNOSIS — E11.65 TYPE 2 DIABETES MELLITUS WITH HYPERGLYCEMIA, WITH LONG-TERM CURRENT USE OF INSULIN (HCC): ICD-10-CM

## 2024-12-17 RX ORDER — TIRZEPATIDE 5 MG/.5ML
INJECTION, SOLUTION SUBCUTANEOUS
Qty: 2 ML | Refills: 12 | Status: SHIPPED | OUTPATIENT
Start: 2024-12-17

## 2025-01-20 ENCOUNTER — OFFICE VISIT (OUTPATIENT)
Dept: FAMILY MEDICINE CLINIC | Facility: CLINIC | Age: 70
End: 2025-01-20
Payer: COMMERCIAL

## 2025-01-20 VITALS
SYSTOLIC BLOOD PRESSURE: 126 MMHG | HEART RATE: 93 BPM | DIASTOLIC BLOOD PRESSURE: 76 MMHG | BODY MASS INDEX: 40.14 KG/M2 | HEIGHT: 69 IN | WEIGHT: 271 LBS

## 2025-01-20 DIAGNOSIS — Z79.4 TYPE 2 DIABETES MELLITUS WITH STAGE 3B CHRONIC KIDNEY DISEASE, WITH LONG-TERM CURRENT USE OF INSULIN (HCC): ICD-10-CM

## 2025-01-20 DIAGNOSIS — C64.2 MALIGNANT NEOPLASM OF LEFT KIDNEY, EXCEPT RENAL PELVIS (HCC): ICD-10-CM

## 2025-01-20 DIAGNOSIS — E66.01 MORBID (SEVERE) OBESITY DUE TO EXCESS CALORIES: ICD-10-CM

## 2025-01-20 DIAGNOSIS — E11.22 TYPE 2 DIABETES MELLITUS WITH STAGE 3B CHRONIC KIDNEY DISEASE, WITH LONG-TERM CURRENT USE OF INSULIN (HCC): ICD-10-CM

## 2025-01-20 DIAGNOSIS — N18.31 CHRONIC KIDNEY DISEASE, STAGE 3A (HCC): ICD-10-CM

## 2025-01-20 DIAGNOSIS — Z79.4 TYPE 2 DIABETES MELLITUS WITH HYPERGLYCEMIA, WITH LONG-TERM CURRENT USE OF INSULIN (HCC): ICD-10-CM

## 2025-01-20 DIAGNOSIS — E11.65 TYPE 2 DIABETES MELLITUS WITH HYPERGLYCEMIA, WITH LONG-TERM CURRENT USE OF INSULIN (HCC): ICD-10-CM

## 2025-01-20 DIAGNOSIS — M1A.4320 OTHER SECONDARY CHRONIC GOUT OF LEFT WRIST WITHOUT TOPHUS: Primary | ICD-10-CM

## 2025-01-20 DIAGNOSIS — N18.32 TYPE 2 DIABETES MELLITUS WITH STAGE 3B CHRONIC KIDNEY DISEASE, WITH LONG-TERM CURRENT USE OF INSULIN (HCC): ICD-10-CM

## 2025-01-20 LAB
ALBUMIN SERPL-MCNC: 3.5 G/DL (ref 3.2–4.6)
ALBUMIN/GLOB SERPL: 1.1 (ref 1–1.9)
ALP SERPL-CCNC: 77 U/L (ref 40–129)
ALT SERPL-CCNC: 21 U/L (ref 8–55)
ANION GAP SERPL CALC-SCNC: 12 MMOL/L (ref 7–16)
AST SERPL-CCNC: 17 U/L (ref 15–37)
BASOPHILS # BLD: 0.06 K/UL (ref 0–0.2)
BASOPHILS NFR BLD: 0.6 % (ref 0–2)
BILIRUB SERPL-MCNC: 0.9 MG/DL (ref 0–1.2)
BUN SERPL-MCNC: 24 MG/DL (ref 8–23)
CALCIUM SERPL-MCNC: 8.3 MG/DL (ref 8.8–10.2)
CHLORIDE SERPL-SCNC: 105 MMOL/L (ref 98–107)
CHOLEST SERPL-MCNC: 165 MG/DL (ref 0–200)
CO2 SERPL-SCNC: 24 MMOL/L (ref 20–29)
CREAT SERPL-MCNC: 1.63 MG/DL (ref 0.8–1.3)
CREAT UR-MCNC: 155 MG/DL (ref 39–259)
DIFFERENTIAL METHOD BLD: ABNORMAL
EOSINOPHIL # BLD: 0.27 K/UL (ref 0–0.8)
EOSINOPHIL NFR BLD: 2.5 % (ref 0.5–7.8)
ERYTHROCYTE [DISTWIDTH] IN BLOOD BY AUTOMATED COUNT: 18.3 % (ref 11.9–14.6)
GLOBULIN SER CALC-MCNC: 3.1 G/DL (ref 2.3–3.5)
GLUCOSE SERPL-MCNC: 263 MG/DL (ref 70–99)
HCT VFR BLD AUTO: 37.3 % (ref 41.1–50.3)
HDLC SERPL-MCNC: 39 MG/DL (ref 40–60)
HDLC SERPL: 4.3 (ref 0–5)
HGB BLD-MCNC: 11.2 G/DL (ref 13.6–17.2)
IMM GRANULOCYTES # BLD AUTO: 0.04 K/UL (ref 0–0.5)
IMM GRANULOCYTES NFR BLD AUTO: 0.4 % (ref 0–5)
LDLC SERPL CALC-MCNC: 97 MG/DL (ref 0–100)
LYMPHOCYTES # BLD: 1.42 K/UL (ref 0.5–4.6)
LYMPHOCYTES NFR BLD: 13.1 % (ref 13–44)
MCH RBC QN AUTO: 19 PG (ref 26.1–32.9)
MCHC RBC AUTO-ENTMCNC: 30 G/DL (ref 31.4–35)
MCV RBC AUTO: 63.3 FL (ref 82–102)
MICROALBUMIN UR-MCNC: 102 MG/DL (ref 0–20)
MICROALBUMIN/CREAT UR-RTO: 658 MG/G (ref 0–30)
MONOCYTES # BLD: 0.9 K/UL (ref 0.1–1.3)
MONOCYTES NFR BLD: 8.3 % (ref 4–12)
NEUTS SEG # BLD: 8.14 K/UL (ref 1.7–8.2)
NEUTS SEG NFR BLD: 75.1 % (ref 43–78)
NRBC # BLD: 0 K/UL (ref 0–0.2)
PLATELET # BLD AUTO: 203 K/UL (ref 150–450)
PMV BLD AUTO: ABNORMAL FL (ref 9.4–12.3)
POTASSIUM SERPL-SCNC: 4.5 MMOL/L (ref 3.5–5.1)
PROT SERPL-MCNC: 6.5 G/DL (ref 6.3–8.2)
RBC # BLD AUTO: 5.89 M/UL (ref 4.23–5.6)
SODIUM SERPL-SCNC: 141 MMOL/L (ref 136–145)
T4 FREE SERPL-MCNC: 2.2 NG/DL (ref 0.9–1.7)
TRIGL SERPL-MCNC: 147 MG/DL (ref 0–150)
TSH W FREE THYROID IF ABNORMAL: 0.05 UIU/ML (ref 0.27–4.2)
VLDLC SERPL CALC-MCNC: 29 MG/DL (ref 6–23)
WBC # BLD AUTO: 10.8 K/UL (ref 4.3–11.1)

## 2025-01-20 PROCEDURE — G8417 CALC BMI ABV UP PARAM F/U: HCPCS | Performed by: FAMILY MEDICINE

## 2025-01-20 PROCEDURE — 96372 THER/PROPH/DIAG INJ SC/IM: CPT | Performed by: FAMILY MEDICINE

## 2025-01-20 PROCEDURE — 3074F SYST BP LT 130 MM HG: CPT | Performed by: FAMILY MEDICINE

## 2025-01-20 PROCEDURE — 3017F COLORECTAL CA SCREEN DOC REV: CPT | Performed by: FAMILY MEDICINE

## 2025-01-20 PROCEDURE — 1123F ACP DISCUSS/DSCN MKR DOCD: CPT | Performed by: FAMILY MEDICINE

## 2025-01-20 PROCEDURE — 99213 OFFICE O/P EST LOW 20 MIN: CPT | Performed by: FAMILY MEDICINE

## 2025-01-20 PROCEDURE — 3078F DIAST BP <80 MM HG: CPT | Performed by: FAMILY MEDICINE

## 2025-01-20 PROCEDURE — 3046F HEMOGLOBIN A1C LEVEL >9.0%: CPT | Performed by: FAMILY MEDICINE

## 2025-01-20 PROCEDURE — 1036F TOBACCO NON-USER: CPT | Performed by: FAMILY MEDICINE

## 2025-01-20 PROCEDURE — G8427 DOCREV CUR MEDS BY ELIG CLIN: HCPCS | Performed by: FAMILY MEDICINE

## 2025-01-20 PROCEDURE — 2022F DILAT RTA XM EVC RTNOPTHY: CPT | Performed by: FAMILY MEDICINE

## 2025-01-20 RX ORDER — COLCHICINE 0.6 MG/1
TABLET ORAL
Qty: 30 TABLET | Refills: 1 | Status: SHIPPED | OUTPATIENT
Start: 2025-01-20

## 2025-01-20 RX ORDER — KETOROLAC TROMETHAMINE 30 MG/ML
60 INJECTION, SOLUTION INTRAMUSCULAR; INTRAVENOUS ONCE
Status: COMPLETED | OUTPATIENT
Start: 2025-01-20 | End: 2025-01-20

## 2025-01-20 RX ADMIN — KETOROLAC TROMETHAMINE 60 MG: 30 INJECTION, SOLUTION INTRAMUSCULAR; INTRAVENOUS at 10:59

## 2025-01-20 SDOH — ECONOMIC STABILITY: FOOD INSECURITY: WITHIN THE PAST 12 MONTHS, THE FOOD YOU BOUGHT JUST DIDN'T LAST AND YOU DIDN'T HAVE MONEY TO GET MORE.: NEVER TRUE

## 2025-01-20 SDOH — ECONOMIC STABILITY: FOOD INSECURITY: WITHIN THE PAST 12 MONTHS, YOU WORRIED THAT YOUR FOOD WOULD RUN OUT BEFORE YOU GOT MONEY TO BUY MORE.: NEVER TRUE

## 2025-01-20 ASSESSMENT — PATIENT HEALTH QUESTIONNAIRE - PHQ9
SUM OF ALL RESPONSES TO PHQ9 QUESTIONS 1 & 2: 0
SUM OF ALL RESPONSES TO PHQ QUESTIONS 1-9: 0
1. LITTLE INTEREST OR PLEASURE IN DOING THINGS: NOT AT ALL
SUM OF ALL RESPONSES TO PHQ QUESTIONS 1-9: 0
2. FEELING DOWN, DEPRESSED OR HOPELESS: NOT AT ALL
SUM OF ALL RESPONSES TO PHQ QUESTIONS 1-9: 0
SUM OF ALL RESPONSES TO PHQ QUESTIONS 1-9: 0

## 2025-01-20 ASSESSMENT — ENCOUNTER SYMPTOMS
NAUSEA: 0
SHORTNESS OF BREATH: 0
VOMITING: 0

## 2025-01-20 NOTE — PROGRESS NOTES
PROGRESS NOTE    SUBJECTIVE:   Pete Hamm is a 69 y.o. male seen for a follow up visit regarding the following chief complaint:     Chief Complaint   Patient presents with    Gout     Patient c/o pain and wants a refill of colchicine           HPI patient presents office complaining of left wrist pain denies any trauma history of gout states he had a couple drinks over the weekend      Past Medical History, Past Surgical History, Family history, Social History, and Medications were all reviewed with the patient today and updated as necessary.       Current Outpatient Medications   Medication Sig Dispense Refill    MOUNJARO 5 MG/0.5ML SOAJ INJECT 0.5 ML UNDER THE SKIN WEEKLY 2 mL 12    LANTUS SOLOSTAR 100 UNIT/ML injection pen Inject 50 Units into the skin nightly Adjust dose by 2 units every 3 days to keep fasting glucose between  mg/dL. Max daily dose: 100 units 72 mL 3    Continuous Glucose Sensor (DEXCOM G7 SENSOR) MISC Use to monitor glucose, change q 10 days E11.65 9 each 3    tamsulosin (FLOMAX) 0.4 MG capsule TAKE 1 CAPSULE DAILY 90 capsule 0    losartan (COZAAR) 25 MG tablet Take 1 tablet by mouth daily TAKE 1 TABLET DAILY 90 tablet 3    HUMALOG KWIKPEN 100 UNIT/ML SOPN Inject 6 Units into the skin 3 times daily (before meals) Add correction scale 3/50>150. Max daily dose: 100 units 30 mL 3    Insulin Pen Needle 32G X 6 MM MISC For use with insulin 5 times daily. 500 each 3    levothyroxine (SYNTHROID) 200 MCG tablet Take 1 tablet by mouth every morning (before breakfast) Total daily dose 250 mcg 90 tablet 3    levothyroxine (SYNTHROID) 50 MCG tablet Take 1 tablet by mouth every morning (before breakfast) Take this with a 200 mcg pill daily for a total dose of 250 mcg every morning. 90 tablet 3    pravastatin (PRAVACHOL) 80 MG tablet Take 1 tablet by mouth daily 90 tablet 3    sildenafil (VIAGRA) 100 MG tablet Take 1 tablet by mouth as needed for Erectile Dysfunction (1/2 to 1 tablet as needed.  Max

## 2025-01-22 DIAGNOSIS — E11.65 TYPE 2 DIABETES MELLITUS WITH HYPERGLYCEMIA, WITH LONG-TERM CURRENT USE OF INSULIN (HCC): ICD-10-CM

## 2025-01-22 DIAGNOSIS — Z79.4 TYPE 2 DIABETES MELLITUS WITH HYPERGLYCEMIA, WITH LONG-TERM CURRENT USE OF INSULIN (HCC): ICD-10-CM

## 2025-01-22 RX ORDER — TIRZEPATIDE 5 MG/.5ML
5 INJECTION, SOLUTION SUBCUTANEOUS WEEKLY
Qty: 6 ML | Refills: 3 | Status: SHIPPED | OUTPATIENT
Start: 2025-01-22

## 2025-02-11 ASSESSMENT — ENCOUNTER SYMPTOMS
CONSTIPATION: 0
DIARRHEA: 0

## 2025-02-11 NOTE — PROGRESS NOTES
[facility], [facility]  [address], [address]  [place] [address]            Pete Hamm is seen today for follow up of type 2 diabetes mellitus.      ASSESSMENT AND PLAN:    Interpretation of 72 hour glucose monitor:  At least 72 hours of data were reviewed.  The patient utilizes a Dexcom G7 continuous glucose monitoring system.  The average glucose during the reviewed timeframe was 236 with a coefficient of variation of 22.7% (time in range 14%, time above range 86%, time below range 0%).       1. Type 2 diabetes mellitus with hyperglycemia, with long-term current use of insulin ([facility])  Assessment & Plan:  A1c 8.4%, GMI: 9.0%. Glycemic control sub-optimal with A1c above goal of less than 7%. Almost no improvement since last visit, likely because he only takes Lantus when his glucose is over 300.   Will have him continue Mounjaro and Lantus at current dosing and add SGLT2i. He's very inconsistent with insulin administration and I'd really like to see if we can manage without it.     Orders:  -     AMB POC HEMOGLOBIN A1C  -     LANTUS SOLOSTAR 100 UNIT/ML injection pen; Inject 50 Units into the skin nightly Adjust dose by 2 units every 3 days to keep fasting glucose between  mg/dL. Max daily dose: 80 units, Disp-72 mL, R-3, DAWNormal  -     JARDIANCE 10 MG tablet; Take 1 tablet by mouth daily, Disp-90 tablet, R-3, DAWNormal  -     GLUCOSE MONITOR, 72 HOUR, PHYS INTERP  2. Postsurgical hypothyroidism  Assessment & Plan:  Patient is over treated on 250 mcg of generic levothyroxine. Decrease dose to 200 mcg Repeat thyroid function tests before next visit.     Orders:  -     levothyroxine (SYNTHROID) 200 MCG tablet; Take 1 tablet by mouth every morning (before breakfast), Disp-90 tablet, R-3Normal  3. CKD stage G3a/A2, GFR 45-59 and albumin creatinine ratio  mg/g ([facility])  Assessment & Plan:  Recommend SGLT2i especially since he only has 1 kidney.           Return in

## 2025-02-12 ENCOUNTER — OFFICE VISIT (OUTPATIENT)
Dept: ENDOCRINOLOGY | Age: 70
End: 2025-02-12

## 2025-02-12 ENCOUNTER — OFFICE VISIT (OUTPATIENT)
Dept: FAMILY MEDICINE CLINIC | Facility: CLINIC | Age: 70
End: 2025-02-12

## 2025-02-12 VITALS
HEIGHT: 69 IN | HEART RATE: 82 BPM | SYSTOLIC BLOOD PRESSURE: 142 MMHG | WEIGHT: 268 LBS | OXYGEN SATURATION: 97 % | RESPIRATION RATE: 18 BRPM | BODY MASS INDEX: 39.69 KG/M2 | DIASTOLIC BLOOD PRESSURE: 82 MMHG

## 2025-02-12 VITALS
WEIGHT: 267 LBS | DIASTOLIC BLOOD PRESSURE: 86 MMHG | HEIGHT: 69 IN | BODY MASS INDEX: 39.55 KG/M2 | SYSTOLIC BLOOD PRESSURE: 138 MMHG | HEART RATE: 87 BPM

## 2025-02-12 DIAGNOSIS — Z79.4 TYPE 2 DIABETES MELLITUS WITH HYPERGLYCEMIA, WITH LONG-TERM CURRENT USE OF INSULIN (HCC): Primary | ICD-10-CM

## 2025-02-12 DIAGNOSIS — E11.65 TYPE 2 DIABETES MELLITUS WITH HYPERGLYCEMIA, WITH LONG-TERM CURRENT USE OF INSULIN (HCC): Primary | ICD-10-CM

## 2025-02-12 DIAGNOSIS — N18.31 CKD STAGE G3A/A2, GFR 45-59 AND ALBUMIN CREATININE RATIO 30-299 MG/G (HCC): ICD-10-CM

## 2025-02-12 DIAGNOSIS — M25.532 LEFT WRIST PAIN: Primary | ICD-10-CM

## 2025-02-12 DIAGNOSIS — E89.0 POSTSURGICAL HYPOTHYROIDISM: ICD-10-CM

## 2025-02-12 PROBLEM — N17.9 ACUTE RENAL FAILURE SUPERIMPOSED ON STAGE 3A CHRONIC KIDNEY DISEASE (HCC): Status: RESOLVED | Noted: 2021-06-15 | Resolved: 2025-02-12

## 2025-02-12 LAB
CRP SERPL-MCNC: 0.8 MG/DL (ref 0–0.4)
ERYTHROCYTE [SEDIMENTATION RATE] IN BLOOD: 10 MM/HR
HBA1C MFR BLD: 8.4 %
URATE SERPL-MCNC: 9.6 MG/DL (ref 3.9–8.2)

## 2025-02-12 RX ORDER — INSULIN GLARGINE 100 [IU]/ML
50 INJECTION, SOLUTION SUBCUTANEOUS NIGHTLY
Qty: 72 ML | Refills: 3 | Status: SHIPPED | OUTPATIENT
Start: 2025-02-12

## 2025-02-12 RX ORDER — EMPAGLIFLOZIN 10 MG/1
10 TABLET, FILM COATED ORAL DAILY
Qty: 90 TABLET | Refills: 3 | Status: SHIPPED | OUTPATIENT
Start: 2025-02-12

## 2025-02-12 RX ORDER — INDOMETHACIN 50 MG/1
50 CAPSULE ORAL 3 TIMES DAILY
Qty: 60 CAPSULE | Refills: 3 | Status: SHIPPED | OUTPATIENT
Start: 2025-02-12

## 2025-02-12 RX ORDER — LEVOTHYROXINE SODIUM 200 UG/1
200 TABLET ORAL
Qty: 90 TABLET | Refills: 3 | Status: SHIPPED | OUTPATIENT
Start: 2025-02-12

## 2025-02-12 ASSESSMENT — ENCOUNTER SYMPTOMS
SHORTNESS OF BREATH: 0
NAUSEA: 0
VOMITING: 0

## 2025-02-12 NOTE — ASSESSMENT & PLAN NOTE
A1c 8.4%, GMI: 9.0%. Glycemic control sub-optimal with A1c above goal of less than 7%. Almost no improvement since last visit, likely because he only takes Lantus when his glucose is over 300.   Will have him continue Mounjaro and Lantus at current dosing and add SGLT2i. He's very inconsistent with insulin administration and I'd really like to see if we can manage without it.

## 2025-02-12 NOTE — PROGRESS NOTES
PROGRESS NOTE    SUBJECTIVE:   Pete Hamm is a 69 y.o. male seen for a follow up visit regarding the following chief complaint:     Chief Complaint   Patient presents with    Swelling     Was seen 1/20 for left hand swelling. Hand still painful. Swelling has gone down. Took colchicine 3 a day for a week.            HPI patient presents office for follow-up of his left wrist pain after taking colchicine and states the swelling went down he has improved but is not back to normal      Past Medical History, Past Surgical History, Family history, Social History, and Medications were all reviewed with the patient today and updated as necessary.       Current Outpatient Medications   Medication Sig Dispense Refill    LANTUS SOLOSTAR 100 UNIT/ML injection pen Inject 50 Units into the skin nightly Adjust dose by 2 units every 3 days to keep fasting glucose between  mg/dL. Max daily dose: 80 units 72 mL 3    JARDIANCE 10 MG tablet Take 1 tablet by mouth daily 90 tablet 3    levothyroxine (SYNTHROID) 200 MCG tablet Take 1 tablet by mouth every morning (before breakfast) 90 tablet 3    MOUNJARO 5 MG/0.5ML SOAJ Inject 5 mg into the skin once a week 6 mL 3    colchicine (COLCRYS) 0.6 MG tablet Two p.o. at onset of gout attack then one p.o. every hour to a maximum of five or N/V/diarrhea or pain subsides 30 tablet 1    Continuous Glucose Sensor (DEXCOM G7 SENSOR) MISC Use to monitor glucose, change q 10 days E11.65 9 each 3    tamsulosin (FLOMAX) 0.4 MG capsule TAKE 1 CAPSULE DAILY 90 capsule 0    losartan (COZAAR) 25 MG tablet Take 1 tablet by mouth daily TAKE 1 TABLET DAILY 90 tablet 3    HUMALOG KWIKPEN 100 UNIT/ML SOPN Inject 6 Units into the skin 3 times daily (before meals) Add correction scale 3/50>150. Max daily dose: 100 units 30 mL 3    Insulin Pen Needle 32G X 6 MM MISC For use with insulin 5 times daily. 500 each 3    pravastatin (PRAVACHOL) 80 MG tablet Take 1 tablet by mouth daily 90 tablet 3    sildenafil

## 2025-02-12 NOTE — ASSESSMENT & PLAN NOTE
Patient is over treated on 250 mcg of generic levothyroxine. Decrease dose to 200 mcg Repeat thyroid function tests before next visit.

## 2025-02-13 LAB
ANA SER QL: NEGATIVE
CCP IGA+IGG SERPL IA-ACNC: 5 UNITS (ref 0–19)
RHEUMATOID FACT SER QL LA: NEGATIVE

## 2025-02-18 ENCOUNTER — OFFICE VISIT (OUTPATIENT)
Dept: FAMILY MEDICINE CLINIC | Facility: CLINIC | Age: 70
End: 2025-02-18

## 2025-02-18 VITALS
DIASTOLIC BLOOD PRESSURE: 68 MMHG | HEART RATE: 96 BPM | SYSTOLIC BLOOD PRESSURE: 106 MMHG | WEIGHT: 263 LBS | HEIGHT: 69 IN | BODY MASS INDEX: 38.95 KG/M2

## 2025-02-18 DIAGNOSIS — M25.532 LEFT WRIST PAIN: ICD-10-CM

## 2025-02-18 DIAGNOSIS — E78.00 TYPE 2 DIABETES MELLITUS WITH HYPERCHOLESTEROLEMIA (HCC): Chronic | ICD-10-CM

## 2025-02-18 DIAGNOSIS — Z00.00 MEDICARE ANNUAL WELLNESS VISIT, SUBSEQUENT: Primary | ICD-10-CM

## 2025-02-18 DIAGNOSIS — Z00.00 ROUTINE GENERAL MEDICAL EXAMINATION AT A HEALTH CARE FACILITY: ICD-10-CM

## 2025-02-18 DIAGNOSIS — N52.9 ERECTILE DYSFUNCTION, UNSPECIFIED ERECTILE DYSFUNCTION TYPE: ICD-10-CM

## 2025-02-18 DIAGNOSIS — N18.31 CKD STAGE G3A/A2, GFR 45-59 AND ALBUMIN CREATININE RATIO 30-299 MG/G (HCC): ICD-10-CM

## 2025-02-18 DIAGNOSIS — Z12.5 SPECIAL SCREENING FOR MALIGNANT NEOPLASM OF PROSTATE: ICD-10-CM

## 2025-02-18 DIAGNOSIS — E78.2 MIXED HYPERLIPIDEMIA: ICD-10-CM

## 2025-02-18 DIAGNOSIS — E11.59 HYPERTENSION ASSOCIATED WITH TYPE 2 DIABETES MELLITUS (HCC): Chronic | ICD-10-CM

## 2025-02-18 DIAGNOSIS — E11.69 TYPE 2 DIABETES MELLITUS WITH HYPERCHOLESTEROLEMIA (HCC): Chronic | ICD-10-CM

## 2025-02-18 DIAGNOSIS — M1A.4320 OTHER SECONDARY CHRONIC GOUT OF LEFT WRIST WITHOUT TOPHUS: ICD-10-CM

## 2025-02-18 DIAGNOSIS — Z12.11 SPECIAL SCREENING FOR MALIGNANT NEOPLASMS, COLON: ICD-10-CM

## 2025-02-18 DIAGNOSIS — N40.0 BENIGN PROSTATIC HYPERPLASIA WITHOUT LOWER URINARY TRACT SYMPTOMS: ICD-10-CM

## 2025-02-18 DIAGNOSIS — I15.2 HYPERTENSION ASSOCIATED WITH TYPE 2 DIABETES MELLITUS (HCC): Chronic | ICD-10-CM

## 2025-02-18 DIAGNOSIS — Z13.31 SCREENING FOR DEPRESSION: ICD-10-CM

## 2025-02-18 DIAGNOSIS — I10 ESSENTIAL HYPERTENSION: ICD-10-CM

## 2025-02-18 LAB
GRANS ABSOLUTE, POC: 7.3 K/UL
GRANULOCYTES %, POC: 46.2 %
HEMATOCRIT, POC: ABNORMAL %
HEMOGLOBIN, POC: ABNORMAL G/DL
LYMPHOCYTE %, POC: 17.6 %
LYMPHS ABSOLUTE, POC: 1.7 K/UL
MCH, POC: ABNORMAL PG (ref 20–?)
MCHC, POC: 31.4
MCV, POC: ABNORMAL
MONOCYTE %, POC: 6.2 %
MONOCYTE, ABSOLUTE POC: 0.6 K/UL
MPV, POC: 9.1 FL
PLATELET COUNT, POC: 310 K/UL
PSA SERPL-MCNC: 4.6 NG/ML (ref 0–4)
RBC, POC: 5.84 M/UL
RDW, POC: ABNORMAL %
WBC, POC: 9.6 K/UL

## 2025-02-18 RX ORDER — TAMSULOSIN HYDROCHLORIDE 0.4 MG/1
0.4 CAPSULE ORAL DAILY
Qty: 90 CAPSULE | Refills: 3 | Status: SHIPPED | OUTPATIENT
Start: 2025-02-18 | End: 2025-02-18 | Stop reason: SDUPTHER

## 2025-02-18 RX ORDER — INDOMETHACIN 50 MG/1
50 CAPSULE ORAL 3 TIMES DAILY
Qty: 60 CAPSULE | Refills: 3 | Status: SHIPPED | OUTPATIENT
Start: 2025-02-18

## 2025-02-18 RX ORDER — TAMSULOSIN HYDROCHLORIDE 0.4 MG/1
0.4 CAPSULE ORAL DAILY
Qty: 90 CAPSULE | Refills: 3 | Status: SHIPPED | OUTPATIENT
Start: 2025-02-18

## 2025-02-18 RX ORDER — HYDROCHLOROTHIAZIDE 25 MG/1
25 TABLET ORAL DAILY
Qty: 90 TABLET | Refills: 3 | Status: SHIPPED | OUTPATIENT
Start: 2025-02-18

## 2025-02-18 RX ORDER — SILDENAFIL 100 MG/1
100 TABLET, FILM COATED ORAL PRN
Qty: 30 TABLET | Refills: 11 | Status: SHIPPED | OUTPATIENT
Start: 2025-02-18 | End: 2025-02-18 | Stop reason: SDUPTHER

## 2025-02-18 RX ORDER — HYDROCHLOROTHIAZIDE 25 MG/1
25 TABLET ORAL DAILY
Qty: 90 TABLET | Refills: 3 | Status: SHIPPED | OUTPATIENT
Start: 2025-02-18 | End: 2025-02-18 | Stop reason: SDUPTHER

## 2025-02-18 RX ORDER — SILDENAFIL 100 MG/1
100 TABLET, FILM COATED ORAL PRN
Qty: 30 TABLET | Refills: 11 | Status: SHIPPED | OUTPATIENT
Start: 2025-02-18

## 2025-02-18 RX ORDER — COLCHICINE 0.6 MG/1
TABLET ORAL
Qty: 30 TABLET | Refills: 1 | Status: SHIPPED | OUTPATIENT
Start: 2025-02-18

## 2025-02-18 RX ORDER — INDOMETHACIN 50 MG/1
50 CAPSULE ORAL 3 TIMES DAILY
Qty: 60 CAPSULE | Refills: 3 | Status: SHIPPED | OUTPATIENT
Start: 2025-02-18 | End: 2025-02-18 | Stop reason: SDUPTHER

## 2025-02-18 RX ORDER — COLCHICINE 0.6 MG/1
TABLET ORAL
Qty: 30 TABLET | Refills: 1 | Status: SHIPPED | OUTPATIENT
Start: 2025-02-18 | End: 2025-02-18 | Stop reason: SDUPTHER

## 2025-02-18 ASSESSMENT — PATIENT HEALTH QUESTIONNAIRE - PHQ9
SUM OF ALL RESPONSES TO PHQ QUESTIONS 1-9: 0
1. LITTLE INTEREST OR PLEASURE IN DOING THINGS: NOT AT ALL
2. FEELING DOWN, DEPRESSED OR HOPELESS: NOT AT ALL
SUM OF ALL RESPONSES TO PHQ QUESTIONS 1-9: 0
SUM OF ALL RESPONSES TO PHQ9 QUESTIONS 1 & 2: 0

## 2025-02-18 ASSESSMENT — ENCOUNTER SYMPTOMS
VOMITING: 0
ABDOMINAL PAIN: 0
NAUSEA: 0
CONSTIPATION: 0
DIARRHEA: 0
SORE THROAT: 0
SHORTNESS OF BREATH: 0
COUGH: 0
WHEEZING: 0

## 2025-02-18 ASSESSMENT — LIFESTYLE VARIABLES
HOW OFTEN DO YOU HAVE A DRINK CONTAINING ALCOHOL: MONTHLY OR LESS
HOW MANY STANDARD DRINKS CONTAINING ALCOHOL DO YOU HAVE ON A TYPICAL DAY: 1 OR 2

## 2025-02-18 NOTE — PROGRESS NOTES
PROGRESS NOTE    SUBJECTIVE:   Pete Hamm is a 69 y.o. male seen for a follow up visit regarding the following chief complaint:     Chief Complaint   Patient presents with    Medicare AWV     Medication refills and pharmacy confirmed.           HPI patient presents office today for complete physical without complaints      Past Medical History, Past Surgical History, Family history, Social History, and Medications were all reviewed with the patient today and updated as necessary.       Current Outpatient Medications   Medication Sig Dispense Refill    colchicine (COLCRYS) 0.6 MG tablet Two p.o. at onset of gout attack then one p.o. every hour to a maximum of five or N/V/diarrhea or pain subsides 30 tablet 1    hydroCHLOROthiazide (HYDRODIURIL) 25 MG tablet Take 1 tablet by mouth daily 90 tablet 3    indomethacin (INDOCIN) 50 MG capsule Take 1 capsule by mouth 3 times daily 60 capsule 3    tamsulosin (FLOMAX) 0.4 MG capsule Take 1 capsule by mouth daily 90 capsule 3    sildenafil (VIAGRA) 100 MG tablet Take 1 tablet by mouth as needed for Erectile Dysfunction (1/2 to 1 tablet as needed.  Max 100mg/ daily) 30 tablet 11    LANTUS SOLOSTAR 100 UNIT/ML injection pen Inject 50 Units into the skin nightly Adjust dose by 2 units every 3 days to keep fasting glucose between  mg/dL. Max daily dose: 80 units 72 mL 3    JARDIANCE 10 MG tablet Take 1 tablet by mouth daily 90 tablet 3    levothyroxine (SYNTHROID) 200 MCG tablet Take 1 tablet by mouth every morning (before breakfast) 90 tablet 3    MOUNJARO 5 MG/0.5ML SOAJ Inject 5 mg into the skin once a week 6 mL 3    Continuous Glucose Sensor (DEXCOM G7 SENSOR) MISC Use to monitor glucose, change q 10 days E11.65 9 each 3    Insulin Pen Needle 32G X 6 MM MISC For use with insulin 5 times daily. 500 each 3    pravastatin (PRAVACHOL) 80 MG tablet Take 1 tablet by mouth daily 90 tablet 3    blood glucose test strips (ASCENSIA AUTODISC VI;ONE TOUCH ULTRA TEST VI) strip 1

## 2025-02-18 NOTE — PATIENT INSTRUCTIONS
different colors is important, such as , electronics, or the .  How are vision tests done?  Visual acuity test   You cover one eye at a time.  You read aloud from a wall chart across the room.  You read aloud from a small card that you hold in your hand.  Refraction   You look into a special device.  The device puts lenses of different strengths in front of each eye to see how strong your glasses or contact lenses need to be.  Visual field tests   Your doctor may have you look through special machines.  Or your doctor may simply have you stare straight ahead while they move a finger into and out of your field of vision.  Color vision test   You look at pieces of printed test patterns in various colors. You say what number or symbol you see.  Your doctor may have you trace the number or symbol using a pointer.  How do these tests feel?  There is very little chance of having a problem from this test. If dilating drops are used for a vision test, they may make the eyes sting and cause a medicine taste in the mouth.  Follow-up care is a key part of your treatment and safety. Be sure to make and go to all appointments, and call your doctor if you are having problems. It's also a good idea to know your test results and keep a list of the medicines you take.  Where can you learn more?  Go to https://[email or web address].net/patientEd and enter G551 to learn more about \"Learning About Vision Tests.\"  Current as of: July 31, 2024  Content Version: 14.3  © 2024 [employer].   Care instructions adapted under license by [employer]. If you have questions about a medical condition or this instruction, always ask your healthcare professional. [employer] disclaims any warranty or liability for your use of this information.         Eating Healthy Foods: Care Instructions  With every meal, you can make healthy food choices. Try to eat a variety of fruits, vegetables, whole grains, lean

## 2025-02-21 ENCOUNTER — TELEPHONE (OUTPATIENT)
Dept: FAMILY MEDICINE CLINIC | Facility: CLINIC | Age: 70
End: 2025-02-21

## 2025-02-21 NOTE — TELEPHONE ENCOUNTER
Called pharmacy for clarification on direction for sildenafil spoke with Arti LLANOS  insurance cover only 6 per month.

## 2025-03-14 ENCOUNTER — TELEPHONE (OUTPATIENT)
Dept: FAMILY MEDICINE CLINIC | Facility: CLINIC | Age: 70
End: 2025-03-14

## 2025-03-14 NOTE — TELEPHONE ENCOUNTER
Called patient regard Lantus Rx no cover under his plan , patient will contact endocrinology regard this medication

## 2025-03-17 ENCOUNTER — TELEPHONE (OUTPATIENT)
Dept: ENDOCRINOLOGY | Age: 70
End: 2025-03-17

## 2025-03-17 DIAGNOSIS — E11.65 TYPE 2 DIABETES MELLITUS WITH HYPERGLYCEMIA, WITH LONG-TERM CURRENT USE OF INSULIN (HCC): ICD-10-CM

## 2025-03-17 DIAGNOSIS — Z79.4 TYPE 2 DIABETES MELLITUS WITH HYPERGLYCEMIA, WITH LONG-TERM CURRENT USE OF INSULIN (HCC): ICD-10-CM

## 2025-03-17 NOTE — TELEPHONE ENCOUNTER
Patient called stating we need to call his insurance, [employer], to get Lantus approved.  Appeal was already started and they have what they need from us.  Per Tao appeal is pending.

## 2025-03-18 RX ORDER — INSULIN GLARGINE 100 [IU]/ML
INJECTION, SOLUTION SUBCUTANEOUS
Refills: 3 | OUTPATIENT
Start: 2025-03-18

## 2025-03-26 DIAGNOSIS — Z79.4 TYPE 2 DIABETES MELLITUS WITH HYPERGLYCEMIA, WITH LONG-TERM CURRENT USE OF INSULIN (HCC): ICD-10-CM

## 2025-03-26 DIAGNOSIS — E11.65 TYPE 2 DIABETES MELLITUS WITH HYPERGLYCEMIA, WITH LONG-TERM CURRENT USE OF INSULIN (HCC): ICD-10-CM

## 2025-03-26 RX ORDER — ACYCLOVIR 400 MG/1
TABLET ORAL
Qty: 9 EACH | Refills: 3 | Status: SHIPPED | OUTPATIENT
Start: 2025-03-26

## 2025-04-17 DIAGNOSIS — E11.65 TYPE 2 DIABETES MELLITUS WITH HYPERGLYCEMIA, WITH LONG-TERM CURRENT USE OF INSULIN (HCC): ICD-10-CM

## 2025-04-17 DIAGNOSIS — Z79.4 TYPE 2 DIABETES MELLITUS WITH HYPERGLYCEMIA, WITH LONG-TERM CURRENT USE OF INSULIN (HCC): ICD-10-CM

## 2025-04-17 RX ORDER — TIRZEPATIDE 5 MG/.5ML
5 INJECTION, SOLUTION SUBCUTANEOUS WEEKLY
Qty: 6 ML | Refills: 3 | Status: SHIPPED | OUTPATIENT
Start: 2025-04-17

## 2025-04-28 DIAGNOSIS — Z79.4 TYPE 2 DIABETES MELLITUS WITH HYPERGLYCEMIA, WITH LONG-TERM CURRENT USE OF INSULIN (HCC): ICD-10-CM

## 2025-04-28 DIAGNOSIS — E11.65 TYPE 2 DIABETES MELLITUS WITH HYPERGLYCEMIA, WITH LONG-TERM CURRENT USE OF INSULIN (HCC): ICD-10-CM

## 2025-04-28 RX ORDER — TIRZEPATIDE 5 MG/.5ML
5 INJECTION, SOLUTION SUBCUTANEOUS WEEKLY
Qty: 6 ML | Refills: 3 | Status: SHIPPED | OUTPATIENT
Start: 2025-04-28

## 2025-06-02 NOTE — PROGRESS NOTES
Skin:  Negative for rash and wound.   Neurological:  Negative for dizziness and light-headedness.   Psychiatric/Behavioral:  Negative for dysphoric mood and sleep disturbance. The patient is not nervous/anxious.        BP (!) 142/80 (BP Site: Left Upper Arm, Patient Position: Sitting, BP Cuff Size: Large Adult)   Pulse 74   Ht 1.753 m (5' 9\")   Wt 117.7 kg (259 lb 6.4 oz)   SpO2 96%   BMI 38.31 kg/m²     Wt Readings from Last 3 Encounters:   06/03/25 117.7 kg (259 lb 6.4 oz)   02/18/25 119.3 kg (263 lb)   02/12/25 121.1 kg (267 lb)     Body weight trend: decreasing steadily    Physical Exam  Constitutional:       Appearance: Normal appearance.   HENT:      Head: Normocephalic.   Neck:      Thyroid: No thyroid mass, thyromegaly or thyroid tenderness.   Cardiovascular:      Rate and Rhythm: Normal rate and regular rhythm.   Pulmonary:      Effort: Pulmonary effort is normal.      Breath sounds: Normal breath sounds.   Abdominal:      General: Abdomen is flat. Bowel sounds are normal.      Palpations: Abdomen is soft.   Lymphadenopathy:      Cervical: No cervical adenopathy.   Skin:     General: Skin is warm and dry.   Neurological:      General: No focal deficit present.      Mental Status: He is alert and oriented to person, place, and time.              KAMI Mullins-CNP, BC-ADM            The patient (or guardian, if applicable) and other individuals in attendance with the patient were advised that Artificial Intelligence will be utilized during this visit to record, process the conversation to generate a clinical note, and support improvement of the AI technology. The patient (or guardian, if applicable) and other individuals in attendance at the appointment consented to the use of AI, including the recording.

## 2025-06-03 ENCOUNTER — TELEPHONE (OUTPATIENT)
Dept: ENDOCRINOLOGY | Age: 70
End: 2025-06-03

## 2025-06-03 ENCOUNTER — OFFICE VISIT (OUTPATIENT)
Dept: ENDOCRINOLOGY | Age: 70
End: 2025-06-03
Payer: COMMERCIAL

## 2025-06-03 VITALS
WEIGHT: 259.4 LBS | DIASTOLIC BLOOD PRESSURE: 80 MMHG | HEART RATE: 74 BPM | SYSTOLIC BLOOD PRESSURE: 142 MMHG | HEIGHT: 69 IN | BODY MASS INDEX: 38.42 KG/M2 | OXYGEN SATURATION: 96 %

## 2025-06-03 DIAGNOSIS — Z79.4 TYPE 2 DIABETES MELLITUS WITH HYPERGLYCEMIA, WITH LONG-TERM CURRENT USE OF INSULIN (HCC): Primary | ICD-10-CM

## 2025-06-03 DIAGNOSIS — E78.00 TYPE 2 DIABETES MELLITUS WITH HYPERCHOLESTEROLEMIA (HCC): ICD-10-CM

## 2025-06-03 DIAGNOSIS — E11.65 TYPE 2 DIABETES MELLITUS WITH HYPERGLYCEMIA, WITH LONG-TERM CURRENT USE OF INSULIN (HCC): Primary | ICD-10-CM

## 2025-06-03 DIAGNOSIS — E11.69 TYPE 2 DIABETES MELLITUS WITH HYPERCHOLESTEROLEMIA (HCC): ICD-10-CM

## 2025-06-03 DIAGNOSIS — R97.20 ELEVATED PSA: ICD-10-CM

## 2025-06-03 LAB — HBA1C MFR BLD: 8.3 %

## 2025-06-03 PROCEDURE — 95251 CONT GLUC MNTR ANALYSIS I&R: CPT | Performed by: NURSE PRACTITIONER

## 2025-06-03 PROCEDURE — G8427 DOCREV CUR MEDS BY ELIG CLIN: HCPCS | Performed by: NURSE PRACTITIONER

## 2025-06-03 PROCEDURE — 3017F COLORECTAL CA SCREEN DOC REV: CPT | Performed by: NURSE PRACTITIONER

## 2025-06-03 PROCEDURE — 1123F ACP DISCUSS/DSCN MKR DOCD: CPT | Performed by: NURSE PRACTITIONER

## 2025-06-03 PROCEDURE — 3046F HEMOGLOBIN A1C LEVEL >9.0%: CPT | Performed by: NURSE PRACTITIONER

## 2025-06-03 PROCEDURE — 3052F HG A1C>EQUAL 8.0%<EQUAL 9.0%: CPT | Performed by: NURSE PRACTITIONER

## 2025-06-03 PROCEDURE — 3079F DIAST BP 80-89 MM HG: CPT | Performed by: NURSE PRACTITIONER

## 2025-06-03 PROCEDURE — G8417 CALC BMI ABV UP PARAM F/U: HCPCS | Performed by: NURSE PRACTITIONER

## 2025-06-03 PROCEDURE — 99214 OFFICE O/P EST MOD 30 MIN: CPT | Performed by: NURSE PRACTITIONER

## 2025-06-03 PROCEDURE — 83036 HEMOGLOBIN GLYCOSYLATED A1C: CPT | Performed by: NURSE PRACTITIONER

## 2025-06-03 PROCEDURE — 3077F SYST BP >= 140 MM HG: CPT | Performed by: NURSE PRACTITIONER

## 2025-06-03 PROCEDURE — 2022F DILAT RTA XM EVC RTNOPTHY: CPT | Performed by: NURSE PRACTITIONER

## 2025-06-03 PROCEDURE — 1036F TOBACCO NON-USER: CPT | Performed by: NURSE PRACTITIONER

## 2025-06-03 RX ORDER — TIRZEPATIDE 7.5 MG/.5ML
7.5 INJECTION, SOLUTION SUBCUTANEOUS
Qty: 6 ML | Refills: 3 | Status: SHIPPED | OUTPATIENT
Start: 2025-06-03

## 2025-06-03 RX ORDER — EMPAGLIFLOZIN 25 MG/1
25 TABLET, FILM COATED ORAL DAILY
Qty: 90 TABLET | Refills: 3 | Status: CANCELLED | OUTPATIENT
Start: 2025-06-03

## 2025-06-03 ASSESSMENT — ENCOUNTER SYMPTOMS
DIARRHEA: 0
CONSTIPATION: 0

## 2025-06-03 NOTE — ASSESSMENT & PLAN NOTE
Type 2 diabetes mellitus.  Blood glucose levels have shown a slight improvement, with an average reading of 229 compared to the previous 236. Hemoglobin A1c has decreased marginally from 8.4 to 8.3. Discussed increasing the dosage of Mounjaro to 7.5 mg and continuing the current regimen of Lantus 51 units daily. Advised to complete the existing supply of Mounjaro 5 mg before transitioning to the new dosage and to undergo an annual diabetic eye examination. If side effects from the increased Mounjaro dosage occur, he should inform the clinic.    Carpal tunnel syndrome.  Reports pain in the wrist extending to the forearm, elbow, shoulder, and upper back, suggestive of carpal tunnel syndrome. Differential diagnosis includes trigger finger. No immediate intervention planned as the pain has subsided. Further evaluation will be considered if symptoms persist or worsen.      Follow-up: The patient will follow up in 09/2025.

## 2025-06-03 NOTE — ASSESSMENT & PLAN NOTE
Elevated prostate-specific antigen.  PSA levels have increased from around 3 in previous years to 4.6 in 2025. Currently taking tamsulosin and saw [facility] for prostate health, but these do not prevent prostate cancer. Referral to urology at [facility] will be made for further evaluation and potential biopsy to rule out prostate cancer.

## 2025-06-03 NOTE — TELEPHONE ENCOUNTER
Pharmacist from [facility] called asking why patient is not on a statin since they are diabetic and if you want to prescribe one?

## 2025-06-04 RX ORDER — PRAVASTATIN SODIUM 80 MG/1
80 TABLET ORAL DAILY
Qty: 90 TABLET | Refills: 3 | Status: SHIPPED | OUTPATIENT
Start: 2025-06-04

## 2025-06-04 NOTE — TELEPHONE ENCOUNTER
Tried to call pharmacist back with information but they were having technically difficulty.  Will try to return call later today.

## 2025-06-04 NOTE — TELEPHONE ENCOUNTER
He's supposed to be. Doesn't appear he's filled it since April 2024. I'll send a new prescription.

## 2025-06-30 ENCOUNTER — OFFICE VISIT (OUTPATIENT)
Dept: UROLOGY | Age: 70
End: 2025-06-30
Payer: COMMERCIAL

## 2025-06-30 DIAGNOSIS — C64.2 MALIGNANT NEOPLASM OF LEFT KIDNEY, EXCEPT RENAL PELVIS (HCC): ICD-10-CM

## 2025-06-30 DIAGNOSIS — R97.20 ELEVATED PSA: Primary | ICD-10-CM

## 2025-06-30 DIAGNOSIS — N13.8 BPH WITH OBSTRUCTION/LOWER URINARY TRACT SYMPTOMS: ICD-10-CM

## 2025-06-30 DIAGNOSIS — N40.1 BPH WITH OBSTRUCTION/LOWER URINARY TRACT SYMPTOMS: ICD-10-CM

## 2025-06-30 LAB
BILIRUBIN, URINE, POC: NEGATIVE
BLOOD URINE, POC: NORMAL
GLUCOSE URINE, POC: NEGATIVE MG/DL
KETONES, URINE, POC: NEGATIVE MG/DL
LEUKOCYTE ESTERASE, URINE, POC: NORMAL
NITRITE, URINE, POC: NEGATIVE
PH, URINE, POC: 5.5 (ref 4.6–8)
PROTEIN,URINE, POC: NEGATIVE MG/DL
PSA FREE MFR SERPL: 34.9 %
PSA FREE SERPL-MCNC: 1.3 NG/ML
PSA SERPL-MCNC: 3.6 NG/ML (ref 0–4)
SPECIFIC GRAVITY, URINE, POC: 1.02 (ref 1–1.03)
URINALYSIS CLARITY, POC: NORMAL
URINALYSIS COLOR, POC: NORMAL
UROBILINOGEN, POC: NORMAL MG/DL

## 2025-06-30 PROCEDURE — 99204 OFFICE O/P NEW MOD 45 MIN: CPT | Performed by: UROLOGY

## 2025-06-30 PROCEDURE — 3017F COLORECTAL CA SCREEN DOC REV: CPT | Performed by: UROLOGY

## 2025-06-30 PROCEDURE — 81003 URINALYSIS AUTO W/O SCOPE: CPT | Performed by: UROLOGY

## 2025-06-30 PROCEDURE — G8417 CALC BMI ABV UP PARAM F/U: HCPCS | Performed by: UROLOGY

## 2025-06-30 PROCEDURE — G8427 DOCREV CUR MEDS BY ELIG CLIN: HCPCS | Performed by: UROLOGY

## 2025-06-30 PROCEDURE — 1123F ACP DISCUSS/DSCN MKR DOCD: CPT | Performed by: UROLOGY

## 2025-06-30 PROCEDURE — 1036F TOBACCO NON-USER: CPT | Performed by: UROLOGY

## 2025-06-30 NOTE — PROGRESS NOTES
[facility]  [address] [place] [id number]  [phone]    Pete Hamm  : 1955    Chief Complaint   Patient presents with    Follow-up    Nephrolithiasis        HPI     Pete Hamm is a 69 y.o. male   With hx of renal cell cancer stage T1a grade 1 s/p left BRYSON nephrectomy 10-1-20.    Was having right flank pain and had abdominal US on 20: IMPRESSION:   1.  No visualized etiology for right flank pain.  2.  Questionable 1.4 cm left renal stone versus prominent renal sinus fat.  3.  Echogenic, coarsened liver compatible steatosis and/or fibrosis.  4.  3.9 x 4.3 cm irregular fluid collection or cystic lesion which is  inseparable and may arise from the pancreas. Recommend characterization with MRI  renal mass protocol.  5.  Mild splenomegaly.     Then had abdominal MRI on 20: Findings:  Lung bases are clear.     The liver is unremarkable in contour without evidence of hypervascular lesion.   There is diffuse signal dropout of the liver parenchyma on out of phase imaging,  consistent with hepatic steatosis.  Normal-appearing intrahepatic and  extrahepatic ducts.  Normal-appearing pancreatic duct.  Visualized hepatic  venous and portal venous branches appear patent.     The gallbladder, pancreas, spleen, adrenal glands, are unremarkable.  Mild  splenomegaly measuring 15.1 cm in length.  Again visualized are small simple  appearing bilateral renal cortical and parapelvic cysts.  Again visualized is a  large cystic lesion which appears associated with the pancreatic neck measuring  approximately 4.1 x 5.5 x 5.4 cm, with internal thin septations and the more  dependent portion appearing to contain internal debris.  No definite significant  enhancing or solid component is seen associated with this cystic lesion.  In  addition there appears to be a heterogeneously hypoenhancing left renal  parapelvic upper pole mass measuring 3.1 x 3.5 cm which is concerning for

## 2025-07-21 ENCOUNTER — TELEPHONE (OUTPATIENT)
Dept: ENDOCRINOLOGY | Age: 70
End: 2025-07-21

## 2025-07-21 DIAGNOSIS — E11.65 TYPE 2 DIABETES MELLITUS WITH HYPERGLYCEMIA, WITH LONG-TERM CURRENT USE OF INSULIN (HCC): ICD-10-CM

## 2025-07-21 DIAGNOSIS — Z79.4 TYPE 2 DIABETES MELLITUS WITH HYPERGLYCEMIA, WITH LONG-TERM CURRENT USE OF INSULIN (HCC): ICD-10-CM

## 2025-07-21 RX ORDER — INSULIN GLARGINE-YFGN 100 [IU]/ML
50 INJECTION, SOLUTION SUBCUTANEOUS NIGHTLY
Qty: 63 ML | Refills: 3 | Status: SHIPPED | OUTPATIENT
Start: 2025-07-21

## 2025-07-21 NOTE — TELEPHONE ENCOUNTER
Patient needs refill on Lantus, however insurance will not cover that anymore.      They will cover the following and lantus is on the list but they will not cover.         He would like it sent to [facility].

## 2025-07-23 ENCOUNTER — TELEPHONE (OUTPATIENT)
Dept: ENDOCRINOLOGY | Age: 70
End: 2025-07-23

## 2025-07-23 NOTE — TELEPHONE ENCOUNTER
Insurance will only cover Basaglar kwik pen or tresiba vials or pens.  Can this be sent to [facility] mail [facility].

## 2025-07-28 ENCOUNTER — OFFICE VISIT (OUTPATIENT)
Dept: FAMILY MEDICINE CLINIC | Facility: CLINIC | Age: 70
End: 2025-07-28
Payer: COMMERCIAL

## 2025-07-28 VITALS
HEART RATE: 78 BPM | SYSTOLIC BLOOD PRESSURE: 116 MMHG | WEIGHT: 259 LBS | HEIGHT: 69 IN | DIASTOLIC BLOOD PRESSURE: 74 MMHG | BODY MASS INDEX: 38.36 KG/M2

## 2025-07-28 DIAGNOSIS — E11.65 TYPE 2 DIABETES MELLITUS WITH HYPERGLYCEMIA, WITH LONG-TERM CURRENT USE OF INSULIN (HCC): ICD-10-CM

## 2025-07-28 DIAGNOSIS — Z79.4 TYPE 2 DIABETES MELLITUS WITH HYPERGLYCEMIA, WITH LONG-TERM CURRENT USE OF INSULIN (HCC): ICD-10-CM

## 2025-07-28 DIAGNOSIS — M25.561 CHRONIC PAIN OF RIGHT KNEE: Primary | ICD-10-CM

## 2025-07-28 DIAGNOSIS — G89.29 CHRONIC PAIN OF RIGHT KNEE: Primary | ICD-10-CM

## 2025-07-28 PROCEDURE — 99213 OFFICE O/P EST LOW 20 MIN: CPT | Performed by: FAMILY MEDICINE

## 2025-07-28 PROCEDURE — G8427 DOCREV CUR MEDS BY ELIG CLIN: HCPCS | Performed by: FAMILY MEDICINE

## 2025-07-28 PROCEDURE — 3074F SYST BP LT 130 MM HG: CPT | Performed by: FAMILY MEDICINE

## 2025-07-28 PROCEDURE — 1036F TOBACCO NON-USER: CPT | Performed by: FAMILY MEDICINE

## 2025-07-28 PROCEDURE — G8417 CALC BMI ABV UP PARAM F/U: HCPCS | Performed by: FAMILY MEDICINE

## 2025-07-28 PROCEDURE — 3078F DIAST BP <80 MM HG: CPT | Performed by: FAMILY MEDICINE

## 2025-07-28 PROCEDURE — 1123F ACP DISCUSS/DSCN MKR DOCD: CPT | Performed by: FAMILY MEDICINE

## 2025-07-28 PROCEDURE — 3017F COLORECTAL CA SCREEN DOC REV: CPT | Performed by: FAMILY MEDICINE

## 2025-07-28 RX ORDER — TIRZEPATIDE 7.5 MG/.5ML
7.5 INJECTION, SOLUTION SUBCUTANEOUS
Qty: 6 ML | Refills: 3 | Status: SHIPPED | OUTPATIENT
Start: 2025-07-28

## 2025-07-28 RX ORDER — MELOXICAM 15 MG/1
15 TABLET ORAL DAILY
Qty: 90 TABLET | Refills: 1 | Status: SHIPPED | OUTPATIENT
Start: 2025-07-28

## 2025-07-28 ASSESSMENT — ENCOUNTER SYMPTOMS
NAUSEA: 0
SHORTNESS OF BREATH: 0
VOMITING: 0

## 2025-07-28 NOTE — PROGRESS NOTES
SEPTOPLASTY      UPPP for LIO    THYROIDECTOMY      thyroid cancer    UPPER GASTROINTESTINAL ENDOSCOPY       Family History   Problem Relation Age of Onset    Cancer Mother         lung cancer w/ mets    Other Father         gallbladder disease, flap in esophagus, wears glasses    No Known Problems Brother     No Known Problems Sister     No Known Problems Sister     Stroke Brother         cerebral aneurysm     Social History     Tobacco Use    Smoking status: Former     Current packs/day: 0.00     Average packs/day: 0.5 packs/day for 20.0 years (10.0 ttl pk-yrs)     Types: Cigarettes     Start date: 1978     Quit date: 1998     Years since quittin.5     Passive exposure: Past    Smokeless tobacco: Never   Substance Use Topics    Alcohol use: Yes     Alcohol/week: 4.0 - 7.0 standard drinks of alcohol         Review of Systems   Constitutional:  Negative for fever.   Respiratory:  Negative for shortness of breath.    Cardiovascular:  Negative for chest pain.   Gastrointestinal:  Negative for nausea and vomiting.   Musculoskeletal:         Right knee pain         OBJECTIVE:  /74 (BP Site: Right Upper Arm, Patient Position: Sitting, BP Cuff Size: Large Adult)   Pulse 78   Ht 1.753 m (5' 9\")   Wt 117.5 kg (259 lb)   BMI 38.25 kg/m²      Physical Exam  Musculoskeletal:      Right knee: Bony tenderness and crepitus present. Decreased range of motion. Tenderness present over the MCL. Abnormal meniscus.          Medical problems and test results were reviewed with the patient today.     Recent Results (from the past 4 weeks)   PSA, Total and Free    Collection Time: 25  4:14 PM   Result Value Ref Range    PSA 3.6 0.0 - 4.0 ng/mL    PSA, Free 1.3 ng/mL    PSA, Free Pct 34.9 %       ASSESSMENT and PLAN    Visit Diagnoses and Associated Orders         Chronic pain of right knee    -  Primary    MRI KNEE RIGHT WO CONTRAST [70434 CPT(R)]   - Future Order    meloxicam (MOBIC) 15 MG tablet [[id number]]

## 2025-08-14 ENCOUNTER — TELEMEDICINE (OUTPATIENT)
Dept: FAMILY MEDICINE CLINIC | Facility: CLINIC | Age: 70
End: 2025-08-14
Payer: COMMERCIAL

## 2025-08-14 DIAGNOSIS — S83.231D COMPLEX TEAR OF MEDIAL MENISCUS OF RIGHT KNEE AS CURRENT INJURY, SUBSEQUENT ENCOUNTER: Primary | ICD-10-CM

## 2025-08-14 PROCEDURE — 99213 OFFICE O/P EST LOW 20 MIN: CPT | Performed by: FAMILY MEDICINE

## 2025-08-15 ENCOUNTER — OFFICE VISIT (OUTPATIENT)
Dept: ORTHOPEDIC SURGERY | Age: 70
End: 2025-08-15
Payer: COMMERCIAL

## 2025-08-15 DIAGNOSIS — M25.461 EFFUSION OF RIGHT KNEE: ICD-10-CM

## 2025-08-15 DIAGNOSIS — M17.11 PRIMARY OSTEOARTHRITIS OF RIGHT KNEE: ICD-10-CM

## 2025-08-15 DIAGNOSIS — M25.561 RIGHT KNEE PAIN, UNSPECIFIED CHRONICITY: Primary | ICD-10-CM

## 2025-08-15 DIAGNOSIS — S83.231A COMPLEX TEAR OF MEDIAL MENISCUS OF RIGHT KNEE AS CURRENT INJURY, INITIAL ENCOUNTER: ICD-10-CM

## 2025-08-15 DIAGNOSIS — M71.21 BAKER'S CYST OF KNEE, RIGHT: ICD-10-CM

## 2025-08-15 PROCEDURE — 99204 OFFICE O/P NEW MOD 45 MIN: CPT | Performed by: PHYSICIAN ASSISTANT

## 2025-08-15 PROCEDURE — 1036F TOBACCO NON-USER: CPT | Performed by: PHYSICIAN ASSISTANT

## 2025-08-15 PROCEDURE — G8427 DOCREV CUR MEDS BY ELIG CLIN: HCPCS | Performed by: PHYSICIAN ASSISTANT

## 2025-08-15 PROCEDURE — G8417 CALC BMI ABV UP PARAM F/U: HCPCS | Performed by: PHYSICIAN ASSISTANT

## 2025-08-15 PROCEDURE — 1123F ACP DISCUSS/DSCN MKR DOCD: CPT | Performed by: PHYSICIAN ASSISTANT

## 2025-08-15 PROCEDURE — 3017F COLORECTAL CA SCREEN DOC REV: CPT | Performed by: PHYSICIAN ASSISTANT

## 2025-08-19 ENCOUNTER — TELEPHONE (OUTPATIENT)
Dept: ORTHOPEDIC SURGERY | Age: 70
End: 2025-08-19

## 2025-09-04 ENCOUNTER — OFFICE VISIT (OUTPATIENT)
Dept: ENDOCRINOLOGY | Age: 70
End: 2025-09-04
Payer: COMMERCIAL

## 2025-09-04 VITALS
WEIGHT: 257 LBS | DIASTOLIC BLOOD PRESSURE: 80 MMHG | OXYGEN SATURATION: 99 % | BODY MASS INDEX: 38.06 KG/M2 | SYSTOLIC BLOOD PRESSURE: 136 MMHG | HEART RATE: 78 BPM | HEIGHT: 69 IN

## 2025-09-04 DIAGNOSIS — E11.65 TYPE 2 DIABETES MELLITUS WITH HYPERGLYCEMIA, WITH LONG-TERM CURRENT USE OF INSULIN (HCC): Primary | ICD-10-CM

## 2025-09-04 DIAGNOSIS — Z79.4 TYPE 2 DIABETES MELLITUS WITH HYPERGLYCEMIA, WITH LONG-TERM CURRENT USE OF INSULIN (HCC): Primary | ICD-10-CM

## 2025-09-04 LAB — HBA1C MFR BLD: 6.8 %

## 2025-09-04 PROCEDURE — 99214 OFFICE O/P EST MOD 30 MIN: CPT | Performed by: NURSE PRACTITIONER

## 2025-09-04 PROCEDURE — 3075F SYST BP GE 130 - 139MM HG: CPT | Performed by: NURSE PRACTITIONER

## 2025-09-04 PROCEDURE — 3044F HG A1C LEVEL LT 7.0%: CPT | Performed by: NURSE PRACTITIONER

## 2025-09-04 PROCEDURE — G8427 DOCREV CUR MEDS BY ELIG CLIN: HCPCS | Performed by: NURSE PRACTITIONER

## 2025-09-04 PROCEDURE — 3017F COLORECTAL CA SCREEN DOC REV: CPT | Performed by: NURSE PRACTITIONER

## 2025-09-04 PROCEDURE — 95251 CONT GLUC MNTR ANALYSIS I&R: CPT | Performed by: NURSE PRACTITIONER

## 2025-09-04 PROCEDURE — 83036 HEMOGLOBIN GLYCOSYLATED A1C: CPT | Performed by: NURSE PRACTITIONER

## 2025-09-04 PROCEDURE — 1123F ACP DISCUSS/DSCN MKR DOCD: CPT | Performed by: NURSE PRACTITIONER

## 2025-09-04 PROCEDURE — 3079F DIAST BP 80-89 MM HG: CPT | Performed by: NURSE PRACTITIONER

## 2025-09-04 PROCEDURE — 3046F HEMOGLOBIN A1C LEVEL >9.0%: CPT | Performed by: NURSE PRACTITIONER

## 2025-09-04 PROCEDURE — 1036F TOBACCO NON-USER: CPT | Performed by: NURSE PRACTITIONER

## 2025-09-04 PROCEDURE — 2022F DILAT RTA XM EVC RTNOPTHY: CPT | Performed by: NURSE PRACTITIONER

## 2025-09-04 PROCEDURE — G8417 CALC BMI ABV UP PARAM F/U: HCPCS | Performed by: NURSE PRACTITIONER

## 2025-09-04 ASSESSMENT — ENCOUNTER SYMPTOMS
CONSTIPATION: 0
DIARRHEA: 0

## (undated) DEVICE — SPONGE LAP 18X18IN STRL -- 5/PK

## (undated) DEVICE — BLOCK BITE AD 60FR W/ VELC STRP ADDRESSES MOST PT AND

## (undated) DEVICE — AMPLATZ TYPE GRADUATED RENAL DILATATION SET

## (undated) DEVICE — Device

## (undated) DEVICE — CYSTO: Brand: MEDLINE INDUSTRIES, INC.

## (undated) DEVICE — SOLUTION IV 1000ML 0.9% SOD CHL

## (undated) DEVICE — KENDALL RADIOLUCENT FOAM MONITORING ELECTRODE RECTANGULAR SHAPE: Brand: KENDALL

## (undated) DEVICE — GENERAL LAPAROSCOPY: Brand: MEDLINE INDUSTRIES, INC.

## (undated) DEVICE — URETERAL ACCESS SHEATH SET: Brand: NAVIGATOR HD

## (undated) DEVICE — GARMENT,MEDLINE,DVT,INT,CALF,MED, GEN2: Brand: MEDLINE

## (undated) DEVICE — SUTURE VCRL SZ 0 L54IN ABSRB UD POLYGLACTIN 910 COAT BRAID J608H

## (undated) DEVICE — CYSTO/BLADDER IRRIGATION SET, REGULATING CLAMP

## (undated) DEVICE — HOOKED-PRONG GRASPING FORCEPS: Brand: TRICEP

## (undated) DEVICE — TROCAR RMFG THRD KII 15X100MM --

## (undated) DEVICE — WARMER SCP STRL DISP

## (undated) DEVICE — PACK SURGICAL PROCEDURE KIT CYSTOSCOPY TOTE

## (undated) DEVICE — CATH URETH FOL 2W SH 20FRX5ML --

## (undated) DEVICE — SHEARS ENDOSCP L36CM DIA5MM ULTRASONIC CRV TIP W/ ADV

## (undated) DEVICE — GUIDEWIRE ENDOSCP L150CM DIA0.038IN TIP L3CM PTFE FLX STR

## (undated) DEVICE — SKIN PREP TRAY 4 COMPARTM TRAY: Brand: MEDLINE INDUSTRIES, INC.

## (undated) DEVICE — GOWN,REINFORCED,POLY,AURORA,XXLARGE,STR: Brand: MEDLINE

## (undated) DEVICE — SOLUTION IRRIG 3000ML H2O STRL BAG

## (undated) DEVICE — APPLIER CLP M L L11.4IN DIA10MM ENDOSCP ROT MULT FOR LIG

## (undated) DEVICE — INTENDED FOR TISSUE SEPARATION, AND OTHER PROCEDURES THAT REQUIRE A SHARP SURGICAL BLADE TO PUNCTURE OR CUT.: Brand: BARD-PARKER SAFETY BLADES SIZE 10, STERILE

## (undated) DEVICE — CANNULA NSL ORAL AD FOR CAPNOFLEX CO2 O2 AIRLFE

## (undated) DEVICE — 2, DISPOSABLE SUCTION/IRRIGATOR WITHOUT DISPOSABLE TIP: Brand: STRYKEFLOW

## (undated) DEVICE — MARYLAND JAW LAPAROSCOPIC SEALER/DIVIDER COATED: Brand: LIGASURE

## (undated) DEVICE — CONNECTOR TBNG OD5-7MM O2 END DISP

## (undated) DEVICE — DRAPE,UNDERBUTTOCKS,PCH,STERILE: Brand: MEDLINE

## (undated) DEVICE — DEVICE SECUREMENT AD W/ TRICOT ANCHR PD FOR F LTX SIL CATH

## (undated) DEVICE — CONTAINER PREFIL FRMLN 40ML --

## (undated) DEVICE — REM POLYHESIVE ADULT PATIENT RETURN ELECTRODE: Brand: VALLEYLAB

## (undated) DEVICE — BLADE ASSEMB CLP HAIR FINE --

## (undated) DEVICE — RELOAD STPL L45MM H1-2.6MM MESENTERY THN TISS WHT GRIPPING

## (undated) DEVICE — WATER 50W MAX / AIR 8W MAX: Brand: FLEXIVA TRACTIP

## (undated) DEVICE — NITINOL WIRE WITH HYDROPHILIC TIP: Brand: SENSOR

## (undated) DEVICE — PAD,ARMBOARD,CONV,FOAM,2X8X20",12PR/CS: Brand: MEDLINE

## (undated) DEVICE — TROCAR: Brand: KII FIOS FIRST ENTRY

## (undated) DEVICE — GARMENT,MEDLINE,DVT,INT,CALF,LG, GEN2: Brand: MEDLINE

## (undated) DEVICE — BLUNT DISSECTOR: Brand: ENDO PEANUT

## (undated) DEVICE — ENDOSCOPIC ULTRASOUND FINE NEEDLE BIOPSY (FNB) DEVICE: Brand: ACQUIRE

## (undated) DEVICE — FORCEPS BX L240CM JAW DIA2.8MM L CAP W/ NDL MIC MESH TOOTH

## (undated) DEVICE — CATH URET 5FRX70CM W/OPN END -- BX/20

## (undated) DEVICE — AGENT HEMSTAT W2XL14IN OXIDIZED REGENERATED CELOS ABSRB FOR

## (undated) DEVICE — STAPLER INT L340MM 45MM STD 12 FIRING B FRM PWR + GRIPPING

## (undated) DEVICE — KIT,ANTI FOG,W/SPONGE & FLUID,SOFT PACK: Brand: MEDLINE

## (undated) DEVICE — 2000CC GUARDIAN II: Brand: GUARDIAN

## (undated) DEVICE — TUBING INSUFFLATION SMK EVAC HI FLO SET PNEUMOCLEAR

## (undated) DEVICE — SUTURE VCRL SZ 0 L36IN ABSRB UD L36MM CT-1 1/2 CIR J946H

## (undated) DEVICE — AIRLIFE™ OXYGEN TUBING 7 FEET (2.1 M) CRUSH RESISTANT OXYGEN TUBING, VINYL TIPPED: Brand: AIRLIFE™

## (undated) DEVICE — POUCH SPEC RETRV SHFT 15MM 13X23CM GRN POLYUR DBL RNG HNDL

## (undated) DEVICE — BLLN KT O RING ENDOSCP US --

## (undated) DEVICE — SUTURE PDS II SZ 1 L96IN ABSRB VLT TP-1 L65MM 1/2 CIR Z880G

## (undated) DEVICE — TRAY PREP DRY W/ PREM GLV 2 APPL 6 SPNG 2 UNDPD 1 OVERWRAP

## (undated) DEVICE — LOGICUT SCISSOR LENGTH 320MM: Brand: LOGI - LAPAROSCOPIC INSTRUMENT SYSTEM

## (undated) DEVICE — MOUTHPIECE ENDOSCP 20X27MM --

## (undated) DEVICE — STANDARD HYPODERMIC NEEDLE,POLYPROPYLENE HUB: Brand: MONOJECT

## (undated) DEVICE — TUBING, SUCTION, 1/4" X 10', STRAIGHT: Brand: MEDLINE

## (undated) DEVICE — ENDOSCOPIC VALVE WITH ADAPTER.: Brand: SURSEAL® II

## (undated) DEVICE — UTILITY MARKER,BLACK WITH LABELS: Brand: DEVON

## (undated) DEVICE — BUTTON SWITCH PENCIL BLADE ELECTRODE, HOLSTER: Brand: EDGE

## (undated) DEVICE — BLADE SCALP 15 C STL STRL --

## (undated) DEVICE — DRAPE TWL SURG 16X26IN BLU ORB04] ALLCARE INC]

## (undated) DEVICE — ACCESS PLATFORM FOR MINIMALLY INVASIVE SURGERY.: Brand: GELPORT® LAPAROSCOPIC  SYSTEM

## (undated) DEVICE — FOLEY TRAY DRAINAGE BG 16 FR ANTI REFLX CHMBR COMPLETE CARE

## (undated) DEVICE — 3M™ IOBAN™ 2 ANTIMICROBIAL INCISE DRAPE 6650EZ: Brand: IOBAN™ 2

## (undated) DEVICE — CATHETER URET 5FR L70CM TIP 8FR OPN END CONE TIP INJ HUB

## (undated) DEVICE — SINGLE-USE DIGITAL FLEXIBLE URETEROSCOPE: Brand: LITHOVUE

## (undated) DEVICE — (D)PREP SKN CHLRAPRP APPL 26ML -- CONVERT TO ITEM 371833

## (undated) DEVICE — APPLICATOR FBR TIP L6IN COT TIP WOOD SHFT SWAB 2000 PER CA